# Patient Record
Sex: FEMALE | Race: WHITE | NOT HISPANIC OR LATINO | Employment: UNEMPLOYED | URBAN - METROPOLITAN AREA
[De-identification: names, ages, dates, MRNs, and addresses within clinical notes are randomized per-mention and may not be internally consistent; named-entity substitution may affect disease eponyms.]

---

## 2019-01-14 ENCOUNTER — HOSPITAL ENCOUNTER (OUTPATIENT)
Dept: OTHER | Facility: HOSPITAL | Age: 19
Discharge: HOME | End: 2019-01-14

## 2019-01-14 LAB
ALBUMIN SERPL-MCNC: 4.2 G/DL (ref 3.5–5)
ALBUMIN/GLOB SERPL: 1.1 {RATIO} (ref 1.4–2.6)
ALP SERPL-CCNC: 87 U/L (ref 50–130)
ALT SERPL-CCNC: 15 U/L (ref 10–40)
ANION GAP SERPL CALC-SCNC: 16 MMOL/L (ref 8–19)
AST SERPL-CCNC: 13 U/L (ref 15–50)
BASOPHILS # BLD AUTO: 0.07 10*3/UL (ref 0–0.2)
BASOPHILS NFR BLD AUTO: 0.85 % (ref 0–3)
BILIRUB SERPL-MCNC: 0.38 MG/DL (ref 0.2–1.3)
BUN SERPL-MCNC: 11 MG/DL (ref 5–25)
BUN/CREAT SERPL: 16 {RATIO} (ref 6–20)
CALCIUM SERPL-MCNC: 9.2 MG/DL (ref 8.7–10.4)
CHLORIDE SERPL-SCNC: 102 MMOL/L (ref 99–111)
CONV CO2: 26 MMOL/L (ref 22–32)
CONV TOTAL PROTEIN: 7.9 G/DL (ref 6.3–8.2)
CREAT UR-MCNC: 0.67 MG/DL (ref 0.5–0.9)
EOSINOPHIL # BLD AUTO: 0.3 10*3/UL (ref 0–0.7)
EOSINOPHIL # BLD AUTO: 3.81 % (ref 0–7)
ERYTHROCYTE [DISTWIDTH] IN BLOOD BY AUTOMATED COUNT: 14 % (ref 11.5–14.5)
EST. AVERAGE GLUCOSE BLD GHB EST-MCNC: 82 MG/DL
GFR SERPLBLD BASED ON 1.73 SQ M-ARVRAT: >60 ML/MIN/{1.73_M2}
GLOBULIN UR ELPH-MCNC: 3.7 G/DL (ref 2–3.5)
GLUCOSE SERPL-MCNC: 89 MG/DL (ref 65–99)
HBA1C MFR BLD: 12.3 G/DL (ref 12–16)
HBA1C MFR BLD: 4.5 % (ref 3.5–5.7)
HCT VFR BLD AUTO: 38.4 % (ref 37–47)
LYMPHOCYTES # BLD AUTO: 2.55 10*3/UL (ref 1–5)
MCH RBC QN AUTO: 24.6 PG (ref 27–31)
MCHC RBC AUTO-ENTMCNC: 31.9 G/DL (ref 33–37)
MCV RBC AUTO: 77.1 FL (ref 81–99)
MONOCYTES # BLD AUTO: 0.53 10*3/UL (ref 0.2–1.2)
MONOCYTES NFR BLD AUTO: 6.67 % (ref 3–10)
NEUTROPHILS # BLD AUTO: 4.48 10*3/UL (ref 2–8)
NEUTROPHILS NFR BLD AUTO: 56.5 % (ref 30–85)
NRBC BLD AUTO-RTO: 0 % (ref 0–0.01)
OSMOLALITY SERPL CALC.SUM OF ELEC: 289 MOSM/KG (ref 273–304)
PLATELET # BLD AUTO: 419 10*3/UL (ref 130–400)
PMV BLD AUTO: 6.9 FL (ref 7.4–10.4)
POTASSIUM SERPL-SCNC: 4.3 MMOL/L (ref 3.5–5.3)
RBC # BLD AUTO: 4.98 10*6/UL (ref 4.2–5.4)
SODIUM SERPL-SCNC: 140 MMOL/L (ref 135–147)
T4 FREE SERPL-MCNC: 1.1 NG/DL (ref 0.9–1.8)
TSH SERPL-ACNC: 2.79 M[IU]/L (ref 0.27–4.2)
VARIANT LYMPHS NFR BLD MANUAL: 32.2 % (ref 20–45)
WBC # BLD AUTO: 7.93 10*3/UL (ref 4.8–10.8)

## 2019-01-15 LAB — INSULIN SERPL-ACNC: 34.7 UIU/ML (ref 2.6–24.9)

## 2019-06-03 ENCOUNTER — HOSPITAL ENCOUNTER (OUTPATIENT)
Dept: URGENT CARE | Facility: CLINIC | Age: 19
Discharge: HOME | End: 2019-06-03
Attending: EMERGENCY MEDICINE

## 2019-06-13 ENCOUNTER — HOSPITAL ENCOUNTER (OUTPATIENT)
Dept: CARDIOLOGY | Facility: HOSPITAL | Age: 19
Discharge: HOME | End: 2019-06-13
Attending: NURSE PRACTITIONER

## 2019-10-24 ENCOUNTER — HOSPITAL ENCOUNTER (OUTPATIENT)
Dept: URGENT CARE | Facility: CLINIC | Age: 19
Discharge: HOME | End: 2019-10-24

## 2019-10-26 LAB — BACTERIA SPEC AEROBE CULT: NORMAL

## 2019-11-22 ENCOUNTER — OFFICE VISIT CONVERTED (OUTPATIENT)
Dept: FAMILY MEDICINE CLINIC | Facility: CLINIC | Age: 19
End: 2019-11-22
Attending: FAMILY MEDICINE

## 2019-12-04 ENCOUNTER — OFFICE VISIT CONVERTED (OUTPATIENT)
Dept: FAMILY MEDICINE CLINIC | Facility: CLINIC | Age: 19
End: 2019-12-04
Attending: FAMILY MEDICINE

## 2019-12-04 ENCOUNTER — HOSPITAL ENCOUNTER (OUTPATIENT)
Dept: GENERAL RADIOLOGY | Facility: HOSPITAL | Age: 19
Discharge: HOME | End: 2019-12-04
Attending: FAMILY MEDICINE

## 2020-02-10 ENCOUNTER — OFFICE VISIT CONVERTED (OUTPATIENT)
Dept: FAMILY MEDICINE CLINIC | Facility: CLINIC | Age: 20
End: 2020-02-10
Attending: FAMILY MEDICINE

## 2020-08-09 ENCOUNTER — HOSPITAL ENCOUNTER (EMERGENCY)
Facility: HOSPITAL | Age: 20
Discharge: HOME | End: 2020-08-10
Attending: EMERGENCY MEDICINE | Admitting: EMERGENCY MEDICINE

## 2020-08-09 ENCOUNTER — APPOINTMENT (OUTPATIENT)
Dept: GENERAL RADIOLOGY | Facility: HOSPITAL | Age: 20
End: 2020-08-09

## 2020-08-09 VITALS
TEMPERATURE: 96.8 F | HEIGHT: 64 IN | OXYGEN SATURATION: 98 % | BODY MASS INDEX: 45.24 KG/M2 | DIASTOLIC BLOOD PRESSURE: 86 MMHG | HEART RATE: 113 BPM | SYSTOLIC BLOOD PRESSURE: 137 MMHG | RESPIRATION RATE: 20 BRPM | WEIGHT: 265 LBS

## 2020-08-09 DIAGNOSIS — S93.402A SPRAIN OF LEFT ANKLE, UNSPECIFIED LIGAMENT, INITIAL ENCOUNTER: Primary | ICD-10-CM

## 2020-08-09 PROCEDURE — 73610 X-RAY EXAM OF ANKLE: CPT

## 2020-08-09 PROCEDURE — 99283 EMERGENCY DEPT VISIT LOW MDM: CPT

## 2020-08-10 RX ORDER — KETOROLAC TROMETHAMINE 10 MG/1
10 TABLET, FILM COATED ORAL EVERY 8 HOURS PRN
Qty: 15 TABLET | Refills: 0 | Status: SHIPPED | OUTPATIENT
Start: 2020-08-10 | End: 2021-08-05

## 2020-08-11 NOTE — ED PROVIDER NOTES
Subjective   19-year-old female in the emergency department after stepping into a hole and twisting her ankle.  Swelling and pain so came to emergency department for further evaluation.  Reports her pain is a 5/10 at this time in severity.  Has no significant past medical history.      History provided by:  Patient   used: No        Review of Systems   Constitutional: Negative for activity change, appetite change, chills, diaphoresis, fatigue and fever.   HENT: Negative for congestion, ear pain and sore throat.    Eyes: Negative for redness.   Respiratory: Negative for cough, chest tightness, shortness of breath and wheezing.    Cardiovascular: Negative for chest pain, palpitations and leg swelling.   Gastrointestinal: Negative for abdominal pain, diarrhea, nausea and vomiting.   Genitourinary: Negative for dysuria and urgency.   Musculoskeletal: Negative for arthralgias, back pain, myalgias and neck pain.   Skin: Negative for pallor, rash and wound.   Neurological: Negative for dizziness, speech difficulty, weakness and headaches.   Psychiatric/Behavioral: Negative for agitation, behavioral problems, confusion and decreased concentration.   All other systems reviewed and are negative.      No past medical history on file.    Allergies   Allergen Reactions   • Penicillins Rash       No past surgical history on file.    No family history on file.    Social History     Socioeconomic History   • Marital status: Single     Spouse name: Not on file   • Number of children: Not on file   • Years of education: Not on file   • Highest education level: Not on file           Objective   Physical Exam   Constitutional: She is oriented to person, place, and time. She appears well-developed and well-nourished.  Non-toxic appearance. No distress.   HENT:   Head: Normocephalic and atraumatic.   Right Ear: External ear normal.   Left Ear: External ear normal.   Nose: Nose normal.   Mouth/Throat: Oropharynx is  clear and moist and mucous membranes are normal. No oropharyngeal exudate. No tonsillar exudate.   Eyes: Pupils are equal, round, and reactive to light. Conjunctivae, EOM and lids are normal.   Neck: Normal range of motion and full passive range of motion without pain. Neck supple. No thyromegaly present.   Cardiovascular: Normal rate, regular rhythm, S1 normal, S2 normal, normal heart sounds, intact distal pulses and normal pulses.   Pulmonary/Chest: Effort normal and breath sounds normal. No tachypnea. No respiratory distress. She has no decreased breath sounds. She has no wheezes. She has no rales. She exhibits no tenderness.   Abdominal: Soft. Normal appearance and bowel sounds are normal. She exhibits no distension. There is no tenderness. There is no rebound and no guarding.   Musculoskeletal: Normal range of motion. She exhibits edema and tenderness. She exhibits no deformity.        Left ankle: She exhibits swelling.   Lymphadenopathy:     She has no cervical adenopathy.   Neurological: She is alert and oriented to person, place, and time. She has normal strength. No cranial nerve deficit or sensory deficit. GCS eye subscore is 4. GCS verbal subscore is 5. GCS motor subscore is 6.   Skin: Skin is warm, dry and intact. No rash noted. She is not diaphoretic. No erythema. No pallor.   Psychiatric: She has a normal mood and affect. Her speech is normal and behavior is normal. Judgment and thought content normal. Cognition and memory are normal.   Nursing note and vitals reviewed.      Procedures           ED Course  ED Course as of Aug 11 0221   Tue Aug 11, 2020   0221 IMPRESSION:  Soft tissue swelling without acute fracture or malalignment.   XR Ankle 3+ View Left [ES]      ED Course User Index  [ES] Lyndon Chavis MD                                           MDM  Number of Diagnoses or Management Options  Sprain of left ankle, unspecified ligament, initial encounter: new and requires workup      Amount and/or Complexity of Data Reviewed  Clinical lab tests: ordered and reviewed  Tests in the radiology section of CPT®: reviewed and ordered  Tests in the medicine section of CPT®: ordered and reviewed  Review and summarize past medical records: yes  Independent visualization of images, tracings, or specimens: yes    Risk of Complications, Morbidity, and/or Mortality  Presenting problems: moderate  Diagnostic procedures: moderate  Management options: moderate    Patient Progress  Patient progress: stable      Final diagnoses:   Sprain of left ankle, unspecified ligament, initial encounter            Lyndon hCavis MD  08/11/20 1474

## 2020-09-08 ENCOUNTER — APPOINTMENT (OUTPATIENT)
Dept: GENERAL RADIOLOGY | Facility: HOSPITAL | Age: 20
End: 2020-09-08

## 2020-09-08 ENCOUNTER — APPOINTMENT (OUTPATIENT)
Dept: ULTRASOUND IMAGING | Facility: HOSPITAL | Age: 20
End: 2020-09-08

## 2020-09-08 ENCOUNTER — HOSPITAL ENCOUNTER (EMERGENCY)
Facility: HOSPITAL | Age: 20
Discharge: HOME | End: 2020-09-08
Attending: EMERGENCY MEDICINE | Admitting: EMERGENCY MEDICINE

## 2020-09-08 VITALS
TEMPERATURE: 98.4 F | RESPIRATION RATE: 20 BRPM | WEIGHT: 258 LBS | HEART RATE: 92 BPM | BODY MASS INDEX: 44.05 KG/M2 | DIASTOLIC BLOOD PRESSURE: 90 MMHG | OXYGEN SATURATION: 100 % | HEIGHT: 64 IN | SYSTOLIC BLOOD PRESSURE: 129 MMHG

## 2020-09-08 DIAGNOSIS — K80.50 BILIARY COLIC: Primary | ICD-10-CM

## 2020-09-08 LAB
ALBUMIN SERPL-MCNC: 4.58 G/DL (ref 3.5–5.2)
ALBUMIN/GLOB SERPL: 1.1 G/DL
ALP SERPL-CCNC: 76 U/L (ref 39–117)
ALT SERPL W P-5'-P-CCNC: 33 U/L (ref 1–33)
ANION GAP SERPL CALCULATED.3IONS-SCNC: 12.2 MMOL/L (ref 5–15)
AST SERPL-CCNC: 19 U/L (ref 1–32)
B-HCG UR QL: NEGATIVE
BASOPHILS # BLD AUTO: 0.07 10*3/MM3 (ref 0–0.2)
BASOPHILS NFR BLD AUTO: 0.6 % (ref 0–1.5)
BILIRUB SERPL-MCNC: 0.3 MG/DL (ref 0–1.2)
BILIRUB UR QL STRIP: NEGATIVE
BUN SERPL-MCNC: 12 MG/DL (ref 6–20)
BUN/CREAT SERPL: 15.8 (ref 7–25)
CALCIUM SPEC-SCNC: 9.8 MG/DL (ref 8.6–10.5)
CHLORIDE SERPL-SCNC: 103 MMOL/L (ref 98–107)
CLARITY UR: CLEAR
CO2 SERPL-SCNC: 24.8 MMOL/L (ref 22–29)
COLOR UR: ABNORMAL
CREAT SERPL-MCNC: 0.76 MG/DL (ref 0.57–1)
CRP SERPL-MCNC: 1.44 MG/DL (ref 0–0.5)
D-LACTATE SERPL-SCNC: 0.7 MMOL/L (ref 0.5–2)
DEPRECATED RDW RBC AUTO: 43.4 FL (ref 37–54)
EOSINOPHIL # BLD AUTO: 0.16 10*3/MM3 (ref 0–0.4)
EOSINOPHIL NFR BLD AUTO: 1.3 % (ref 0.3–6.2)
ERYTHROCYTE [DISTWIDTH] IN BLOOD BY AUTOMATED COUNT: 16.2 % (ref 12.3–15.4)
GFR SERPL CREATININE-BSD FRML MDRD: 97 ML/MIN/1.73
GLOBULIN UR ELPH-MCNC: 4.1 GM/DL
GLUCOSE SERPL-MCNC: 87 MG/DL (ref 65–99)
GLUCOSE UR STRIP-MCNC: NEGATIVE MG/DL
HCT VFR BLD AUTO: 39.2 % (ref 34–46.6)
HGB BLD-MCNC: 11.7 G/DL (ref 12–15.9)
HGB UR QL STRIP.AUTO: NEGATIVE
HOLD SPECIMEN: NORMAL
HOLD SPECIMEN: NORMAL
IMM GRANULOCYTES # BLD AUTO: 0.04 10*3/MM3 (ref 0–0.05)
IMM GRANULOCYTES NFR BLD AUTO: 0.3 % (ref 0–0.5)
KETONES UR QL STRIP: NEGATIVE
LEUKOCYTE ESTERASE UR QL STRIP.AUTO: NEGATIVE
LIPASE SERPL-CCNC: 27 U/L (ref 13–60)
LYMPHOCYTES # BLD AUTO: 2.53 10*3/MM3 (ref 0.7–3.1)
LYMPHOCYTES NFR BLD AUTO: 21 % (ref 19.6–45.3)
MCH RBC QN AUTO: 22.5 PG (ref 26.6–33)
MCHC RBC AUTO-ENTMCNC: 29.8 G/DL (ref 31.5–35.7)
MCV RBC AUTO: 75.4 FL (ref 79–97)
MONOCYTES # BLD AUTO: 0.66 10*3/MM3 (ref 0.1–0.9)
MONOCYTES NFR BLD AUTO: 5.5 % (ref 5–12)
NEUTROPHILS NFR BLD AUTO: 71.3 % (ref 42.7–76)
NEUTROPHILS NFR BLD AUTO: 8.56 10*3/MM3 (ref 1.7–7)
NITRITE UR QL STRIP: NEGATIVE
NRBC BLD AUTO-RTO: 0 /100 WBC (ref 0–0.2)
PH UR STRIP.AUTO: <=5 [PH] (ref 5–8)
PLATELET # BLD AUTO: 488 10*3/MM3 (ref 140–450)
PMV BLD AUTO: 9.1 FL (ref 6–12)
POTASSIUM SERPL-SCNC: 3.8 MMOL/L (ref 3.5–5.2)
PROT SERPL-MCNC: 8.7 G/DL (ref 6–8.5)
PROT UR QL STRIP: ABNORMAL
RBC # BLD AUTO: 5.2 10*6/MM3 (ref 3.77–5.28)
SODIUM SERPL-SCNC: 140 MMOL/L (ref 136–145)
SP GR UR STRIP: >1.03 (ref 1–1.03)
UROBILINOGEN UR QL STRIP: ABNORMAL
WBC # BLD AUTO: 12.02 10*3/MM3 (ref 3.4–10.8)
WHOLE BLOOD HOLD SPECIMEN: NORMAL
WHOLE BLOOD HOLD SPECIMEN: NORMAL

## 2020-09-08 PROCEDURE — 85025 COMPLETE CBC W/AUTO DIFF WBC: CPT | Performed by: PHYSICIAN ASSISTANT

## 2020-09-08 PROCEDURE — 83690 ASSAY OF LIPASE: CPT | Performed by: PHYSICIAN ASSISTANT

## 2020-09-08 PROCEDURE — 86140 C-REACTIVE PROTEIN: CPT | Performed by: PHYSICIAN ASSISTANT

## 2020-09-08 PROCEDURE — 96374 THER/PROPH/DIAG INJ IV PUSH: CPT

## 2020-09-08 PROCEDURE — 81003 URINALYSIS AUTO W/O SCOPE: CPT | Performed by: PHYSICIAN ASSISTANT

## 2020-09-08 PROCEDURE — 71045 X-RAY EXAM CHEST 1 VIEW: CPT

## 2020-09-08 PROCEDURE — 81025 URINE PREGNANCY TEST: CPT | Performed by: PHYSICIAN ASSISTANT

## 2020-09-08 PROCEDURE — 25010000002 ONDANSETRON PER 1 MG: Performed by: PHYSICIAN ASSISTANT

## 2020-09-08 PROCEDURE — 76705 ECHO EXAM OF ABDOMEN: CPT

## 2020-09-08 PROCEDURE — 99284 EMERGENCY DEPT VISIT MOD MDM: CPT

## 2020-09-08 PROCEDURE — 83605 ASSAY OF LACTIC ACID: CPT | Performed by: PHYSICIAN ASSISTANT

## 2020-09-08 PROCEDURE — 87040 BLOOD CULTURE FOR BACTERIA: CPT | Performed by: PHYSICIAN ASSISTANT

## 2020-09-08 PROCEDURE — 80053 COMPREHEN METABOLIC PANEL: CPT | Performed by: PHYSICIAN ASSISTANT

## 2020-09-08 RX ORDER — SODIUM CHLORIDE 0.9 % (FLUSH) 0.9 %
10 SYRINGE (ML) INJECTION AS NEEDED
Status: DISCONTINUED | OUTPATIENT
Start: 2020-09-08 | End: 2020-09-08 | Stop reason: HOSPADM

## 2020-09-08 RX ORDER — ONDANSETRON 4 MG/1
4 TABLET, ORALLY DISINTEGRATING ORAL EVERY 6 HOURS PRN
Qty: 12 TABLET | Refills: 0 | Status: SHIPPED | OUTPATIENT
Start: 2020-09-08 | End: 2021-10-21 | Stop reason: SDUPTHER

## 2020-09-08 RX ORDER — ONDANSETRON 2 MG/ML
4 INJECTION INTRAMUSCULAR; INTRAVENOUS ONCE
Status: COMPLETED | OUTPATIENT
Start: 2020-09-08 | End: 2020-09-08

## 2020-09-08 RX ADMIN — ONDANSETRON 4 MG: 2 INJECTION INTRAMUSCULAR; INTRAVENOUS at 18:56

## 2020-09-08 RX ADMIN — SODIUM CHLORIDE 1000 ML: 9 INJECTION, SOLUTION INTRAVENOUS at 18:56

## 2020-09-09 NOTE — ED PROVIDER NOTES
Subjective     History provided by:  Patient   used: No    Abdominal Pain   Pain location:  RUQ  Pain quality: sharp    Pain radiates to:  Does not radiate  Pain severity:  Moderate  Onset quality:  Sudden  Duration:  1 week  Timing:  Intermittent  Progression:  Worsening  Chronicity:  New  Context: eating    Worsened by:  Eating and palpation  Associated symptoms: nausea and vomiting        Review of Systems   Constitutional: Negative.    HENT: Negative.    Eyes: Negative.    Respiratory: Negative.    Cardiovascular: Negative.    Gastrointestinal: Positive for abdominal pain, nausea and vomiting.   Endocrine: Negative.    Genitourinary: Negative.    Musculoskeletal: Negative.    Skin: Negative.    Allergic/Immunologic: Negative.    Neurological: Negative.    Hematological: Negative.    Psychiatric/Behavioral: Negative.    All other systems reviewed and are negative.      No past medical history on file.    Allergies   Allergen Reactions   • Penicillins Rash       No past surgical history on file.    No family history on file.    Social History     Socioeconomic History   • Marital status: Single     Spouse name: Not on file   • Number of children: Not on file   • Years of education: Not on file   • Highest education level: Not on file           Objective   Physical Exam   Constitutional: She is oriented to person, place, and time. She appears well-developed and well-nourished.   HENT:   Head: Normocephalic and atraumatic.   Mouth/Throat: Oropharynx is clear and moist.   Eyes: Pupils are equal, round, and reactive to light. EOM are normal.   Cardiovascular: Normal rate, regular rhythm, normal heart sounds and intact distal pulses.   Pulmonary/Chest: Effort normal and breath sounds normal.   Abdominal: Soft. Normal appearance and bowel sounds are normal. There is tenderness in the right upper quadrant.   Neurological: She is alert and oriented to person, place, and time.   Skin: Skin is warm and  dry. Capillary refill takes less than 2 seconds.   Psychiatric: She has a normal mood and affect. Her behavior is normal.   Nursing note and vitals reviewed.      Procedures           ED Course  ED Course as of Sep 08 2352   Tue Sep 08, 2020   2044 IMPRESSION:  No evidence of gallstones. The sonographer reports a positive sonographic Taylor's sign.     Signer Name: Jason Shen MD   Signed: 9/8/2020 8:15 PM   Workstation Name: [facility]    [facility] Gallbladder [ML]   2044 IMPRESSION:  No acute cardiopulmonary findings.     Signer Name: Jason Shen MD   Signed: 9/8/2020 8:25 PM   Workstation Name: [facility]    [facility]   XR Chest 1 View [ML]      ED Course User Index  [ML] Sabrina Little PA                                           MDM  Number of Diagnoses or Management Options  Biliary colic:      Amount and/or Complexity of Data Reviewed  Clinical lab tests: ordered and reviewed  Tests in the radiology section of CPT®: ordered and reviewed  Discuss the patient with other providers: yes    Risk of Complications, Morbidity, and/or Mortality  Presenting problems: low  Diagnostic procedures: low  Management options: low    Patient Progress  Patient progress: improved      Final diagnoses:   Biliary colic            Sabrina Little PA  09/08/20 2688

## 2020-09-13 LAB
BACTERIA SPEC AEROBE CULT: NORMAL
BACTERIA SPEC AEROBE CULT: NORMAL

## 2020-09-16 ENCOUNTER — OFFICE VISIT (OUTPATIENT)
Dept: SURGERY | Facility: CLINIC | Age: 20
End: 2020-09-16

## 2020-09-16 VITALS
TEMPERATURE: 97.8 F | HEIGHT: 64 IN | WEIGHT: 260 LBS | SYSTOLIC BLOOD PRESSURE: 143 MMHG | HEART RATE: 101 BPM | DIASTOLIC BLOOD PRESSURE: 96 MMHG | BODY MASS INDEX: 44.39 KG/M2

## 2020-09-16 DIAGNOSIS — K82.8 BILIARY DYSKINESIA: ICD-10-CM

## 2020-09-16 DIAGNOSIS — K81.9 CHOLECYSTITIS: Primary | ICD-10-CM

## 2020-09-16 PROCEDURE — 99205 OFFICE O/P NEW HI 60 MIN: CPT | Performed by: SURGERY

## 2020-09-16 RX ORDER — HEPARIN SODIUM 5000 [USP'U]/ML
5000 INJECTION, SOLUTION INTRAVENOUS; SUBCUTANEOUS ONCE
Status: CANCELLED | OUTPATIENT
Start: 2020-09-16

## 2020-09-16 RX ORDER — SERTRALINE HYDROCHLORIDE 100 MG/1
100 TABLET, FILM COATED ORAL DAILY
COMMUNITY
End: 2021-07-26

## 2020-09-16 RX ORDER — LEVOFLOXACIN 5 MG/ML
500 INJECTION, SOLUTION INTRAVENOUS ONCE
Status: CANCELLED | OUTPATIENT
Start: 2020-09-16 | End: 2020-09-16

## 2020-09-16 RX ORDER — PANTOPRAZOLE SODIUM 40 MG/1
40 TABLET, DELAYED RELEASE ORAL DAILY
Qty: 30 TABLET | Refills: 1 | Status: SHIPPED | OUTPATIENT
Start: 2020-09-16 | End: 2020-11-15

## 2020-09-16 NOTE — H&P (VIEW-ONLY)
Date of Service: 9/16/2020    Subjective   Jayda Sharif is a 19 y.o. female is being seen for consultation for abdominal pain today at the request of Jason Dos Santos MD    Chief complaint: Prandial abdominal pain, nausea, vomiting, diarrhea    Jayda Sharif is a 19 y.o. morbidly obese female smoker presenting to my clinic in consultation for several weeks of severe right upper quadrant abdominal pain.  This is associated with nausea plus or minus vomiting that occurs after a fatty meal.  The majority of the patient's diet consists of fast food due to her work schedule.  She has the symptoms after every meal.  There is also been associated with diarrhea.  She has not trialed many bland foods to see if they will exacerbate her pain.  She has baseline GERD symptoms but does not take any medications for it.    Past Medical History:   Diagnosis Date   • Anemia    • Diabetes mellitus (CMS/HCC)        Past Surgical History:   Procedure Laterality Date   • NO PAST SURGERIES           Family History   Problem Relation Age of Onset   • Hypertension Mother    • Diabetes Mother    • Cancer Mother    • No Known Problems Father    • Cancer Maternal Aunt    • Cancer Maternal Grandmother      Had a maternal grandmother with lung cancer.  Breast cancer in a great aunt.  No gallbladder issues run in her family that she is aware of.    Social History     Socioeconomic History   • Marital status: Single     Spouse name: Not on file   • Number of children: Not on file   • Years of education: Not on file   • Highest education level: Not on file   Tobacco Use   • Smoking status: Current Every Day Smoker     Packs/day: 0.50   • Smokeless tobacco: Never Used   Substance and Sexual Activity   • Alcohol use: Never     Frequency: Never   • Drug use: Never   • Sexual activity: Defer     She is a current every day smoker.  She has been trying to quit on her own.  She was at 1 pack/day but now is at 1/2 pack/day.  She does not drink  "alcohol or use illicit drugs.  She is from [facility]           Review of Systems        Constitutional: No fevers, chills or malaise. No unintentional weight loss   Eyes: Denies visual changes    Cardiovascular: Denies chest pain, palpitations   Respiratory: Denies cough or shortness of breath   Abdominal/Gastrointestinal: Postprandial abdominal pain, nausea, vomiting, diarrhea   Genitourinary: Denies dysuria or hematuria   Musculoskeletal: Denies any but chronic joint aches, pains or deformities              Skin: No lesions or rashes   Psychiatric: No recent mood changes   Neurologic: No paresthesias or loss of function        Objective     Physical Exam:      09/16/20  1014   Weight: 118 kg (260 lb)    Body mass index is 44.63 kg/m².  Constitution: /96   Pulse 101   Temp 97.8 °F (36.6 °C)   Ht 162.6 cm (64\")   Wt 118 kg (260 lb)   BMI 44.63 kg/m²  . No acute distress.  Morbidly obese  Head: Normocephalic, atraumatic.   Eyes: Aligned without strabismus. Conjunctiva noninjected   Ears, Nose, Mouth: normal dentition, No lesions appreciated   CV: Rhythm  and rate regular  Respiratory: Symmetric chest expansion.. No respiratory distress.   Gastrointestinal:  soft, nondistended, tender to palpation in the right upper quadrant.  Skin:  No cyanosis, clubbing or edema bilaterally    Lymphatics: No abnormal cervical or axillary adenopathy  Neurologic: No gross deficits   Psychiatric: alert and oriented x3          Results:  I have reviewed the patient's right upper quadrant ultrasound from her emergency department visit on September 8 which demonstrates no cholelithiasis, no wall thickening, no pericholecystic fluid.  The common bile duct is 4 mm.    On outside documentation, there are HIDA scan results which demonstrates a 0% ejection fraction.    I have reviewed the patient's lab work from her September 8 ER visit.  She had a mildly elevated CRP at 1.44 with a leukocytosis of 12.  She was anemic " with a hemoglobin of 11.7 of unclear etiology.  Her bilirubin was 0.3.    Assessment     Jayda Sharif is a 19 y.o. morbidly obese female smoker with chronic cholecystitis versus biliary dyskinesia.    The patient's HIDA scan and symptoms are concerning for chronic cholecystitis.  We discussed the risks and benefits of laparoscopic cholecystectomy.  Specifically this patient, she is morbidly obese making the case significantly more difficult and increases her risk of hernia development.  She voiced her understanding and elects to proceed.  All questions were answered though her ultrasound was normal, she had significant pain with injection of cholecystokinin on the HIDA scan and a 0% ejection fraction.  Is very suggestive that she has an element of chronic cholecystitis versus biliary dyskinesia.  She continues to abuse tobacco products which increases her risk of wound infections and hernia development.  We discussed smoking cessation which she will attempt to quit before surgery but she is not interested in any medications to help her do so.      Advised the patient to avoid fast food.  I made suggestions for a low-fat diet leading up to the surgery.  The patient has baseline reflux.  We discussed that weight loss would significantly improve this.  In the meantime I have prescribed her a proton pump inhibitor for symptom relief.    She will get a baseline ECG preop.  Her laboratory studies from her ER visit on September 8 will likely suffice.  Preop subcu heparin  Preop Levaquin and Flagyl due to penicillin allergy.               Saurabh Guerrero MD  [facility] General Surgery

## 2020-09-16 NOTE — H&P
Date of Service: 9/16/2020    Subjective   Jayda Sharif is a 19 y.o. female is being seen for consultation for abdominal pain today at the request of Jason Dos Santos MD    Chief complaint: Prandial abdominal pain, nausea, vomiting, diarrhea    Jayda Sharif is a 19 y.o. morbidly obese female smoker presenting to my clinic in consultation for several weeks of severe right upper quadrant abdominal pain.  This is associated with nausea plus or minus vomiting that occurs after a fatty meal.  The majority of the patient's diet consists of fast food due to her work schedule.  She has the symptoms after every meal.  There is also been associated with diarrhea.  She has not trialed many bland foods to see if they will exacerbate her pain.  She has baseline GERD symptoms but does not take any medications for it.    Past Medical History:   Diagnosis Date   • Anemia    • Diabetes mellitus (CMS/HCC)        Past Surgical History:   Procedure Laterality Date   • NO PAST SURGERIES           Family History   Problem Relation Age of Onset   • Hypertension Mother    • Diabetes Mother    • Cancer Mother    • No Known Problems Father    • Cancer Maternal Aunt    • Cancer Maternal Grandmother      Had a maternal grandmother with lung cancer.  Breast cancer in a great aunt.  No gallbladder issues run in her family that she is aware of.    Social History     Socioeconomic History   • Marital status: Single     Spouse name: Not on file   • Number of children: Not on file   • Years of education: Not on file   • Highest education level: Not on file   Tobacco Use   • Smoking status: Current Every Day Smoker     Packs/day: 0.50   • Smokeless tobacco: Never Used   Substance and Sexual Activity   • Alcohol use: Never     Frequency: Never   • Drug use: Never   • Sexual activity: Defer     She is a current every day smoker.  She has been trying to quit on her own.  She was at 1 pack/day but now is at 1/2 pack/day.  She does not drink  "alcohol or use illicit drugs.  She is from [facility]           Review of Systems        Constitutional: No fevers, chills or malaise. No unintentional weight loss   Eyes: Denies visual changes    Cardiovascular: Denies chest pain, palpitations   Respiratory: Denies cough or shortness of breath   Abdominal/Gastrointestinal: Postprandial abdominal pain, nausea, vomiting, diarrhea   Genitourinary: Denies dysuria or hematuria   Musculoskeletal: Denies any but chronic joint aches, pains or deformities              Skin: No lesions or rashes   Psychiatric: No recent mood changes   Neurologic: No paresthesias or loss of function        Objective     Physical Exam:      09/16/20  1014   Weight: 118 kg (260 lb)    Body mass index is 44.63 kg/m².  Constitution: /96   Pulse 101   Temp 97.8 °F (36.6 °C)   Ht 162.6 cm (64\")   Wt 118 kg (260 lb)   BMI 44.63 kg/m²  . No acute distress.  Morbidly obese  Head: Normocephalic, atraumatic.   Eyes: Aligned without strabismus. Conjunctiva noninjected   Ears, Nose, Mouth: normal dentition, No lesions appreciated   CV: Rhythm  and rate regular  Respiratory: Symmetric chest expansion.. No respiratory distress.   Gastrointestinal:  soft, nondistended, tender to palpation in the right upper quadrant.  Skin:  No cyanosis, clubbing or edema bilaterally    Lymphatics: No abnormal cervical or axillary adenopathy  Neurologic: No gross deficits   Psychiatric: alert and oriented x3          Results:  I have reviewed the patient's right upper quadrant ultrasound from her emergency department visit on September 8 which demonstrates no cholelithiasis, no wall thickening, no pericholecystic fluid.  The common bile duct is 4 mm.    On outside documentation, there are HIDA scan results which demonstrates a 0% ejection fraction.    I have reviewed the patient's lab work from her September 8 ER visit.  She had a mildly elevated CRP at 1.44 with a leukocytosis of 12.  She was anemic " with a hemoglobin of 11.7 of unclear etiology.  Her bilirubin was 0.3.    Assessment     Jayda Sharif is a 19 y.o. morbidly obese female smoker with chronic cholecystitis versus biliary dyskinesia.    The patient's HIDA scan and symptoms are concerning for chronic cholecystitis.  We discussed the risks and benefits of laparoscopic cholecystectomy.  Specifically this patient, she is morbidly obese making the case significantly more difficult and increases her risk of hernia development.  She voiced her understanding and elects to proceed.  All questions were answered though her ultrasound was normal, she had significant pain with injection of cholecystokinin on the HIDA scan and a 0% ejection fraction.  Is very suggestive that she has an element of chronic cholecystitis versus biliary dyskinesia.  She continues to abuse tobacco products which increases her risk of wound infections and hernia development.  We discussed smoking cessation which she will attempt to quit before surgery but she is not interested in any medications to help her do so.      Advised the patient to avoid fast food.  I made suggestions for a low-fat diet leading up to the surgery.  The patient has baseline reflux.  We discussed that weight loss would significantly improve this.  In the meantime I have prescribed her a proton pump inhibitor for symptom relief.    She will get a baseline ECG preop.  Her laboratory studies from her ER visit on September 8 will likely suffice.  Preop subcu heparin  Preop Levaquin and Flagyl due to penicillin allergy.               Saurabh Guerrero MD  [facility] General Surgery

## 2020-09-17 ENCOUNTER — TELEPHONE (OUTPATIENT)
Dept: SURGERY | Facility: CLINIC | Age: 20
End: 2020-09-17

## 2020-09-17 NOTE — TELEPHONE ENCOUNTER
I INFORMED PATIENT OF PAT FOR SEPT 18 @ 2:15 AND REMINDED HER TO GET COVID TEST. SHE SAID THEY CALLED HER TO BE THERE FOR COVID TEST AT 2:30. SHE SAID SHE WILL MAKE BOTH APPTS.

## 2020-09-18 ENCOUNTER — LAB (OUTPATIENT)
Dept: LAB | Facility: HOSPITAL | Age: 20
End: 2020-09-18

## 2020-09-18 ENCOUNTER — APPOINTMENT (OUTPATIENT)
Dept: PREADMISSION TESTING | Facility: HOSPITAL | Age: 20
End: 2020-09-18

## 2020-09-18 ENCOUNTER — TELEPHONE (OUTPATIENT)
Dept: SURGERY | Facility: CLINIC | Age: 20
End: 2020-09-18

## 2020-09-18 VITALS — HEIGHT: 64 IN | BODY MASS INDEX: 44.39 KG/M2 | WEIGHT: 260 LBS

## 2020-09-18 DIAGNOSIS — K82.8 BILIARY DYSKINESIA: ICD-10-CM

## 2020-09-18 DIAGNOSIS — Z01.818 PRE-OPERATIVE CLEARANCE: Primary | ICD-10-CM

## 2020-09-18 DIAGNOSIS — Z01.818 PRE-OPERATIVE CLEARANCE: ICD-10-CM

## 2020-09-18 LAB
ABO GROUP BLD: NORMAL
BASOPHILS # BLD AUTO: 0.06 10*3/MM3 (ref 0–0.2)
BASOPHILS NFR BLD AUTO: 0.7 % (ref 0–1.5)
BLD GP AB SCN SERPL QL: NEGATIVE
DEPRECATED RDW RBC AUTO: 43.5 FL (ref 37–54)
EOSINOPHIL # BLD AUTO: 0.17 10*3/MM3 (ref 0–0.4)
EOSINOPHIL NFR BLD AUTO: 1.9 % (ref 0.3–6.2)
ERYTHROCYTE [DISTWIDTH] IN BLOOD BY AUTOMATED COUNT: 16 % (ref 12.3–15.4)
HCT VFR BLD AUTO: 36.9 % (ref 34–46.6)
HGB BLD-MCNC: 11 G/DL (ref 12–15.9)
IMM GRANULOCYTES # BLD AUTO: 0.03 10*3/MM3 (ref 0–0.05)
IMM GRANULOCYTES NFR BLD AUTO: 0.3 % (ref 0–0.5)
LYMPHOCYTES # BLD AUTO: 1.89 10*3/MM3 (ref 0.7–3.1)
LYMPHOCYTES NFR BLD AUTO: 20.9 % (ref 19.6–45.3)
MCH RBC QN AUTO: 22.5 PG (ref 26.6–33)
MCHC RBC AUTO-ENTMCNC: 29.8 G/DL (ref 31.5–35.7)
MCV RBC AUTO: 75.5 FL (ref 79–97)
MONOCYTES # BLD AUTO: 0.53 10*3/MM3 (ref 0.1–0.9)
MONOCYTES NFR BLD AUTO: 5.9 % (ref 5–12)
NEUTROPHILS NFR BLD AUTO: 6.37 10*3/MM3 (ref 1.7–7)
NEUTROPHILS NFR BLD AUTO: 70.3 % (ref 42.7–76)
NRBC BLD AUTO-RTO: 0 /100 WBC (ref 0–0.2)
PLATELET # BLD AUTO: 406 10*3/MM3 (ref 140–450)
PMV BLD AUTO: 9.7 FL (ref 6–12)
RBC # BLD AUTO: 4.89 10*6/MM3 (ref 3.77–5.28)
RH BLD: NEGATIVE
T&S EXPIRATION DATE: NORMAL
WBC # BLD AUTO: 9.05 10*3/MM3 (ref 3.4–10.8)

## 2020-09-18 PROCEDURE — 36415 COLL VENOUS BLD VENIPUNCTURE: CPT

## 2020-09-18 PROCEDURE — C9803 HOPD COVID-19 SPEC COLLECT: HCPCS

## 2020-09-18 PROCEDURE — 85025 COMPLETE CBC W/AUTO DIFF WBC: CPT | Performed by: SURGERY

## 2020-09-18 PROCEDURE — U0004 COV-19 TEST NON-CDC HGH THRU: HCPCS

## 2020-09-18 PROCEDURE — 86850 RBC ANTIBODY SCREEN: CPT | Performed by: SURGERY

## 2020-09-18 PROCEDURE — 86901 BLOOD TYPING SEROLOGIC RH(D): CPT | Performed by: SURGERY

## 2020-09-18 PROCEDURE — 86900 BLOOD TYPING SEROLOGIC ABO: CPT | Performed by: SURGERY

## 2020-09-18 PROCEDURE — 86900 BLOOD TYPING SEROLOGIC ABO: CPT

## 2020-09-18 PROCEDURE — 86901 BLOOD TYPING SEROLOGIC RH(D): CPT

## 2020-09-18 NOTE — DISCHARGE INSTRUCTIONS
TAKE the following medications the morning of surgery:  9/21/2020  Arrival time per MD  All heart or blood pressure medications    HOLD all diabetic medications the morning of surgery as ordered by physician.    Please discontinue all blood thinners and anticoagulants (except aspirin) prior to surgery as per your surgeon and cardiologist instructions.  Aspirin may be continued up to the day prior to surgery.     CHLORHEXIDINE CLOTHS GIVEN WITH INSTRUCTIONS AND FORM TO RETURN TO HOSPITAL, IF APPLICABLE.    General Instructions:  9/20/2020  · Do not eat or drink after midnight: includes water, mints, or gum. You may brush your teeth.  Dental appliances that are removable must be taken out day of surgery.  · Do not smoke, chew tobacco, or drink alcohol.  · Bring medications in original bottles, any inhalers and if applicable your C-PAP/BI-PAP machine.  · Bring any papers given to you in the doctor's office.  · Wear clean comfortable clothes and socks.  · Do not wear contact lenses or make-up. Bring a case for your glasses if applicable.  · Bring crutches or walker if applicable.  · Leave all other valuables and jewelry at home.    If you were given a blood bank ID arm band remember to bring it with you the day of surgery.    Preventing a Surgical Site Infection:  Shower the night before surgery (unless instructed other wise) using a fresh bar of anti-bacterial soap (such as Dial) and clean washcloth. Dry with a clean towel and dress in clean clothing.  For 2 to 3 days before surgery, avoid shaving with a razor near where you will have surgery because the razor can irritate skin and make it easier to develop an infection. Ask your surgeon if you will be receiving antibiotics prior to surgery.  Make sure you, your family, and all healthcare providers clear their hands with soap and water or an alcohol-based hand  before caring for you or your wound.  If at all possible, quit smoking as many days before surgery  as you can.    Day of surgery:  Upon arrival, a Pre-op nurse and Anesthesiologist will review your health history, obtain vital signs, and answer questions you may have. The only belongings needed at this time will be your home medications and if applicable your C-PAP/BI-PAP machine. If you are staying overnight your family can leave the rest of your belongings in the car and bring them to your room later. A Pre-op nurse will start an IV and you may receive medication in preparation for surgery, including something to help you relax. Your family will be able to see you in the Pre-op area. While you are in surgery your family should notify the waiting room  if they leave the waiting room area and provide a contact phone number.    Please be aware that surgery does come with discomfort. We want to make every effort to control your discomfort so please discuss any uncontrolled symptoms with your nurse. Your doctor will most likely have prescribed pain medications.    If you are going home after surgery you will receive individualized written care instructions before being discharged. A responsible adult must drive you to and from the hospital on the day of surgery and stay with you for 24 hours.    If you are staying overnight following surgery, you will be transported to your hospital room following the recovery period.  [facility] has all private rooms.    If you have any questions please call Pre-Admission Testing at [phone].  Deductibles and co-payments are collected on the day of service. Please be prepared to pay the required co-pay, deductible or deposit on the day of service as defined by your plan.    A RESPONSIBLE PERSON MUST REMAIN IN THE WAITING ROOM DURING YOUR PROCEDURE AND A RESPONSIBLE  MUST BE AVAILABLE UPON YOUR DISCHARGE.

## 2020-09-18 NOTE — TELEPHONE ENCOUNTER
Patient returned call. I confirmed her procedure time Monday of 6:30. She said she will do her PAT today at 2:15 and her COVID at 2:30.

## 2020-09-19 LAB — SARS-COV-2 RNA NOSE QL NAA+PROBE: NOT DETECTED

## 2020-09-21 ENCOUNTER — ANESTHESIA EVENT (OUTPATIENT)
Dept: PERIOP | Facility: HOSPITAL | Age: 20
End: 2020-09-21

## 2020-09-21 ENCOUNTER — HOSPITAL ENCOUNTER (OUTPATIENT)
Facility: HOSPITAL | Age: 20
Setting detail: HOSPITAL OUTPATIENT SURGERY
Discharge: HOME | End: 2020-09-21
Attending: SURGERY | Admitting: SURGERY

## 2020-09-21 ENCOUNTER — ANESTHESIA (OUTPATIENT)
Dept: PERIOP | Facility: HOSPITAL | Age: 20
End: 2020-09-21

## 2020-09-21 ENCOUNTER — APPOINTMENT (OUTPATIENT)
Dept: GENERAL RADIOLOGY | Facility: HOSPITAL | Age: 20
End: 2020-09-21

## 2020-09-21 VITALS
OXYGEN SATURATION: 93 % | BODY MASS INDEX: 43.77 KG/M2 | DIASTOLIC BLOOD PRESSURE: 80 MMHG | TEMPERATURE: 98.5 F | HEART RATE: 92 BPM | SYSTOLIC BLOOD PRESSURE: 135 MMHG | WEIGHT: 256.38 LBS | RESPIRATION RATE: 16 BRPM | HEIGHT: 64 IN

## 2020-09-21 DIAGNOSIS — K82.8 BILIARY DYSKINESIA: ICD-10-CM

## 2020-09-21 DIAGNOSIS — K81.9 CHOLECYSTITIS: ICD-10-CM

## 2020-09-21 LAB — GLUCOSE BLDC GLUCOMTR-MCNC: 96 MG/DL (ref 70–130)

## 2020-09-21 PROCEDURE — 25010000002 MIDAZOLAM PER 1 MG: Performed by: NURSE ANESTHETIST, CERTIFIED REGISTERED

## 2020-09-21 PROCEDURE — 25010000002 METHYLPREDNISOLONE PER 125 MG: Performed by: NURSE ANESTHETIST, CERTIFIED REGISTERED

## 2020-09-21 PROCEDURE — 25010000002 FENTANYL CITRATE (PF) 100 MCG/2ML SOLUTION: Performed by: NURSE ANESTHETIST, CERTIFIED REGISTERED

## 2020-09-21 PROCEDURE — 25010000002 ONDANSETRON PER 1 MG: Performed by: NURSE ANESTHETIST, CERTIFIED REGISTERED

## 2020-09-21 PROCEDURE — 25010000002 NEOSTIGMINE 10 MG/10ML SOLUTION: Performed by: NURSE ANESTHETIST, CERTIFIED REGISTERED

## 2020-09-21 PROCEDURE — 25010000002 HEPARIN (PORCINE) PER 1000 UNITS: Performed by: SURGERY

## 2020-09-21 PROCEDURE — 25010000002 HYDROMORPHONE 1 MG/ML SOLUTION: Performed by: NURSE ANESTHETIST, CERTIFIED REGISTERED

## 2020-09-21 PROCEDURE — 25010000002 LEVOFLOXACIN PER 250 MG: Performed by: SURGERY

## 2020-09-21 PROCEDURE — 25010000002 PROPOFOL 10 MG/ML EMULSION: Performed by: NURSE ANESTHETIST, CERTIFIED REGISTERED

## 2020-09-21 PROCEDURE — 25010000002 KETOROLAC TROMETHAMINE PER 15 MG: Performed by: NURSE ANESTHETIST, CERTIFIED REGISTERED

## 2020-09-21 PROCEDURE — 47562 LAPAROSCOPIC CHOLECYSTECTOMY: CPT | Performed by: SURGERY

## 2020-09-21 PROCEDURE — 25010000002 DEXAMETHASONE PER 1 MG: Performed by: NURSE ANESTHETIST, CERTIFIED REGISTERED

## 2020-09-21 PROCEDURE — 82962 GLUCOSE BLOOD TEST: CPT

## 2020-09-21 DEVICE — LIGAMAX 5 MM ENDOSCOPIC MULTIPLE CLIP APPLIER
Type: IMPLANTABLE DEVICE | Site: BILE DUCT | Status: FUNCTIONAL
Brand: LIGAMAX

## 2020-09-21 RX ORDER — IPRATROPIUM BROMIDE AND ALBUTEROL SULFATE 2.5; .5 MG/3ML; MG/3ML
3 SOLUTION RESPIRATORY (INHALATION) ONCE AS NEEDED
Status: DISCONTINUED | OUTPATIENT
Start: 2020-09-21 | End: 2020-09-21 | Stop reason: HOSPADM

## 2020-09-21 RX ORDER — LIDOCAINE HYDROCHLORIDE 20 MG/ML
INJECTION, SOLUTION INFILTRATION; PERINEURAL AS NEEDED
Status: DISCONTINUED | OUTPATIENT
Start: 2020-09-21 | End: 2020-09-21 | Stop reason: SURG

## 2020-09-21 RX ORDER — LEVOFLOXACIN 5 MG/ML
500 INJECTION, SOLUTION INTRAVENOUS ONCE
Status: COMPLETED | OUTPATIENT
Start: 2020-09-21 | End: 2020-09-21

## 2020-09-21 RX ORDER — MAGNESIUM HYDROXIDE 1200 MG/15ML
LIQUID ORAL AS NEEDED
Status: DISCONTINUED | OUTPATIENT
Start: 2020-09-21 | End: 2020-09-21 | Stop reason: HOSPADM

## 2020-09-21 RX ORDER — SODIUM CHLORIDE 0.9 % (FLUSH) 0.9 %
10 SYRINGE (ML) INJECTION EVERY 12 HOURS SCHEDULED
Status: DISCONTINUED | OUTPATIENT
Start: 2020-09-21 | End: 2020-09-21 | Stop reason: HOSPADM

## 2020-09-21 RX ORDER — ONDANSETRON 2 MG/ML
INJECTION INTRAMUSCULAR; INTRAVENOUS AS NEEDED
Status: DISCONTINUED | OUTPATIENT
Start: 2020-09-21 | End: 2020-09-21 | Stop reason: SURG

## 2020-09-21 RX ORDER — TRAMADOL HYDROCHLORIDE 50 MG/1
50 TABLET ORAL EVERY 6 HOURS PRN
Qty: 15 TABLET | Refills: 0 | Status: SHIPPED | OUTPATIENT
Start: 2020-09-21 | End: 2021-07-26

## 2020-09-21 RX ORDER — ROCURONIUM BROMIDE 10 MG/ML
INJECTION, SOLUTION INTRAVENOUS AS NEEDED
Status: DISCONTINUED | OUTPATIENT
Start: 2020-09-21 | End: 2020-09-21 | Stop reason: SURG

## 2020-09-21 RX ORDER — SODIUM CHLORIDE, SODIUM LACTATE, POTASSIUM CHLORIDE, CALCIUM CHLORIDE 600; 310; 30; 20 MG/100ML; MG/100ML; MG/100ML; MG/100ML
125 INJECTION, SOLUTION INTRAVENOUS CONTINUOUS
Status: DISCONTINUED | OUTPATIENT
Start: 2020-09-21 | End: 2020-09-21 | Stop reason: HOSPADM

## 2020-09-21 RX ORDER — NEOSTIGMINE METHYLSULFATE 1 MG/ML
INJECTION, SOLUTION INTRAVENOUS AS NEEDED
Status: DISCONTINUED | OUTPATIENT
Start: 2020-09-21 | End: 2020-09-21 | Stop reason: SURG

## 2020-09-21 RX ORDER — GLYCOPYRROLATE 0.2 MG/ML
INJECTION INTRAMUSCULAR; INTRAVENOUS AS NEEDED
Status: DISCONTINUED | OUTPATIENT
Start: 2020-09-21 | End: 2020-09-21 | Stop reason: SURG

## 2020-09-21 RX ORDER — SODIUM CHLORIDE 9 MG/ML
INJECTION, SOLUTION INTRAVENOUS AS NEEDED
Status: DISCONTINUED | OUTPATIENT
Start: 2020-09-21 | End: 2020-09-21 | Stop reason: HOSPADM

## 2020-09-21 RX ORDER — MIDAZOLAM HYDROCHLORIDE 1 MG/ML
1 INJECTION INTRAMUSCULAR; INTRAVENOUS
Status: DISCONTINUED | OUTPATIENT
Start: 2020-09-21 | End: 2020-09-21 | Stop reason: HOSPADM

## 2020-09-21 RX ORDER — HEPARIN SODIUM 5000 [USP'U]/ML
5000 INJECTION, SOLUTION INTRAVENOUS; SUBCUTANEOUS ONCE
Status: COMPLETED | OUTPATIENT
Start: 2020-09-21 | End: 2020-09-21

## 2020-09-21 RX ORDER — ACETAMINOPHEN 500 MG
1000 TABLET ORAL EVERY 6 HOURS
Qty: 40 TABLET | Refills: 0 | Status: SHIPPED | OUTPATIENT
Start: 2020-09-21 | End: 2020-09-26

## 2020-09-21 RX ORDER — MEPERIDINE HYDROCHLORIDE 25 MG/ML
12.5 INJECTION INTRAMUSCULAR; INTRAVENOUS; SUBCUTANEOUS
Status: DISCONTINUED | OUTPATIENT
Start: 2020-09-21 | End: 2020-09-21 | Stop reason: HOSPADM

## 2020-09-21 RX ORDER — MIDAZOLAM HYDROCHLORIDE 1 MG/ML
INJECTION INTRAMUSCULAR; INTRAVENOUS AS NEEDED
Status: DISCONTINUED | OUTPATIENT
Start: 2020-09-21 | End: 2020-09-21 | Stop reason: SURG

## 2020-09-21 RX ORDER — PROPOFOL 10 MG/ML
VIAL (ML) INTRAVENOUS AS NEEDED
Status: DISCONTINUED | OUTPATIENT
Start: 2020-09-21 | End: 2020-09-21 | Stop reason: SURG

## 2020-09-21 RX ORDER — METHYLPREDNISOLONE SODIUM SUCCINATE 125 MG/2ML
INJECTION, POWDER, LYOPHILIZED, FOR SOLUTION INTRAMUSCULAR; INTRAVENOUS AS NEEDED
Status: DISCONTINUED | OUTPATIENT
Start: 2020-09-21 | End: 2020-09-21 | Stop reason: SURG

## 2020-09-21 RX ORDER — FAMOTIDINE 10 MG/ML
INJECTION, SOLUTION INTRAVENOUS AS NEEDED
Status: DISCONTINUED | OUTPATIENT
Start: 2020-09-21 | End: 2020-09-21 | Stop reason: SURG

## 2020-09-21 RX ORDER — BUPIVACAINE HYDROCHLORIDE AND EPINEPHRINE 2.5; 5 MG/ML; UG/ML
INJECTION, SOLUTION EPIDURAL; INFILTRATION; INTRACAUDAL; PERINEURAL AS NEEDED
Status: DISCONTINUED | OUTPATIENT
Start: 2020-09-21 | End: 2020-09-21 | Stop reason: HOSPADM

## 2020-09-21 RX ORDER — SODIUM CHLORIDE 0.9 % (FLUSH) 0.9 %
10 SYRINGE (ML) INJECTION AS NEEDED
Status: DISCONTINUED | OUTPATIENT
Start: 2020-09-21 | End: 2020-09-21 | Stop reason: HOSPADM

## 2020-09-21 RX ORDER — KETOROLAC TROMETHAMINE 30 MG/ML
INJECTION, SOLUTION INTRAMUSCULAR; INTRAVENOUS AS NEEDED
Status: DISCONTINUED | OUTPATIENT
Start: 2020-09-21 | End: 2020-09-21 | Stop reason: SURG

## 2020-09-21 RX ORDER — FENTANYL CITRATE 50 UG/ML
50 INJECTION, SOLUTION INTRAMUSCULAR; INTRAVENOUS
Status: DISCONTINUED | OUTPATIENT
Start: 2020-09-21 | End: 2020-09-21 | Stop reason: HOSPADM

## 2020-09-21 RX ORDER — ONDANSETRON 2 MG/ML
4 INJECTION INTRAMUSCULAR; INTRAVENOUS AS NEEDED
Status: DISCONTINUED | OUTPATIENT
Start: 2020-09-21 | End: 2020-09-21 | Stop reason: HOSPADM

## 2020-09-21 RX ORDER — DEXAMETHASONE SODIUM PHOSPHATE 4 MG/ML
INJECTION, SOLUTION INTRA-ARTICULAR; INTRALESIONAL; INTRAMUSCULAR; INTRAVENOUS; SOFT TISSUE AS NEEDED
Status: DISCONTINUED | OUTPATIENT
Start: 2020-09-21 | End: 2020-09-21 | Stop reason: SURG

## 2020-09-21 RX ORDER — OXYCODONE HYDROCHLORIDE AND ACETAMINOPHEN 5; 325 MG/1; MG/1
1 TABLET ORAL ONCE AS NEEDED
Status: DISCONTINUED | OUTPATIENT
Start: 2020-09-21 | End: 2020-09-21 | Stop reason: HOSPADM

## 2020-09-21 RX ORDER — FENTANYL CITRATE 50 UG/ML
INJECTION, SOLUTION INTRAMUSCULAR; INTRAVENOUS AS NEEDED
Status: DISCONTINUED | OUTPATIENT
Start: 2020-09-21 | End: 2020-09-21 | Stop reason: SURG

## 2020-09-21 RX ADMIN — FAMOTIDINE 20 MG: 10 INJECTION INTRAVENOUS at 07:25

## 2020-09-21 RX ADMIN — ROCURONIUM BROMIDE 40 MG: 10 SOLUTION INTRAVENOUS at 07:33

## 2020-09-21 RX ADMIN — PROPOFOL 200 MG: 10 INJECTION, EMULSION INTRAVENOUS at 07:33

## 2020-09-21 RX ADMIN — NEOSTIGMINE METHYLSULFATE 4 MG: 1 INJECTION, SOLUTION INTRAVENOUS at 08:40

## 2020-09-21 RX ADMIN — LEVOFLOXACIN 500 MG: 5 INJECTION, SOLUTION INTRAVENOUS at 07:25

## 2020-09-21 RX ADMIN — FENTANYL CITRATE 50 MCG: 50 INJECTION INTRAMUSCULAR; INTRAVENOUS at 09:09

## 2020-09-21 RX ADMIN — HEPARIN SODIUM 5000 UNITS: 5000 INJECTION INTRAVENOUS; SUBCUTANEOUS at 07:16

## 2020-09-21 RX ADMIN — METRONIDAZOLE 500 MG: 500 INJECTION, SOLUTION INTRAVENOUS at 07:35

## 2020-09-21 RX ADMIN — LIDOCAINE HYDROCHLORIDE 100 MG: 20 INJECTION, SOLUTION INFILTRATION; PERINEURAL at 07:33

## 2020-09-21 RX ADMIN — ONDANSETRON 4 MG: 2 INJECTION INTRAMUSCULAR; INTRAVENOUS at 07:25

## 2020-09-21 RX ADMIN — DEXAMETHASONE SODIUM PHOSPHATE 8 MG: 4 INJECTION, SOLUTION INTRAMUSCULAR; INTRAVENOUS at 07:34

## 2020-09-21 RX ADMIN — FENTANYL CITRATE 100 MCG: 50 INJECTION INTRAMUSCULAR; INTRAVENOUS at 07:25

## 2020-09-21 RX ADMIN — SODIUM CHLORIDE, POTASSIUM CHLORIDE, SODIUM LACTATE AND CALCIUM CHLORIDE: 600; 310; 30; 20 INJECTION, SOLUTION INTRAVENOUS at 08:18

## 2020-09-21 RX ADMIN — FENTANYL CITRATE 50 MCG: 50 INJECTION INTRAMUSCULAR; INTRAVENOUS at 08:15

## 2020-09-21 RX ADMIN — KETOROLAC TROMETHAMINE 30 MG: 30 INJECTION, SOLUTION INTRAMUSCULAR; INTRAVENOUS at 07:35

## 2020-09-21 RX ADMIN — FENTANYL CITRATE 50 MCG: 50 INJECTION INTRAMUSCULAR; INTRAVENOUS at 07:46

## 2020-09-21 RX ADMIN — ONDANSETRON 4 MG: 2 INJECTION INTRAMUSCULAR; INTRAVENOUS at 09:16

## 2020-09-21 RX ADMIN — SODIUM CHLORIDE, POTASSIUM CHLORIDE, SODIUM LACTATE AND CALCIUM CHLORIDE 125 ML/HR: 600; 310; 30; 20 INJECTION, SOLUTION INTRAVENOUS at 07:16

## 2020-09-21 RX ADMIN — FENTANYL CITRATE 50 MCG: 50 INJECTION INTRAMUSCULAR; INTRAVENOUS at 09:15

## 2020-09-21 RX ADMIN — METHYLPREDNISOLONE SODIUM SUCCINATE 125 MG: 125 INJECTION, POWDER, FOR SOLUTION INTRAMUSCULAR; INTRAVENOUS at 07:58

## 2020-09-21 RX ADMIN — MIDAZOLAM HYDROCHLORIDE 2 MG: 1 INJECTION, SOLUTION INTRAMUSCULAR; INTRAVENOUS at 07:25

## 2020-09-21 RX ADMIN — HYDROMORPHONE HYDROCHLORIDE 1 MG: 1 INJECTION, SOLUTION INTRAMUSCULAR; INTRAVENOUS; SUBCUTANEOUS at 09:09

## 2020-09-21 RX ADMIN — GLYCOPYRROLATE 0.8 MG: 0.2 INJECTION, SOLUTION INTRAMUSCULAR; INTRAVENOUS at 08:40

## 2020-09-21 NOTE — ANESTHESIA PREPROCEDURE EVALUATION
Anesthesia Evaluation     NPO Solid Status: > 8 hours  NPO Liquid Status: > 8 hours           Airway   Mallampati: II  TM distance: >3 FB  Neck ROM: full  No difficulty expected  Dental - normal exam     Pulmonary - normal exam   (+) asthma,sleep apnea,   Cardiovascular - normal exam        Neuro/Psych  GI/Hepatic/Renal/Endo    (+) obesity,  GERD,  diabetes mellitus,     Musculoskeletal     Abdominal  - normal exam    Bowel sounds: normal.   Substance History      OB/GYN          Other                        Anesthesia Plan    ASA 3     general     intravenous induction     Anesthetic plan, all risks, benefits, and alternatives have been provided, discussed and informed consent has been obtained with: patient.

## 2020-09-21 NOTE — ANESTHESIA POSTPROCEDURE EVALUATION
Patient: Jayda Sharif    Procedure Summary     Date: 09/21/20 Room / Location: [facility] OR  /  [facility] OR    Anesthesia Start: 0725 Anesthesia Stop: 0855    Procedure: CHOLECYSTECTOMY LAPAROSCOPIC (N/A Abdomen) Diagnosis:       Cholecystitis      Biliary dyskinesia      (Cholecystitis [K81.9])      (Biliary dyskinesia [K82.8])    Surgeon: Saurabh Guerrero MD Provider: Daryl Brooke MD    Anesthesia Type: general with block ASA Status: 3          Anesthesia Type: general with block    Vitals  Vitals Value Taken Time   /85 09/21/20 0926   Temp 98.6 °F (37 °C) 09/21/20 0856   Pulse 91 09/21/20 0926   Resp 16 09/21/20 0926   SpO2 94 % 09/21/20 0926           Post Anesthesia Care and Evaluation    Patient location during evaluation: PHASE II  Patient participation: complete - patient participated  Level of consciousness: awake and alert  Pain score: 1  Pain management: adequate  Airway patency: patent  Anesthetic complications: No anesthetic complications  PONV Status: controlled  Cardiovascular status: acceptable  Respiratory status: acceptable  Hydration status: acceptable

## 2020-09-21 NOTE — ANESTHESIA PROCEDURE NOTES
Airway  Urgency: elective    Date/Time: 9/21/2020 7:34 AM  Airway not difficult    General Information and Staff    Patient location during procedure: OR  CRNA: Noemi Rosales CRNA    Indications and Patient Condition  Indications for airway management: airway protection    Preoxygenated: yes  MILS maintained throughout  Mask difficulty assessment: 1 - vent by mask    Final Airway Details  Final airway type: endotracheal airway      Successful airway: ETT  Cuffed: yes   Successful intubation technique: direct laryngoscopy  Facilitating devices/methods: intubating stylet  Endotracheal tube insertion site: oral  Blade: Tiffanie  Blade size: 3  ETT size (mm): 7.0  Cormack-Lehane Classification: grade IIa - partial view of glottis  Placement verified by: chest auscultation and capnometry   Measured from: lips  ETT/EBT  to lips (cm): 21  Number of attempts at approach: 2  Assessment: lips, teeth, and gum same as pre-op and atraumatic intubation    Additional Comments  Upon induction patient began coughing/gagging and had clear fluid coming out of nose and mouth. DL x1 with Grade 2 view, due to coughing, unable to place ETT. DL x1 again, same Grade 2 view with successful intubation of trachea. Positive chest rise and ETCO2. Tube suctioned with minimal serous fluid suctioned out. O2 saturation maintaining above 95% on 60% FIO2.

## 2020-09-21 NOTE — INTERVAL H&P NOTE
Patient evaluated in preop holding. No interval changes to HPI. All questions answered.   To OR for laparoscopic cholecystectomy

## 2020-09-21 NOTE — OP NOTE
Operative Report    Patient Name:  Jayda Sharif  YOB: 2000  [id number]  9/21/2020      PREOPERATIVE DIAGNOSIS: Biliary dyskinesia      POSTOPERATIVE DIAGNOSIS: Same        PROCEDURE PERFORMED:     Laparoscopic cholecystectomy       SURGEON: Saurabh Guerrero MD       SPECIMENS: Gallbladder and contents        ANESTHESIA: General.        FINDINGS:     1. Gallbladder in standard positioning .  Normal on gross inspection         INDICATIONS:      The patient is a 19 y.o. morbidly obese female with a history of right upper quadrant postprandial abdominal pain worse with fatty foods, concerning for chronic  cholecystitis. Pre-operative imaging including U/S and HIDA scan.  HIDA demonstrated 0% ejection fraction and she had severe pain with cholecystokinin.  The risks and benefits of Laparoscopic cholecystectomy with cholangiography were discussed with the patient and their family and they agree to proceed.        DESCRIPTION OF PROCEDURE:     After obtaining informed consent, the patient was taken to the operating room and placed in the supine position. After appropriate DVT and antibiotic prophylaxis, general anesthesia was induced. The abdomen was prepped and draped in standard sterile fashion.  A proper timeout was performed  After administration of local anesthetic, a stab incision was made at Mccarthy's point in the left upper quadrant. Veress needle was inserted into the abdominal cavity. Saline drop test was reassuring. The abdomen was insufflated to 15 mmHg. The abdomen was then entered with a 11 mm Optiview trocar just superior to the umbilicus after local anesthetic was administered and transverse incision created with 11 blade scalpel.  We entered the abdomen at the caudal aspect of the falciform ligament, through some fatty tissue.     The gallbladder was grasped and elevated cephalad.  The gallbladder appeared grossly normal.  Using meticulous blunt and cautery dissection , the cystic duct and  "cystic artery were bluntly dissected free of other structures and clearly identified using the \"Critical View\" technique with only two structures entering the gallbladder, hepatocystic triangle cleared of fat and fibrous tissue, lower third of posterior gallbladder wall dissected off of the cystic plate.  During the initial dissection the hepatocystic triangle there was a very small inadvertent injury to the liver capsule for which hemostasis was achieved with electrocautery.  The cystic artery was then clipped twice proximally and once distally, and divided between the clips.   The cystic duct was then clipped twice distally and once at the junction of the duct and infundibulum, followed by transection with laparoscopic scissors.      The gallbladder was then dissected free of the gallbladder fossa using a combination of electrocautery and blunt dissection . The gallbladder was then placed in an Endo Catch bag, and removed from the inferiormost trocar site.  A Wiliam Lan device was used to close the umbilical port site with a single 0 Vicryl stitch.  The actual fascial defect was difficult to appreciate due to the surrounding fatty tissue from the caudal aspect of the falciform.  A single 0 Vicryl was used because the fatty tissue would likely be protective against hernia development.     The right upper quadrant was then inspected. The cystic duct and cystic artery stumps were intact without bleeding or biliary leak.  5 mm trochars were removed under direct laparoscopic visualization.  The wounds were closed in using absorbable subcuticular suture. The incisions were dressed in standard sterile fashion and covered with dry dressings. The patient recovered from anesthesia, was extubated in the operating room, and transferred to the PACU in stable condition.  All sponge and needle counts were correct times two at the completion of the procedure. There were no immediate complications.     Estimated blood loss: " 10 cc  Saurabh Guerrero MD  9/21/2020  08:55 EDT

## 2020-09-23 LAB
LAB AP CASE REPORT: NORMAL
PATH REPORT.FINAL DX SPEC: NORMAL

## 2020-09-29 ENCOUNTER — OFFICE VISIT (OUTPATIENT)
Dept: SURGERY | Facility: CLINIC | Age: 20
End: 2020-09-29

## 2020-09-29 VITALS
WEIGHT: 256 LBS | HEIGHT: 64 IN | HEART RATE: 90 BPM | DIASTOLIC BLOOD PRESSURE: 80 MMHG | SYSTOLIC BLOOD PRESSURE: 132 MMHG | TEMPERATURE: 98.5 F | BODY MASS INDEX: 43.71 KG/M2

## 2020-09-29 DIAGNOSIS — K81.9 CHOLECYSTITIS: Primary | ICD-10-CM

## 2020-09-29 PROCEDURE — 99024 POSTOP FOLLOW-UP VISIT: CPT | Performed by: SURGERY

## 2020-09-29 NOTE — PROGRESS NOTES
9/29/2020    Patient Information  Jayda Sharif  [address] [place] [address]  2000  [phone] (home)       Chief Complaint   Patient presents with   • Post-op Follow-up     Post Op Lap Barbara       HPI  Patient is a 19-year-old white female who is 8 days status post laparoscopic cholecystectomy by Dr. Iva Johansen.  She is concerned about her umbilical wound because she had a little bit of blood.    Past Medical History:   Diagnosis Date   • Anemia    • Asthma    • Diabetes mellitus (CMS/HCC)    • GERD (gastroesophageal reflux disease)    • Nonfunctioning gallbladder    • Sleep apnea        Past Surgical History:   Procedure Laterality Date   • CHOLECYSTECTOMY N/A 9/21/2020    Procedure: CHOLECYSTECTOMY LAPAROSCOPIC;  Surgeon: Saurabh Guerrero MD;  Location: [facility];  Service: General;  Laterality: N/A;       Family History   Problem Relation Age of Onset   • Hypertension Mother    • Diabetes Mother    • Cancer Mother    • No Known Problems Father    • Cancer Maternal Aunt    • Cancer Maternal Grandmother        Social History     Socioeconomic History   • Marital status: Single     Spouse name: Not on file   • Number of children: Not on file   • Years of education: Not on file   • Highest education level: Not on file   Tobacco Use   • Smoking status: Current Every Day Smoker     Packs/day: 0.50     Years: 2.00     Pack years: 1.00   • Smokeless tobacco: Never Used   Substance and Sexual Activity   • Alcohol use: Never     Frequency: Never   • Drug use: Never   • Sexual activity: Defer     Comment: pills       Current Medications  Current Outpatient Medications   Medication Sig Dispense Refill   • ketorolac (TORADOL) 10 MG tablet Take 1 tablet by mouth Every 8 (Eight) Hours As Needed for Severe Pain . 15 tablet 0   • METFORMIN HCL PO Take 500 mg by mouth Daily.     • ondansetron ODT (ZOFRAN-ODT) 4 MG disintegrating tablet Place 1 tablet on the tongue Every 6 (Six) Hours As Needed for Nausea  "or Vomiting. 12 tablet 0   • pantoprazole (Protonix) 40 MG EC tablet Take 1 tablet by mouth Daily for 60 doses. 30 tablet 1   • sertraline (ZOLOFT) 100 MG tablet Take 100 mg by mouth Daily.     • Sprintec 28 0.25-35 MG-MCG per tablet Take 1 tablet by mouth Daily.     • traMADol (Ultram) 50 MG tablet Take 1 tablet by mouth Every 6 (Six) Hours As Needed for Moderate Pain  for up to 15 doses. 15 tablet 0     No current facility-administered medications for this visit.        Allergies  Penicillins            Vitals:    09/29/20 1641   BP: 132/80   Pulse: 90   Temp: 98.5 °F (36.9 °C)     Body mass index is 43.94 kg/m².      09/29/20  1641   Weight: 116 kg (256 lb)     162.6 cm (64\")      Physical Exam  General 19-year-old white female no acute distress    Abdomen is appropriately tender for this stage postop.  All abdominal wounds are without evidence of infection.  Specifically her umbilical wound looks very good at this point.      Assessment   Status post cholecystectomy doing well        Plan  Reassured patient there wound is okay.  She is supposed to see Dr. Farrar on a tomorrow per routine.  Advised her that was not necessary.            This document signed by Momo Burkett MD September 29, 2020 16:43 EDT       "

## 2021-04-30 ENCOUNTER — OFFICE VISIT CONVERTED (OUTPATIENT)
Dept: FAMILY MEDICINE CLINIC | Facility: CLINIC | Age: 21
End: 2021-04-30
Attending: FAMILY MEDICINE

## 2021-05-14 VITALS
OXYGEN SATURATION: 99 % | TEMPERATURE: 97.6 F | DIASTOLIC BLOOD PRESSURE: 58 MMHG | WEIGHT: 256 LBS | SYSTOLIC BLOOD PRESSURE: 121 MMHG | RESPIRATION RATE: 16 BRPM | HEART RATE: 102 BPM | BODY MASS INDEX: 47.11 KG/M2 | HEIGHT: 62 IN

## 2021-05-14 NOTE — PROGRESS NOTES
Progress Note      Patient Name: Jayda Sharif   Patient ID: [id number]   Sex: Female   YOB: 2000    Primary Care Provider: Dasha Jhaveri DO   Referring Provider: Dasha Jhaveri DO    Visit Date: April 30, 2021    Provider: Asnelmo Stauffer DO   Location: [facility]   Location Address: [address] [place] [address]  [id number]   Location Phone: [phone]          Chief Complaint  · Pt complains of nausea and abdomen pain times 1 week states that she took pregnancy test and it was negative.      History Of Present Illness  Jayda Sharif is a 20 year old /White female who presents for evaluation and treatment of:      Patient presents complaining of abdominal pain nausea diarrhea has been ongoing for a week or so denies any sick contacts feel strongly can be related to gastroenteritis or similar no blood in her stool or mucus she denies any fever sick contacts palpitations chest pain shortness of breath or other       Past Medical History  Disease Name Date Onset Notes   Allergies --  --    Anemia --  --    Anxiety --  --    Depression --  --    Diabetes --  --    Low blood pressure --  --    Migraine headache --  --    Shortness of Breath --  --          Allergy List  Allergen Name Date Reaction Notes   PENICILLINS --  --  --        Allergies Reconciled  Social History  Finding Status Start/Stop Quantity Notes   Alcohol Never --/-- --  --    Tobacco Never --/-- --  --          Review of Systems  · Constitutional  o Denies  o : fever, fatigue, weight loss, weight gain  · Cardiovascular  o Denies  o : lower extremity edema, claudication, chest pressure, palpitations  · Respiratory  o Denies  o : shortness of breath, wheezing, cough, hemoptysis, dyspnea on exertion  · Gastrointestinal  o Denies  o : nausea, vomiting, diarrhea, constipation, abdominal pain      Vitals  Date Time BP Position Site L\R Cuff Size HR RR TEMP (F) WT  HT  BMI kg/m2 BSA m2 O2 Sat FR  "L/min FiO2 HC       04/30/2021 10:08 /58 Sitting    102 - R 16 97.6 256lbs 0oz 5'  2\" 46.82 2.25 99 %  21%          Physical Examination  · Constitutional  o Appearance  o : no acute distress, well-nourished  · Head and Face  o Head  o :   § Inspection  § : atraumatic, normocephalic  · Eyes  o Eyes  o : extraocular movements intact, no scleral icterus, no conjunctival injection  · Ears, Nose, Mouth and Throat  o Ears  o :   § External Ears  § : normal  o Nose  o :   § Intranasal Exam  § : nares patent  o Oral Cavity  o :   § Oral Mucosa  § : moist mucous membranes  · Respiratory  o Respiratory Effort  o : breathing comfortably, symmetric chest rise  o Auscultation of Lungs  o : clear to asculatation bilaterally, no wheezes, rales, or rhonchii  · Cardiovascular  o Heart  o :   § Auscultation of Heart  § : regular rate and rhythm, no murmurs, rubs, or gallops  o Peripheral Vascular System  o :   § Extremities  § : no edema  · Gastrointestinal  o Abdominal Examination  o :   § Abdomen  § : non-distender  · Neurologic  o Mental Status Examination  o :   § Orientation  § : grossly oriented to person, place and time  o Gait and Station  o :   § Gait Screening  § : normal gait  · Psychiatric  o General  o : normal mood and affect          Assessment  · Gastroenteritis     558.9/K52.9         We will prescribe Phenergan for nausea and increase in fluids, Imodium over-the-counter for diarrhea follow-up if no improvement or worse continue to drink fluids and monitor improvement can take a while to get back to more formed stools but should improve with resolution of this likely viral gastroenteritis    Follow-up as needed sooner if concerns       Plan  · Orders  o ACO-39: Current medications updated and reviewed (1159F, ) - 558.9/K52.9 - 04/30/2021  · Medications  o Medications have been Reconciled  o Transition of Care or Provider Policy  · Instructions  o Patient was educated/instructed on their diagnosis, " treatment and medications prior to discharge from the clinic today.  o Patient instructed to seek medical attention urgently for new or worsening symptoms.  o Risks, benefits, and alternatives were discussed with the patient. The patient is aware of risks associated with:  o Chronic conditions reviewed and taken into consideration for today's treatment plan.  o Electronically Identified Patient Education Materials Provided Electronically  · Disposition  o Call or Return if symptoms worsen or persist.  o Follow up later in week if no better            Electronically Signed by: Anselmo Stauffer DO -Author on April 30, 2021 10:30:09 AM

## 2021-05-15 VITALS
TEMPERATURE: 97.6 F | HEIGHT: 62 IN | SYSTOLIC BLOOD PRESSURE: 129 MMHG | BODY MASS INDEX: 48.86 KG/M2 | RESPIRATION RATE: 18 BRPM | WEIGHT: 265.5 LBS | DIASTOLIC BLOOD PRESSURE: 56 MMHG | OXYGEN SATURATION: 97 % | HEART RATE: 99 BPM

## 2021-05-15 VITALS
HEIGHT: 62 IN | OXYGEN SATURATION: 99 % | SYSTOLIC BLOOD PRESSURE: 99 MMHG | RESPIRATION RATE: 18 BRPM | TEMPERATURE: 97.8 F | HEART RATE: 90 BPM | WEIGHT: 262.25 LBS | DIASTOLIC BLOOD PRESSURE: 61 MMHG | BODY MASS INDEX: 48.26 KG/M2

## 2021-05-15 VITALS
TEMPERATURE: 98.3 F | RESPIRATION RATE: 18 BRPM | OXYGEN SATURATION: 99 % | SYSTOLIC BLOOD PRESSURE: 134 MMHG | BODY MASS INDEX: 49.02 KG/M2 | DIASTOLIC BLOOD PRESSURE: 77 MMHG | HEIGHT: 62 IN | WEIGHT: 266.37 LBS | HEART RATE: 103 BPM

## 2021-06-10 ENCOUNTER — TELEPHONE (OUTPATIENT)
Dept: FAMILY MEDICINE CLINIC | Facility: CLINIC | Age: 21
End: 2021-06-10

## 2021-06-10 NOTE — TELEPHONE ENCOUNTER
Caller: Jayda Sharif    Relationship to patient: Self    Best call back number: [phone]    Chief complaint: COUGH     Type of visit: SAME DAY    Requested date: TODAY

## 2021-07-26 ENCOUNTER — OFFICE VISIT (OUTPATIENT)
Dept: FAMILY MEDICINE CLINIC | Facility: CLINIC | Age: 21
End: 2021-07-26

## 2021-07-26 ENCOUNTER — LAB (OUTPATIENT)
Dept: LAB | Facility: HOSPITAL | Age: 21
End: 2021-07-26

## 2021-07-26 VITALS
OXYGEN SATURATION: 98 % | DIASTOLIC BLOOD PRESSURE: 72 MMHG | TEMPERATURE: 97.1 F | SYSTOLIC BLOOD PRESSURE: 125 MMHG | HEIGHT: 64 IN | BODY MASS INDEX: 43.4 KG/M2 | HEART RATE: 80 BPM | RESPIRATION RATE: 18 BRPM | WEIGHT: 254.2 LBS

## 2021-07-26 DIAGNOSIS — D64.9 ANEMIA, UNSPECIFIED TYPE: ICD-10-CM

## 2021-07-26 DIAGNOSIS — E11.9 TYPE 2 DIABETES MELLITUS WITHOUT COMPLICATION, WITHOUT LONG-TERM CURRENT USE OF INSULIN (HCC): ICD-10-CM

## 2021-07-26 DIAGNOSIS — G43.811 OTHER MIGRAINE WITH STATUS MIGRAINOSUS, INTRACTABLE: Primary | ICD-10-CM

## 2021-07-26 DIAGNOSIS — E66.01 CLASS 3 SEVERE OBESITY DUE TO EXCESS CALORIES WITH SERIOUS COMORBIDITY AND BODY MASS INDEX (BMI) OF 40.0 TO 44.9 IN ADULT (HCC): ICD-10-CM

## 2021-07-26 PROBLEM — I95.9 LOW BLOOD PRESSURE: Status: ACTIVE | Noted: 2021-07-26

## 2021-07-26 PROBLEM — F32.A DEPRESSION: Status: ACTIVE | Noted: 2021-07-26

## 2021-07-26 PROBLEM — J01.80 OTHER ACUTE SINUSITIS: Status: RESOLVED | Noted: 2021-07-26 | Resolved: 2021-07-26

## 2021-07-26 PROBLEM — G43.909 MIGRAINE HEADACHE: Status: ACTIVE | Noted: 2021-07-26

## 2021-07-26 PROBLEM — J01.80 OTHER ACUTE SINUSITIS: Status: ACTIVE | Noted: 2021-07-26

## 2021-07-26 PROBLEM — F41.9 ANXIETY: Status: ACTIVE | Noted: 2021-07-26

## 2021-07-26 PROBLEM — E66.813 CLASS 3 SEVERE OBESITY DUE TO EXCESS CALORIES WITH SERIOUS COMORBIDITY AND BODY MASS INDEX (BMI) OF 40.0 TO 44.9 IN ADULT: Status: ACTIVE | Noted: 2021-07-26

## 2021-07-26 LAB
BASOPHILS # BLD AUTO: 0.07 10*3/MM3 (ref 0–0.2)
BASOPHILS NFR BLD AUTO: 0.9 % (ref 0–1.5)
DEPRECATED RDW RBC AUTO: 39.3 FL (ref 37–54)
EOSINOPHIL # BLD AUTO: 0.12 10*3/MM3 (ref 0–0.4)
EOSINOPHIL NFR BLD AUTO: 1.5 % (ref 0.3–6.2)
ERYTHROCYTE [DISTWIDTH] IN BLOOD BY AUTOMATED COUNT: 15.2 % (ref 12.3–15.4)
FERRITIN SERPL-MCNC: 6.01 NG/ML (ref 13–150)
FOLATE SERPL-MCNC: 12.9 NG/ML (ref 4.78–24.2)
HBA1C MFR BLD: 4.62 % (ref 4.8–5.6)
HCT VFR BLD AUTO: 38.2 % (ref 34–46.6)
HGB BLD-MCNC: 11.9 G/DL (ref 12–15.9)
LYMPHOCYTES # BLD AUTO: 2.19 10*3/MM3 (ref 0.7–3.1)
LYMPHOCYTES NFR BLD AUTO: 28 % (ref 19.6–45.3)
MCH RBC QN AUTO: 22.5 PG (ref 26.6–33)
MCHC RBC AUTO-ENTMCNC: 31.2 G/DL (ref 31.5–35.7)
MCV RBC AUTO: 72.3 FL (ref 79–97)
MONOCYTES # BLD AUTO: 0.49 10*3/MM3 (ref 0.1–0.9)
MONOCYTES NFR BLD AUTO: 6.3 % (ref 5–12)
NEUTROPHILS NFR BLD AUTO: 4.92 10*3/MM3 (ref 1.7–7)
NEUTROPHILS NFR BLD AUTO: 62.9 % (ref 42.7–76)
PLATELET # BLD AUTO: 511 10*3/MM3 (ref 140–450)
PMV BLD AUTO: 9.9 FL (ref 6–12)
RBC # BLD AUTO: 5.28 10*6/MM3 (ref 3.77–5.28)
VIT B12 BLD-MCNC: 301 PG/ML (ref 211–946)
WBC # BLD AUTO: 7.82 10*3/MM3 (ref 3.4–10.8)

## 2021-07-26 PROCEDURE — 84466 ASSAY OF TRANSFERRIN: CPT

## 2021-07-26 PROCEDURE — 83036 HEMOGLOBIN GLYCOSYLATED A1C: CPT

## 2021-07-26 PROCEDURE — 83540 ASSAY OF IRON: CPT

## 2021-07-26 PROCEDURE — 96372 THER/PROPH/DIAG INJ SC/IM: CPT | Performed by: FAMILY MEDICINE

## 2021-07-26 PROCEDURE — 82728 ASSAY OF FERRITIN: CPT

## 2021-07-26 PROCEDURE — 82746 ASSAY OF FOLIC ACID SERUM: CPT

## 2021-07-26 PROCEDURE — 99214 OFFICE O/P EST MOD 30 MIN: CPT | Performed by: FAMILY MEDICINE

## 2021-07-26 PROCEDURE — 85025 COMPLETE CBC W/AUTO DIFF WBC: CPT

## 2021-07-26 PROCEDURE — 82607 VITAMIN B-12: CPT

## 2021-07-26 PROCEDURE — 36415 COLL VENOUS BLD VENIPUNCTURE: CPT

## 2021-07-26 RX ORDER — METFORMIN HYDROCHLORIDE 500 MG/1
500 TABLET, EXTENDED RELEASE ORAL
Qty: 30 TABLET | Refills: 5 | Status: SHIPPED | OUTPATIENT
Start: 2021-07-26 | End: 2021-12-02

## 2021-07-26 RX ORDER — PROMETHAZINE HYDROCHLORIDE 25 MG/ML
25 INJECTION, SOLUTION INTRAMUSCULAR; INTRAVENOUS EVERY 6 HOURS PRN
Status: DISCONTINUED | OUTPATIENT
Start: 2021-07-26 | End: 2021-10-21

## 2021-07-26 RX ORDER — KETOROLAC TROMETHAMINE 30 MG/ML
60 INJECTION, SOLUTION INTRAMUSCULAR; INTRAVENOUS EVERY 6 HOURS PRN
Status: SHIPPED | OUTPATIENT
Start: 2021-07-26 | End: 2021-07-31

## 2021-07-26 RX ORDER — PROMETHAZINE HYDROCHLORIDE 25 MG/ML
25 INJECTION, SOLUTION INTRAMUSCULAR; INTRAVENOUS EVERY 6 HOURS PRN
Status: DISCONTINUED | OUTPATIENT
Start: 2021-07-26 | End: 2021-07-26

## 2021-07-26 RX ORDER — DULOXETIN HYDROCHLORIDE 30 MG/1
30 CAPSULE, DELAYED RELEASE ORAL DAILY
Qty: 30 CAPSULE | Refills: 5 | Status: SHIPPED | OUTPATIENT
Start: 2021-07-26 | End: 2021-09-08 | Stop reason: SDUPTHER

## 2021-07-26 RX ORDER — RIMEGEPANT SULFATE 75 MG/75MG
1 TABLET, ORALLY DISINTEGRATING ORAL DAILY PRN
Qty: 10 TABLET | Refills: 2 | Status: ON HOLD | OUTPATIENT
Start: 2021-07-26 | End: 2022-02-11

## 2021-07-26 RX ADMIN — PROMETHAZINE HYDROCHLORIDE 25 MG: 25 INJECTION, SOLUTION INTRAMUSCULAR; INTRAVENOUS at 17:18

## 2021-07-26 RX ADMIN — KETOROLAC TROMETHAMINE 60 MG: 30 INJECTION, SOLUTION INTRAMUSCULAR; INTRAVENOUS at 16:59

## 2021-07-26 NOTE — PROGRESS NOTES
"Chief Complaint  Headache, Anxiety, Nausea, Menstrual Problem (longer periods lasting 10days ), and Medication Problem (hasnt been taking any of her medication wants to start back )    Subjective          Jayda Sharif presents to [facility]  She is here today for follow-up for the management of her chronic medical conditions and an acute visit.  She has past medical history significant for type 2 diabetes, anxiety, migraine headache, morbid obesity and iron deficiency anemia.    She is lost 9 pounds since her last visit.    She has stopped taking her meds because she lost them.     She is having about 2 migraines per week.     She also is having some menstrual irregularities.     The patient has no other complaints today and denies chest pain, shortness of breath, weakness, numbness, nausea, vomiting, diarrhea, dizziness or syncopal event.        Objective   Vital Signs:   /72 (BP Location: Left arm, Patient Position: Sitting)   Pulse 80   Temp 97.1 °F (36.2 °C)   Resp 18   Ht 162.6 cm (64\")   Wt 115 kg (254 lb 3.2 oz)   SpO2 98%   BMI 43.63 kg/m²     Physical Exam  Vitals reviewed.   Constitutional:       Appearance: Normal appearance. She is well-developed. She is obese.   HENT:      Head: Normocephalic and atraumatic.      Right Ear: External ear normal.      Left Ear: External ear normal.      Mouth/Throat:      Pharynx: No oropharyngeal exudate.   Eyes:      Conjunctiva/sclera: Conjunctivae normal.      Pupils: Pupils are equal, round, and reactive to light.   Neck:      Vascular: No carotid bruit.   Cardiovascular:      Rate and Rhythm: Normal rate and regular rhythm.      Heart sounds: No murmur heard.   No friction rub. No gallop.    Pulmonary:      Effort: Pulmonary effort is normal.      Breath sounds: Normal breath sounds. No wheezing or rhonchi.   Abdominal:      General: Bowel sounds are normal. There is no distension.      Palpations: Abdomen is " soft.      Tenderness: There is no abdominal tenderness.   Skin:     General: Skin is warm and dry.   Neurological:      Mental Status: She is alert and oriented to person, place, and time.      Cranial Nerves: No cranial nerve deficit.      Motor: No weakness.   Psychiatric:         Mood and Affect: Mood and affect normal.         Behavior: Behavior normal.         Thought Content: Thought content normal.         Judgment: Judgment normal.        Result Review :     CBC    CBC 4/30/21 5/30/21 7/26/21   WBC 7.81 10.19 7.82   RBC 5.17 4.97 5.28   Hemoglobin 11.4 (A) 11.1 (A) 11.9 (A)   Hematocrit 37.4 36.0 (A) 38.2   MCV 72.3 (A) 72.4 (A) 72.3 (A)   MCH 22.1 (A) 22.3 (A) 22.5 (A)   MCHC 30.5 (A) 30.8 (A) 31.2 (A)   RDW 17.1 (A) 16.4 (A) 15.2   Platelets 393 458 (A) 511 (A)   (A) Abnormal value            BMP    BMP 3/21/21 4/30/21 5/30/21   Glucose 95 89 101 (A)   BUN 8 9 12   Creatinine 0.75 0.68 0.77   Sodium 138 136 136   Potassium 3.4 (A) 3.7 3.9   Chloride 103 102 101   CO2 24 24 28   Calcium 8.9 8.6 (A) 8.7   (A) Abnormal value       Comments are available for some flowsheets but are not being displayed.           HgB    HGB 4/30/21 5/30/21 7/26/21   Hemoglobin 11.4 (A) 11.1 (A) 11.9 (A)   (A) Abnormal value                      Assessment and Plan    Diagnoses and all orders for this visit:    1. Other migraine with status migrainosus, intractable (Primary)  Assessment & Plan:  The patient was given a shot today of 60 mg of Toradol and 25 mg of Phenergan in the clinic.  She was discharged home with a prescription of Nurtec to be taken as directed for migraine abortive therapy.      Orders:  -     Discontinue: promethazine (PHENERGAN) injection 25 mg  -     ketorolac (TORADOL) injection 60 mg  -     promethazine (PHENERGAN) injection 25 mg    2. Anemia, unspecified type  -     CBC w AUTO Differential; Future  -     Iron and TIBC; Future  -     Ferritin; Future  -     Vitamin B12; Future  -     Folate;  Future    3. Type 2 diabetes mellitus without complication, without long-term current use of insulin (CMS/[facility])  Assessment & Plan:  The patient was given lab order today for a hemoglobin A1c and microalbumin both to be managed according to findings.  The patient's diabetes currently well controlled.  Hemoglobin A1c today was 4.62.  Should be continued on Metformin due to high suspicion that she has PCOS that is probably causing her menstrual irregularities.    Orders:  -     Hemoglobin A1c; Future  -     MicroAlbumin, Urine, Random - Urine, Clean Catch; Future    4. Class 3 severe obesity due to excess calories with serious comorbidity and body mass index (BMI) of 40.0 to 44.9 in adult (CMS/[facility])  Assessment & Plan:  The patient is lost 9 pounds since her last visit.  Diet, weight loss and exercise were highly encouraged today's visit.      Other orders  -     DULoxetine (CYMBALTA) 30 MG capsule; Take 1 capsule by mouth Daily.  Dispense: 30 capsule; Refill: 5  -     metFORMIN ER (Glucophage XR) 500 MG 24 hr tablet; Take 1 tablet by mouth Daily With Breakfast.  Dispense: 30 tablet; Refill: 5  -     Rimegepant Sulfate (Nurtec) 75 MG tablet dispersible tablet; Take 1 tablet by mouth Daily As Needed (headache).  Dispense: 10 tablet; Refill: 2    I spent 48 minutes caring for Jayda on this date of service. This time includes time spent by me in the following activities:reviewing tests, performing a medically appropriate examination and/or evaluation , counseling and educating the patient/family/caregiver, ordering medications, tests, or procedures, documenting information in the medical record and independently interpreting results and communicating that information with the patient/family/caregiver  Follow Up   Return in about 6 weeks (around 9/6/2021).  Patient was given instructions and counseling regarding her condition or for health maintenance advice. Please see specific information pulled into the AVS if  appropriate.

## 2021-07-27 LAB
IRON 24H UR-MRATE: 34 MCG/DL (ref 37–145)
IRON SATN MFR SERPL: 6 % (ref 20–50)
TIBC SERPL-MCNC: 535 MCG/DL (ref 298–536)
TRANSFERRIN SERPL-MCNC: 359 MG/DL (ref 200–360)

## 2021-07-30 NOTE — ASSESSMENT & PLAN NOTE
The patient was given a shot today of 60 mg of Toradol and 25 mg of Phenergan in the clinic.  She was discharged home with a prescription of Nurtec to be taken as directed for migraine abortive therapy.

## 2021-07-30 NOTE — ASSESSMENT & PLAN NOTE
The patient was given lab order today for a hemoglobin A1c and microalbumin both to be managed according to findings.  The patient's diabetes currently well controlled.  Hemoglobin A1c today was 4.62.  Should be continued on Metformin due to high suspicion that she has PCOS that is probably causing her menstrual irregularities.

## 2021-07-30 NOTE — ASSESSMENT & PLAN NOTE
The patient is lost 9 pounds since her last visit.  Diet, weight loss and exercise were highly encouraged today's visit.

## 2021-08-05 ENCOUNTER — TELEPHONE (OUTPATIENT)
Dept: FAMILY MEDICINE CLINIC | Facility: CLINIC | Age: 21
End: 2021-08-05

## 2021-08-05 ENCOUNTER — HOSPITAL ENCOUNTER (EMERGENCY)
Facility: HOSPITAL | Age: 21
Discharge: HOME | End: 2021-08-05
Attending: EMERGENCY MEDICINE | Admitting: EMERGENCY MEDICINE

## 2021-08-05 ENCOUNTER — APPOINTMENT (OUTPATIENT)
Dept: CARDIOLOGY | Facility: HOSPITAL | Age: 21
End: 2021-08-05

## 2021-08-05 ENCOUNTER — TELEMEDICINE (OUTPATIENT)
Dept: FAMILY MEDICINE CLINIC | Facility: TELEHEALTH | Age: 21
End: 2021-08-05

## 2021-08-05 VITALS — HEIGHT: 64 IN | TEMPERATURE: 97.3 F | WEIGHT: 256 LBS | BODY MASS INDEX: 43.71 KG/M2

## 2021-08-05 VITALS
BODY MASS INDEX: 42.72 KG/M2 | OXYGEN SATURATION: 97 % | DIASTOLIC BLOOD PRESSURE: 57 MMHG | SYSTOLIC BLOOD PRESSURE: 105 MMHG | TEMPERATURE: 97.3 F | WEIGHT: 250.22 LBS | HEART RATE: 62 BPM | HEIGHT: 64 IN | RESPIRATION RATE: 16 BRPM

## 2021-08-05 DIAGNOSIS — S86.811A STRAIN OF CALF MUSCLE, RIGHT, INITIAL ENCOUNTER: Primary | ICD-10-CM

## 2021-08-05 DIAGNOSIS — R58 ECCHYMOSIS: ICD-10-CM

## 2021-08-05 DIAGNOSIS — M79.604 RIGHT LEG PAIN: Primary | ICD-10-CM

## 2021-08-05 LAB
BH CV LOWER VASCULAR LEFT COMMON FEMORAL AUGMENT: NORMAL
BH CV LOWER VASCULAR LEFT COMMON FEMORAL COMPETENT: NORMAL
BH CV LOWER VASCULAR LEFT COMMON FEMORAL COMPRESS: NORMAL
BH CV LOWER VASCULAR LEFT COMMON FEMORAL PHASIC: NORMAL
BH CV LOWER VASCULAR LEFT COMMON FEMORAL SPONT: NORMAL
BH CV LOWER VASCULAR RIGHT COMMON FEMORAL AUGMENT: NORMAL
BH CV LOWER VASCULAR RIGHT COMMON FEMORAL COMPETENT: NORMAL
BH CV LOWER VASCULAR RIGHT COMMON FEMORAL COMPRESS: NORMAL
BH CV LOWER VASCULAR RIGHT COMMON FEMORAL PHASIC: NORMAL
BH CV LOWER VASCULAR RIGHT COMMON FEMORAL SPONT: NORMAL
BH CV LOWER VASCULAR RIGHT DISTAL FEMORAL COMPRESS: NORMAL
BH CV LOWER VASCULAR RIGHT GREATER SAPH AK COMPRESS: NORMAL
BH CV LOWER VASCULAR RIGHT MID FEMORAL AUGMENT: NORMAL
BH CV LOWER VASCULAR RIGHT MID FEMORAL COMPETENT: NORMAL
BH CV LOWER VASCULAR RIGHT MID FEMORAL COMPRESS: NORMAL
BH CV LOWER VASCULAR RIGHT MID FEMORAL PHASIC: NORMAL
BH CV LOWER VASCULAR RIGHT MID FEMORAL SPONT: NORMAL
BH CV LOWER VASCULAR RIGHT PERONEAL COMPRESS: NORMAL
BH CV LOWER VASCULAR RIGHT POPLITEAL AUGMENT: NORMAL
BH CV LOWER VASCULAR RIGHT POPLITEAL COMPETENT: NORMAL
BH CV LOWER VASCULAR RIGHT POPLITEAL COMPRESS: NORMAL
BH CV LOWER VASCULAR RIGHT POPLITEAL PHASIC: NORMAL
BH CV LOWER VASCULAR RIGHT POPLITEAL SPONT: NORMAL
BH CV LOWER VASCULAR RIGHT POSTERIOR TIBIAL COMPRESS: NORMAL
BH CV LOWER VASCULAR RIGHT PROXIMAL FEMORAL COMPRESS: NORMAL
MAXIMAL PREDICTED HEART RATE: 199 BPM
STRESS TARGET HR: 169 BPM

## 2021-08-05 PROCEDURE — 99282 EMERGENCY DEPT VISIT SF MDM: CPT

## 2021-08-05 PROCEDURE — 93971 EXTREMITY STUDY: CPT

## 2021-08-05 PROCEDURE — 93971 EXTREMITY STUDY: CPT | Performed by: SURGERY

## 2021-08-05 PROCEDURE — 99212 OFFICE O/P EST SF 10 MIN: CPT | Performed by: NURSE PRACTITIONER

## 2021-08-05 RX ORDER — TRAMADOL HYDROCHLORIDE 50 MG/1
50 TABLET ORAL EVERY 6 HOURS PRN
Qty: 12 TABLET | Refills: 0 | Status: SHIPPED | OUTPATIENT
Start: 2021-08-05 | End: 2021-09-08

## 2021-08-05 RX ORDER — METHOCARBAMOL 750 MG/1
750 TABLET, FILM COATED ORAL 3 TIMES DAILY PRN
Qty: 15 TABLET | Refills: 0 | Status: ON HOLD | OUTPATIENT
Start: 2021-08-05 | End: 2022-02-11

## 2021-08-05 RX ORDER — IBUPROFEN 800 MG/1
800 TABLET ORAL EVERY 8 HOURS PRN
Qty: 20 TABLET | Refills: 0 | Status: SHIPPED | OUTPATIENT
Start: 2021-08-05 | End: 2021-10-21

## 2021-08-05 NOTE — DISCHARGE INSTRUCTIONS
Your ultrasound today looked normal, and no blood clots were seen.    You most likely have a bad strain or partial tear of the right calf muscle, which typically heals with just time and rest.

## 2021-08-05 NOTE — ED PROVIDER NOTES
"Subjective   Pt is a 20 y.o. female who presents to ED via private vehicle accompanied by significant other c/o LEG PAIN. This started 3 days ago and is still present and constant. It is described as moderate in severity. Pain exacerbated by weight bearing and ambulating. Improved with rest. Not improved with aspirin.     The pain is located to the right calf. She denies injury but states that she has been \"working 24/7\". She also states that the RLE feels like it is \"freezing\". No fever, cough, SOB, or diarrhea. No loss of taste or smell.     No recent abx use.       History provided by:  Patient      Review of Systems   Constitutional: Negative for chills, diaphoresis and fever.   HENT: Negative for ear discharge and nosebleeds.    Eyes: Negative for photophobia.   Respiratory: Negative for cough and shortness of breath.    Cardiovascular: Negative for chest pain.   Gastrointestinal: Negative for diarrhea, nausea and vomiting.   Genitourinary: Negative for dysuria.   Musculoskeletal: Negative for back pain and neck pain.        Right calf pain.    Skin: Negative for rash.   Neurological: Negative for headaches.   All other systems reviewed and are negative.      Past Medical History:   Diagnosis Date   • Allergies    • Anemia    • Anxiety    • Asthma    • Class 3 severe obesity due to excess calories with serious comorbidity and body mass index (BMI) of 40.0 to 44.9 in adult (CMS/[facility]) 7/26/2021   • Depression    • Diabetes mellitus (CMS/[facility])    • GERD (gastroesophageal reflux disease)    • Low blood pressure    • Migraine headache    • Nonfunctioning gallbladder    • Sleep apnea    • SOB (shortness of breath)        Allergies   Allergen Reactions   • Penicillins Swelling     Unsure if throat swells. Just knows it caused swelling and rash.  Happened as a baby Hives         Past Surgical History:   Procedure Laterality Date   • CHOLECYSTECTOMY N/A 9/21/2020    Procedure: CHOLECYSTECTOMY LAPAROSCOPIC;  Surgeon: " Saurabh Guerrero MD;  Location: [facility];  Service: General;  Laterality: N/A;       Family History   Problem Relation Age of Onset   • Hypertension Mother    • Diabetes Mother    • Cancer Mother    • No Known Problems Father    • Cancer Maternal Aunt    • Cancer Maternal Grandmother        Social History     Socioeconomic History   • Marital status: Single     Spouse name: Not on file   • Number of children: Not on file   • Years of education: Not on file   • Highest education level: Not on file   Tobacco Use   • Smoking status: Current Every Day Smoker     Packs/day: 0.50     Years: 2.00     Pack years: 1.00   • Smokeless tobacco: Never Used   Vaping Use   • Vaping Use: Never used   Substance and Sexual Activity   • Alcohol use: Never   • Drug use: Never   • Sexual activity: Defer     Comment: pills         Objective   Physical Exam  Vitals and nursing note reviewed.   Constitutional:       General: She is not in acute distress.     Appearance: Normal appearance.   HENT:      Head: Normocephalic and atraumatic.      Nose: Nose normal.   Eyes:      General: No scleral icterus.  Cardiovascular:      Rate and Rhythm: Normal rate and regular rhythm.      Pulses:           Dorsalis pedis pulses are 2+ on the right side and 2+ on the left side.      Heart sounds: Normal heart sounds.   Pulmonary:      Effort: Pulmonary effort is normal. No respiratory distress.      Breath sounds: Normal breath sounds.   Abdominal:      Palpations: Abdomen is soft.      Tenderness: There is no abdominal tenderness.   Musculoskeletal:         General: Normal range of motion.      Cervical back: Neck supple.      Right lower leg: No edema.      Left lower leg: No edema.      Comments: Right calf tenderness to posterior calf muscle. No tenderness to right achilles tendon.    Skin:     General: Skin is warm and dry.   Neurological:      General: No focal deficit present.      Mental Status: She is alert and oriented to person, place, and  time.      Sensory: No sensory deficit.      Motor: No weakness.         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    Family was concerned about possible DVT, which she does not have any history of, but given her calf pain and tenderness.    My exam notable for tenderness of the posterior right calf muscle overlying gastrocnemius, but no evidence of Achilles tendon rupture and no swelling or discoloration seen.    She is neurovascularly intact distal to her discomfort.    Vascular Doppler ultrasound was performed and came back normal, without any signs of DVT.    I conclude she has a right calf muscle strain and we talked about supportive care instructions including rest, I will give a work note, NSAIDs and muscle relaxants, heating pad.    In addition she notes some bruising in a linear distribution across the lower abdomen.    On physical exam she is mildly tender through that area, overlying her abdominal pannus there is some old yellowish appearing bruising, but I believe clinically is consistent with her carrying heavy cases of beer at her job and leaning them onto her abdomen while carrying.    There has been no reported trauma or motor vehicle accidents.    She looks stable for discharge.      Final diagnoses:   Strain of calf muscle, right, initial encounter   Ecchymosis       Documentation assistance provided by anamaria Chong.  Information recorded by the scribe was done at my direction and has been verified and validated by me.     Toyin Chogn  08/05/21 1110       Danilo Cannon MD  08/05/21 115

## 2021-08-05 NOTE — PROGRESS NOTES
CHIEF COMPLAINT  Cc:leg pain    HPI  Jayda Sharif is a 20 y.o. female  presents with complaint of of leg pain. The pain is in her right calf. It started 3 days ago and has not gotten any better.The back of her calf is red and swollen. She reports that the pain is better when the leg is at rest. Additionally she reports that she was seen at her PCP office on 07/26 and her platelets were elevated at 511. Significant other present for this visit.      Review of Systems   Constitutional: Negative for fever.   HENT: Negative for congestion and sore throat.    Respiratory: Negative for cough, shortness of breath and wheezing.    Cardiovascular: Negative for chest pain.   Gastrointestinal: Negative for diarrhea, nausea and vomiting.   Musculoskeletal:        Right calf pain, back of right calf red and swollen, pain improves with rest   Skin: Positive for color change (right calf). Negative for rash.   Neurological: Negative for headaches.       Past Medical History:   Diagnosis Date   • Allergies    • Anemia    • Anxiety    • Asthma    • Class 3 severe obesity due to excess calories with serious comorbidity and body mass index (BMI) of 40.0 to 44.9 in adult (CMS/[facility]) 7/26/2021   • Depression    • Diabetes mellitus (CMS/[facility])    • GERD (gastroesophageal reflux disease)    • Low blood pressure    • Migraine headache    • Nonfunctioning gallbladder    • Sleep apnea    • SOB (shortness of breath)        Family History   Problem Relation Age of Onset   • Hypertension Mother    • Diabetes Mother    • Cancer Mother    • No Known Problems Father    • Cancer Maternal Aunt    • Cancer Maternal Grandmother        Social History     Socioeconomic History   • Marital status: Single     Spouse name: Not on file   • Number of children: Not on file   • Years of education: Not on file   • Highest education level: Not on file   Tobacco Use   • Smoking status: Current Every Day Smoker     Packs/day: 0.50     Years: 2.00     Pack years:  "1.00   • Smokeless tobacco: Never Used   Vaping Use   • Vaping Use: Never used   Substance and Sexual Activity   • Alcohol use: Never   • Drug use: Never   • Sexual activity: Defer     Comment: pills         Temp 97.3 °F (36.3 °C)   Ht 162.6 cm (64\")   Wt 116 kg (256 lb)   Breastfeeding No   BMI 43.94 kg/m²     PHYSICAL EXAM  Physical Exam   Constitutional: She is oriented to person, place, and time. She appears well-developed and well-nourished.   HENT:   Head: Normocephalic and atraumatic.   Right Ear: Hearing and external ear normal.   Left Ear: Hearing and external ear normal.   Nose: Nose normal.   Mouth/Throat: Mouth/Lips are normal.  Eyes: Lids are normal. Right eye exhibits no discharge and no exudate. Left eye exhibits no discharge and no exudate. Right conjunctiva is not injected. Left conjunctiva is not injected.   Cardiovascular:       Right posterior calf mildly erythematous and edematous   Pulmonary/Chest: No accessory muscle usage. No tachypnea and no bradypnea.  No respiratory distress.No use of oxygen by nasal cannulaNo use of oxygen by mask noted.  Abdominal: Abdomen appears normal.   Neurological: She is alert and oriented to person, place, and time. No cranial nerve deficit.   Skin: Her skin appears normal.  Psychiatric: She has a normal mood and affect. Her speech is normal and behavior is normal. Judgment and thought content normal.       Results for orders placed or performed in visit on 07/26/21   Iron and TIBC    Specimen: Blood   Result Value Ref Range    Iron 34 (L) 37 - 145 mcg/dL    Iron Saturation 6 (L) 20 - 50 %    Transferrin 359 200 - 360 mg/dL    TIBC 535 298 - 536 mcg/dL   Ferritin    Specimen: Blood   Result Value Ref Range    Ferritin 6.01 (L) 13.00 - 150.00 ng/mL   Vitamin B12    Specimen: Blood   Result Value Ref Range    Vitamin B-12 301 211 - 946 pg/mL   Folate    Specimen: Blood   Result Value Ref Range    Folate 12.90 4.78 - 24.20 ng/mL   Hemoglobin A1c    Specimen: " Blood   Result Value Ref Range    Hemoglobin A1C 4.62 (L) 4.80 - 5.60 %   CBC Auto Differential    Specimen: Blood   Result Value Ref Range    WBC 7.82 3.40 - 10.80 10*3/mm3    RBC 5.28 3.77 - 5.28 10*6/mm3    Hemoglobin 11.9 (L) 12.0 - 15.9 g/dL    Hematocrit 38.2 34.0 - 46.6 %    MCV 72.3 (L) 79.0 - 97.0 fL    MCH 22.5 (L) 26.6 - 33.0 pg    MCHC 31.2 (L) 31.5 - 35.7 g/dL    RDW 15.2 12.3 - 15.4 %    RDW-SD 39.3 37.0 - 54.0 fl    MPV 9.9 6.0 - 12.0 fL    Platelets 511 (H) 140 - 450 10*3/mm3    Neutrophil % 62.9 42.7 - 76.0 %    Lymphocyte % 28.0 19.6 - 45.3 %    Monocyte % 6.3 5.0 - 12.0 %    Eosinophil % 1.5 0.3 - 6.2 %    Basophil % 0.9 0.0 - 1.5 %    Neutrophils, Absolute 4.92 1.70 - 7.00 10*3/mm3    Lymphocytes, Absolute 2.19 0.70 - 3.10 10*3/mm3    Monocytes, Absolute 0.49 0.10 - 0.90 10*3/mm3    Eosinophils, Absolute 0.12 0.00 - 0.40 10*3/mm3    Basophils, Absolute 0.07 0.00 - 0.20 10*3/mm3       Diagnoses and all orders for this visit:    1. Right leg pain (Primary)      FOLLOW-UP  Patient advised to proceed to ER for higher level of evaluation  Hold birth control until seen    Patient verbalizes understanding of medication dosage, comfort measures, instructions for treatment and follow-up.    KAVITA Wan  08/05/2021  05:24 EDT    This visit was performed via Telehealth.  This patient has been instructed to follow-up with their primary care provider if their symptoms worsen or the treatment provided does not resolve their illness.

## 2021-08-05 NOTE — ED TRIAGE NOTES
Patient states that this started about 3 days ago while she was at work. Patient states that it has progressively gotten worse. Patient states that there is tons of pain when she moves her toes.

## 2021-08-06 RX ORDER — FERROUS SULFATE 325(65) MG
325 TABLET ORAL EVERY OTHER DAY
Qty: 45 TABLET | Refills: 2 | Status: SHIPPED | OUTPATIENT
Start: 2021-08-06 | End: 2021-08-25 | Stop reason: SDUPTHER

## 2021-08-06 NOTE — TELEPHONE ENCOUNTER
----- Message from Dasha Jhaveri DO sent at 7/27/2021  8:28 AM EDT -----  The patient has low iron. She needs ferrous sufate 325 mg every other day for 3 months then recheck CBC and iron. She is anemic with a hgb of 11.9 which is to be expected. She is not diabetic. Please send in the iron. Thanks.

## 2021-08-24 ENCOUNTER — TELEMEDICINE (OUTPATIENT)
Dept: FAMILY MEDICINE CLINIC | Facility: TELEHEALTH | Age: 21
End: 2021-08-24

## 2021-08-24 DIAGNOSIS — B97.89 VIRAL RESPIRATORY INFECTION: Primary | ICD-10-CM

## 2021-08-24 DIAGNOSIS — J98.8 VIRAL RESPIRATORY INFECTION: Primary | ICD-10-CM

## 2021-08-24 PROCEDURE — 99213 OFFICE O/P EST LOW 20 MIN: CPT | Performed by: NURSE PRACTITIONER

## 2021-08-24 NOTE — PROGRESS NOTES
CHIEF COMPLAINT  Chief Complaint   Patient presents with   • URI         HPI  Jayda Sharif is a 20 y.o. female  presents with complaint of nausea and vomiting with cough and nasal congestion that started yesterday and continues today. She has an appointment with [facility] in [facility]. Tomorrow for COVID-19 testing, but will not see her and she needs a work notes.   She has ondansetron (Zofran) for nausea   Review of Systems   Constitutional: Positive for chills, fatigue and fever. Negative for diaphoresis.   HENT: Positive for congestion, postnasal drip, rhinorrhea and sore throat. Negative for sinus pressure, sinus pain and sneezing.    Respiratory: Positive for cough. Negative for shortness of breath and wheezing.    Cardiovascular: Negative for chest pain.   Gastrointestinal: Positive for nausea and vomiting. Negative for diarrhea.   Musculoskeletal: Positive for arthralgias and myalgias.   Neurological: Positive for headaches.   Hematological: Negative for adenopathy.       Past Medical History:   Diagnosis Date   • Allergies    • Anemia    • Anxiety    • Asthma    • Class 3 severe obesity due to excess calories with serious comorbidity and body mass index (BMI) of 40.0 to 44.9 in adult (CMS/[facility]) 7/26/2021   • Depression    • Diabetes mellitus (CMS/[facility])    • GERD (gastroesophageal reflux disease)    • Low blood pressure    • Migraine headache    • Nonfunctioning gallbladder    • Sleep apnea    • SOB (shortness of breath)        Family History   Problem Relation Age of Onset   • Hypertension Mother    • Diabetes Mother    • Cancer Mother    • No Known Problems Father    • Cancer Maternal Aunt    • Cancer Maternal Grandmother        Social History     Socioeconomic History   • Marital status: Single     Spouse name: Not on file   • Number of children: Not on file   • Years of education: Not on file   • Highest education level: Not on file   Tobacco Use   • Smoking status: Current  Every Day Smoker     Packs/day: 0.50     Years: 2.00     Pack years: 1.00   • Smokeless tobacco: Never Used   Vaping Use   • Vaping Use: Never used   Substance and Sexual Activity   • Alcohol use: Never   • Drug use: Never   • Sexual activity: Defer     Comment: pills         LMP 08/24/2021 (Exact Date)   Breastfeeding No     PHYSICAL EXAM  Physical Exam   Constitutional: She is oriented to person, place, and time. She has a sickly appearance. She does not appear ill. No distress.   Eyes: EOM are normal.   Neck: Neck normal appearance.  Pulmonary/Chest: Effort normal.  No respiratory distress.  Abdominal: Abdomen appears normal. Soft. She exhibits no distension. There is no abdominal tenderness.   Per patient    Neurological: She is alert and oriented to person, place, and time.   Skin: Skin is dry.   Psychiatric: She has a normal mood and affect.         Diagnoses and all orders for this visit:    1. Viral respiratory infection (Primary)  -     QUESTIONNAIRE SERIES          FOLLOW-UP  As discussed during visit with PCP/[facility] if no improvement or Urgent Care/Emergency Department if worsening of symptoms    Patient verbalizes understanding of medication dosage, comfort measures, instructions for treatment and follow-up.    Gisel Silverman, KAVITA  08/24/2021  13:14 EDT    This visit was performed via Telehealth.  This patient has been instructed to follow-up with their primary care provider if their symptoms worsen or the treatment provided does not resolve their illness.

## 2021-08-24 NOTE — PATIENT INSTRUCTIONS
Symptoms may worsen over the next several days.   If your nausea and vomiting do not subside in 24 hors or worsens see of your primary care provider can see you when you get the COVID-19 test or go to the emergency room.     For any shortness of breath or chest pain to the emergency department. If your symptoms do not improve in 5-7 days or improve then worsen follow up    If you are positive for COVID-19   You will need to remain quarantined for 10 days from onset of symptoms and only to discontinue if symptoms have improved and no fever for 24 hours without the use of fever reducing medications such as Tylenol (acetaminophen), Advil (ibuprfen), or Aleve (naproxen).    3 Key Steps to Take While Waiting for Your COVID-19 Test Result  To help stop the spread of COVID-19, take these 3 key steps NOW while waiting for your test results:  1. Stay home and monitor your health.  Stay home and monitor your health to help protect your friends, family, and others from possibly getting COVID-19 from you.  Stay home and away from others:  · If possible, stay away from others, especially people who are at higher risk for getting very sick from COVID-19, such as older adults and people with other medical conditions.  · If you have been in contact with someone with COVID-19, stay home and away from others for 14 days after your last contact with that person.  · If you have a fever, cough or other symptoms of COVID-19, stay home and away from others (except to get medical care).  Monitor your health:  · Watch for fever, cough, shortness of breath, or other symptoms of COVID-19. Remember, symptoms may appear 2-14 days after exposure to COVID-19 and can include:  ? Fever or chills  ? Cough  ? Shortness of breath or difficulty breathing  ? Tiredness  ? Muscle or body aches  ? Headache  ? New loss of taste or smell  ? Sore throat  ? Congestion or runny nose  ? Nausea or vomiting  ? Diarrhea  2. Think about the people you have recently  been around.  If you are diagnosed with COVID-19, a public health worker may call you to check on your health, discuss who you have been around, and ask where you spent time while you may have been able to spread COVID-19 to others. While you wait for your COVID-19 test result, think about everyone you have been around recently. This will be important information to give health workers if your test is positive.   Complete the information on the back of this page to help you remember everyone you have been around.  3. Answer the phone call from the health department.  If a public health worker calls you, answer the call to help slow the spread of COVID-19 in your community.  · Discussions with health department staff are confidential. This means that your personal and medical information will be kept private and only shared with those who may need to know, like your health care provider.  · Your name will not be shared with those you came in contact with. The health department will only notify people you were in close contact with (within 6 feet for more than 15 minutes) that they might have been exposed to COVID-19.  Think about the people you have recently been around  If you test positive and are diagnosed with COVID-19, someone from the health department may call to check-in on your health, discuss who you have been around, and ask where you spent time while you may have been able to spread COVID-19 to others. This form can help you think about people you have recently been around so you will be ready if a public health worker calls you.  Things to think about. Have you:  · Gone to work or school?  · Gotten together with others (eaten out at a restaurant, gone out for drinks, exercised with others or gone to a gym, had friends or family over to your house, volunteered, gone to a party, pool, or park)?  · Gone to a store in person (e.g., grocery store, mall)?  · Gone to in-person appointments (e.g., salon, oleary,  doctor's or dentist's office)?  · Ridden in a car with others (e.g., Uber or Lyft) or took public transportation?  · Been inside a [employer], Protestant, [employer] or other places of Zoroastrianism?  Who lives with you?  · ______________________________________________________________________  · ______________________________________________________________________  · ______________________________________________________________________  · ______________________________________________________________________  Who have you been around (within 6 feet for more than 15 minutes) in the last 10 days? (You may have more people to list than the space provided. If so, write on the front of this sheet or a separate piece of paper.)  Name ______________________________________________  · Phone number ____________________________________  · Date you last saw them _____________________________  · Where you last saw them ________________________________________________  Name ______________________________________________  · Phone number ____________________________________  · Date you last saw them _____________________________  · Where you last saw them ________________________________________________  Name ______________________________________________  · Phone number ____________________________________  · Date you last saw them _____________________________  · Where you last saw them ________________________________________________  Name ______________________________________________  · Phone number ____________________________________  · Date you last saw them _____________________________  · Where you last saw them ________________________________________________  Name ______________________________________________  · Phone number ____________________________________  · Date you last saw them _____________________________  · Where you last saw them ________________________________________________  What have you done in the last 10 days  with other people?  Activity _____________________________________________  · Location _________________________________________  · Date ____________________________________________  Activity _____________________________________________  · Location _________________________________________  · Date ____________________________________________  Activity _____________________________________________  · Location _________________________________________  · Date ____________________________________________  Activity _____________________________________________  · Location _________________________________________  · Date ____________________________________________  Activity _____________________________________________  · Location _________________________________________  · Date ____________________________________________  cdc.gov/coronavirus  07/27/2020  This information is not intended to replace advice given to you by your health care provider. Make sure you discuss any questions you have with your health care provider.  Document Revised: 01/19/2021 Document Reviewed: 12/03/2020  [employer] Patient Education © 2021 [employer]  10 Things You Can Do to Manage Your COVID-19 Symptoms at Home  If you have possible or confirmed COVID-19:  1. Stay home from work and school. And stay away from other public places. If you must go out, avoid using any kind of public transportation, ridesharing, or taxis.  2. Monitor your symptoms carefully. If your symptoms get worse, call your healthcare provider immediately.  3. Get rest and stay hydrated.  4. If you have a medical appointment, call the healthcare provider ahead of time and tell them that you have or may have COVID-19.  5. For medical emergencies, call 911 and notify the dispatch personnel that you have or may have COVID-19.  6. Cover your cough and sneezes with a tissue or use the inside of your elbow.  7. Wash your hands often with soap and water for at least 20  seconds or clean your hands with an alcohol-based hand  that contains at least 60% alcohol.  8. As much as possible, stay in a specific room and away from other people in your home. Also, you should use a separate bathroom, if available. If you need to be around other people in or outside of the home, wear a mask.  9. Avoid sharing personal items with other people in your household, like dishes, towels, and bedding.  10. Clean all surfaces that are touched often, like counters, tabletops, and doorknobs. Use household cleaning sprays or wipes according to the label instructions.  cdc.gov/coronavirus  07/01/2020  This information is not intended to replace advice given to you by your health care provider. Make sure you discuss any questions you have with your health care provider.  Document Revised: 04/15/2021 Document Reviewed: 04/15/2021  [employer] Patient Education © 2021 [employer]  How to Quarantine at Home  Information for Patients and Families    These instructions are for people with confirmed or suspected COVID-19 who do not need to be hospitalized and those with confirmed COVID-19 who were hospitalized and discharged to care for themselves at home.    If you were tested through the Health Department  The Health Department will monitor your wellbeing.  If it is determined that you do not need to be hospitalized and can be isolated at home, you will be monitored by staff from your local or state health department.     If you were tested through a Commercial [facility]  You will need to monitor yourself and report changes in your symptoms to your doctor.  See the section below called Monitor Your Symptoms.    Follow these steps until a healthcare provider or local or state health department says you can return to your normal activities.    Stay home except to get medical care  • Restrict activities outside your home, except for getting medical care.   • Do not go to work, school, or public areas.   • Avoid  using public transportation, ride-sharing, or taxis.    Separate yourself from other people and animals in your home  People  As much as possible, you should stay in a specific room and away from other people in your home. Also, you should use a separate bathroom, if available.    Animals  You should restrict contact with pets and other animals while you are sick with COVID-19, just like you would around other people. When possible, have another member of your household care for your animals while you are sick. If you are sick with COVID-19, avoid contact with your pet, including petting, snuggling, being kissed or licked, and sharing food. If you must care for your pet or be around animals while you are sick, wash your hands before and after you interact with pets and wear a facemask. See COVID-19 and Animals for more information.    Call ahead before visiting your doctor  If you have a medical appointment, call the healthcare provider and tell them that you have or may have COVID-19. This information will help the healthcare provider’s office take steps to keep other people from getting infected or exposed.    Wear a facemask  You should wear a facemask when you are around other people (e.g., sharing a room or vehicle) or pets and before you enter a healthcare provider’s office.     If you are not able to wear a facemask (for example, because it causes trouble breathing), then people who live with you should not stay in the same room with you, or they should wear a facemask if they enter your room.    Cover your coughs and sneezes  • Cover your mouth and nose with a tissue when you cough or sneeze.   • Throw used tissues in a lined trash can.   • Immediately wash your hands with soap and water for at least 20 seconds or, if soap and water are not available, clean your hands with an alcohol-based hand  that contains at least 60% alcohol.    Clean your hands often  • Wash your hands often with soap and  water for at least 20 seconds, especially after blowing your nose, coughing, or sneezing; going to the bathroom; and before eating or preparing food.     • If soap and water are not readily available, use an alcohol-based hand  with at least 60% alcohol, covering all surfaces of your hands and rubbing them together until they feel dry.    • Soap and water are the best option if hands are visibly dirty. Avoid touching your eyes, nose, and mouth with unwashed hands.    Avoid sharing personal household items  • You should not share dishes, drinking glasses, cups, eating utensils, towels, or bedding with other people or pets in your home.   • After using these items, they should be washed thoroughly with soap and water.    Clean all “high-touch” surfaces everyday  • High touch surfaces include counters, tabletops, doorknobs, bathroom fixtures, toilets, phones, keyboards, tablets, and bedside tables.   • Also, clean any surfaces that may have blood, stool, or body fluids on them.   • Use a household cleaning spray or wipe, according to the label instructions. Labels contain instructions for safe and effective use of the cleaning product, including precautions you should take when applying the product, such as wearing gloves and making sure you have good ventilation during use of the product.    Monitor your symptoms  • Seek prompt medical attention if your illness is worsening (e.g., difficulty breathing).   • Before seeking care, call your healthcare provider and tell them that you have, or are being evaluated for, COVID-19.   • Put on a facemask before you enter the facility.     • These steps will help the healthcare provider’s office to keep other people in the office or waiting room from getting infected or exposed.   • Persons who are placed under active monitoring or facilitated self-monitoring should follow instructions provided by their local health department or occupational health professionals, as  appropriate.  • If you have a medical emergency and need to call 911, notify the dispatch personnel that you have, or are being evaluated for COVID-19. If possible, put on a facemask before emergency medical services arrive.    Discontinuing home isolation  Patients with confirmed COVID-19 should remain under home isolation precautions until the risk of secondary transmission to others is thought to be low. The decision to discontinue home isolation precautions should be made on a case-by-case basis, in consultation with healthcare providers and state and local health departments.    The below content are for household members, intimate partners, and caregivers of a patient with symptomatic laboratory-confirmed COVID-19 or a patient under investigation:    Household members, intimate partners, and caregivers may have close contact with a person with symptomatic, laboratory-confirmed COVID-19 or a person under investigation.     Close contacts should monitor their health; they should call their healthcare provider right away if they develop symptoms suggestive of COVID-19 (e.g., fever, cough, shortness of breath)     Close contacts should also follow these recommendations:  • Make sure that you understand and can help the patient follow their healthcare provider’s instructions for medication(s) and care. You should help the patient with basic needs in the home and provide support for getting groceries, prescriptions, and other personal needs.  • Monitor the patient’s symptoms. If the patient is getting sicker, call his or her healthcare provider and tell them that the patient has laboratory-confirmed COVID-19. This will help the healthcare provider’s office take steps to keep other people in the office or waiting room from getting infected. Ask the healthcare provider to call the local or state health department for additional guidance. If the patient has a medical emergency and you need to call 911, notify the  dispatch personnel that the patient has, or is being evaluated for COVID-19.  • Household members should stay in another room or be  from the patient as much as possible. Household members should use a separate bedroom and bathroom, if available.  • Prohibit visitors who do not have an essential need to be in the home.  • Household members should care for any pets in the home. Do not handle pets or other animals while sick.  For more information, see COVID-19 and Animals.  • Make sure that shared spaces in the home have good air flow, such as by an air conditioner or an opened window, weather permitting.  • Perform hand hygiene frequently. Wash your hands often with soap and water for at least 20 seconds or use an alcohol-based hand  that contains 60 to 95% alcohol, covering all surfaces of your hands and rubbing them together until they feel dry. Soap and water should be used preferentially if hands are visibly dirty.  • Avoid touching your eyes, nose, and mouth with unwashed hands.  • The patient should wear a facemask when you are around other people. If the patient is not able to wear a facemask (for example, because it causes trouble breathing), you, as the caregiver, should wear a mask when you are in the same room as the patient.  • Wear a disposable facemask and gloves when you touch or have contact with the patient’s blood, stool, or body fluids, such as saliva, sputum, nasal mucus, vomit, or urine.   o Throw out disposable facemasks and gloves after using them. Do not reuse.  o When removing personal protective equipment, first remove and dispose of gloves. Then, immediately clean your hands with soap and water or alcohol-based hand . Next, remove and dispose of facemask, and immediately clean your hands again with soap and water or alcohol-based hand .  • Avoid sharing household items with the patient. You should not share dishes, drinking glasses, cups, eating  utensils, towels, bedding, or other items. After the patient uses these items, you should wash them thoroughly (see below “Wash laundry thoroughly”).  • Clean all “high-touch” surfaces, such as counters, tabletops, doorknobs, bathroom fixtures, toilets, phones, keyboards, tablets, and bedside tables, every day. Also, clean any surfaces that may have blood, stool, or body fluids on them.   o Use a household cleaning spray or wipe, according to the label instructions. Labels contain instructions for safe and effective use of the cleaning product including precautions you should take when applying the product, such as wearing gloves and making sure you have good ventilation during use of the product.  • Wash laundry thoroughly.   o Immediately remove and wash clothes or bedding that have blood, stool, or body fluids on them.  o Wear disposable gloves while handling soiled items and keep soiled items away from your body. Clean your hands (with soap and water or an alcohol-based hand ) immediately after removing your gloves.  o Read and follow directions on labels of laundry or clothing items and detergent. In general, using a normal laundry detergent according to washing machine instructions and dry thoroughly using the warmest temperatures recommended on the clothing label.  • Place all used disposable gloves, facemasks, and other contaminated items in a lined container before disposing of them with other household waste. Clean your hands (with soap and water or an alcohol-based hand ) immediately after handling these items. Soap and water should be used preferentially if hands are visibly dirty.  • Discuss any additional questions with your state or local health department or healthcare provider.    Adapted from information provided by the Centers for Disease Control and Prevention.  For more information, visit https://www.cdc.gov/coronavirus/2019-ncov/hcp/guidance-prevent-spread.html

## 2021-08-25 ENCOUNTER — HOSPITAL ENCOUNTER (EMERGENCY)
Facility: HOSPITAL | Age: 21
Discharge: HOME | End: 2021-08-25
Attending: EMERGENCY MEDICINE | Admitting: EMERGENCY MEDICINE

## 2021-08-25 ENCOUNTER — APPOINTMENT (OUTPATIENT)
Dept: GENERAL RADIOLOGY | Facility: HOSPITAL | Age: 21
End: 2021-08-25

## 2021-08-25 ENCOUNTER — OFFICE VISIT (OUTPATIENT)
Dept: FAMILY MEDICINE CLINIC | Facility: CLINIC | Age: 21
End: 2021-08-25

## 2021-08-25 VITALS
SYSTOLIC BLOOD PRESSURE: 110 MMHG | WEIGHT: 243.4 LBS | RESPIRATION RATE: 18 BRPM | DIASTOLIC BLOOD PRESSURE: 70 MMHG | HEIGHT: 64 IN | HEART RATE: 94 BPM | OXYGEN SATURATION: 99 % | BODY MASS INDEX: 41.55 KG/M2 | TEMPERATURE: 97.2 F

## 2021-08-25 VITALS
OXYGEN SATURATION: 98 % | RESPIRATION RATE: 14 BRPM | HEIGHT: 64 IN | BODY MASS INDEX: 42.42 KG/M2 | DIASTOLIC BLOOD PRESSURE: 67 MMHG | SYSTOLIC BLOOD PRESSURE: 97 MMHG | HEART RATE: 95 BPM | WEIGHT: 248.46 LBS | TEMPERATURE: 98.5 F

## 2021-08-25 DIAGNOSIS — F41.0 PANIC ATTACK AS REACTION TO STRESS: ICD-10-CM

## 2021-08-25 DIAGNOSIS — E66.01 CLASS 3 SEVERE OBESITY DUE TO EXCESS CALORIES WITH SERIOUS COMORBIDITY AND BODY MASS INDEX (BMI) OF 40.0 TO 44.9 IN ADULT (HCC): Chronic | ICD-10-CM

## 2021-08-25 DIAGNOSIS — D64.9 ANEMIA, UNSPECIFIED TYPE: Chronic | ICD-10-CM

## 2021-08-25 DIAGNOSIS — Z20.822 COVID-19 VIRUS TEST RESULT UNKNOWN: Primary | ICD-10-CM

## 2021-08-25 DIAGNOSIS — F41.1 GAD (GENERALIZED ANXIETY DISORDER): Primary | ICD-10-CM

## 2021-08-25 DIAGNOSIS — F43.0 PANIC ATTACK AS REACTION TO STRESS: ICD-10-CM

## 2021-08-25 PROBLEM — E11.9 DIABETES (HCC): Status: RESOLVED | Noted: 2021-07-26 | Resolved: 2021-08-25

## 2021-08-25 PROBLEM — F41.9 ANXIETY: Status: RESOLVED | Noted: 2021-07-26 | Resolved: 2021-08-25

## 2021-08-25 PROBLEM — E66.813 CLASS 3 SEVERE OBESITY DUE TO EXCESS CALORIES WITH SERIOUS COMORBIDITY AND BODY MASS INDEX (BMI) OF 40.0 TO 44.9 IN ADULT: Chronic | Status: ACTIVE | Noted: 2021-07-26

## 2021-08-25 PROBLEM — I95.9 LOW BLOOD PRESSURE: Status: RESOLVED | Noted: 2021-07-26 | Resolved: 2021-08-25

## 2021-08-25 LAB
ALBUMIN SERPL-MCNC: 4 G/DL (ref 3.5–5.2)
ALBUMIN/GLOB SERPL: 1.3 G/DL
ALP SERPL-CCNC: 95 U/L (ref 39–117)
ALT SERPL W P-5'-P-CCNC: 13 U/L (ref 1–33)
ANION GAP SERPL CALCULATED.3IONS-SCNC: 11.1 MMOL/L (ref 5–15)
ANISOCYTOSIS BLD QL: NORMAL
AST SERPL-CCNC: 15 U/L (ref 1–32)
BASOPHILS # BLD AUTO: 0.07 10*3/MM3 (ref 0–0.2)
BASOPHILS NFR BLD AUTO: 0.7 % (ref 0–1.5)
BILIRUB SERPL-MCNC: 0.3 MG/DL (ref 0–1.2)
BUN SERPL-MCNC: 11 MG/DL (ref 6–20)
BUN/CREAT SERPL: 14.5 (ref 7–25)
CALCIUM SPEC-SCNC: 8.9 MG/DL (ref 8.6–10.5)
CHLORIDE SERPL-SCNC: 104 MMOL/L (ref 98–107)
CO2 SERPL-SCNC: 24.9 MMOL/L (ref 22–29)
CREAT SERPL-MCNC: 0.76 MG/DL (ref 0.57–1)
DEPRECATED RDW RBC AUTO: 41.3 FL (ref 37–54)
EOSINOPHIL # BLD AUTO: 0.16 10*3/MM3 (ref 0–0.4)
EOSINOPHIL NFR BLD AUTO: 1.6 % (ref 0.3–6.2)
ERYTHROCYTE [DISTWIDTH] IN BLOOD BY AUTOMATED COUNT: 15.9 % (ref 12.3–15.4)
FLUAV AG NPH QL: NEGATIVE
FLUBV AG NPH QL IA: NEGATIVE
GFR SERPL CREATININE-BSD FRML MDRD: 96 ML/MIN/1.73
GLOBULIN UR ELPH-MCNC: 3.2 GM/DL
GLUCOSE SERPL-MCNC: 91 MG/DL (ref 65–99)
HCT VFR BLD AUTO: 34.6 % (ref 34–46.6)
HGB BLD-MCNC: 10.6 G/DL (ref 12–15.9)
HOLD SPECIMEN: NORMAL
HOLD SPECIMEN: NORMAL
IMM GRANULOCYTES # BLD AUTO: 0.03 10*3/MM3 (ref 0–0.05)
IMM GRANULOCYTES NFR BLD AUTO: 0.3 % (ref 0–0.5)
LYMPHOCYTES # BLD AUTO: 2.94 10*3/MM3 (ref 0.7–3.1)
LYMPHOCYTES NFR BLD AUTO: 28.8 % (ref 19.6–45.3)
MCH RBC QN AUTO: 22.6 PG (ref 26.6–33)
MCHC RBC AUTO-ENTMCNC: 30.6 G/DL (ref 31.5–35.7)
MCV RBC AUTO: 73.8 FL (ref 79–97)
MICROCYTES BLD QL: NORMAL
MONOCYTES # BLD AUTO: 0.87 10*3/MM3 (ref 0.1–0.9)
MONOCYTES NFR BLD AUTO: 8.5 % (ref 5–12)
NEUTROPHILS NFR BLD AUTO: 6.14 10*3/MM3 (ref 1.7–7)
NEUTROPHILS NFR BLD AUTO: 60.1 % (ref 42.7–76)
NRBC BLD AUTO-RTO: 0 /100 WBC (ref 0–0.2)
NT-PROBNP SERPL-MCNC: 12.9 PG/ML (ref 0–450)
PLATELET # BLD AUTO: 405 10*3/MM3 (ref 140–450)
PMV BLD AUTO: 9.8 FL (ref 6–12)
POLYCHROMASIA BLD QL SMEAR: NORMAL
POTASSIUM SERPL-SCNC: 3.5 MMOL/L (ref 3.5–5.2)
PROT SERPL-MCNC: 7.2 G/DL (ref 6–8.5)
QT INTERVAL: 338 MS
RBC # BLD AUTO: 4.69 10*6/MM3 (ref 3.77–5.28)
SMALL PLATELETS BLD QL SMEAR: ADEQUATE
SODIUM SERPL-SCNC: 140 MMOL/L (ref 136–145)
TROPONIN T SERPL-MCNC: <0.01 NG/ML (ref 0–0.03)
WBC # BLD AUTO: 10.21 10*3/MM3 (ref 3.4–10.8)
WBC MORPH BLD: NORMAL
WHOLE BLOOD HOLD SPECIMEN: NORMAL
WHOLE BLOOD HOLD SPECIMEN: NORMAL

## 2021-08-25 PROCEDURE — 93005 ELECTROCARDIOGRAM TRACING: CPT | Performed by: EMERGENCY MEDICINE

## 2021-08-25 PROCEDURE — 99284 EMERGENCY DEPT VISIT MOD MDM: CPT

## 2021-08-25 PROCEDURE — 71045 X-RAY EXAM CHEST 1 VIEW: CPT

## 2021-08-25 PROCEDURE — 87804 INFLUENZA ASSAY W/OPTIC: CPT | Performed by: EMERGENCY MEDICINE

## 2021-08-25 PROCEDURE — 85007 BL SMEAR W/DIFF WBC COUNT: CPT | Performed by: EMERGENCY MEDICINE

## 2021-08-25 PROCEDURE — 84484 ASSAY OF TROPONIN QUANT: CPT | Performed by: EMERGENCY MEDICINE

## 2021-08-25 PROCEDURE — 83880 ASSAY OF NATRIURETIC PEPTIDE: CPT | Performed by: EMERGENCY MEDICINE

## 2021-08-25 PROCEDURE — C9803 HOPD COVID-19 SPEC COLLECT: HCPCS | Performed by: EMERGENCY MEDICINE

## 2021-08-25 PROCEDURE — U0003 INFECTIOUS AGENT DETECTION BY NUCLEIC ACID (DNA OR RNA); SEVERE ACUTE RESPIRATORY SYNDROME CORONAVIRUS 2 (SARS-COV-2) (CORONAVIRUS DISEASE [COVID-19]), AMPLIFIED PROBE TECHNIQUE, MAKING USE OF HIGH THROUGHPUT TECHNOLOGIES AS DESCRIBED BY CMS-2020-01-R: HCPCS | Performed by: EMERGENCY MEDICINE

## 2021-08-25 PROCEDURE — 85025 COMPLETE CBC W/AUTO DIFF WBC: CPT | Performed by: EMERGENCY MEDICINE

## 2021-08-25 PROCEDURE — 99283 EMERGENCY DEPT VISIT LOW MDM: CPT

## 2021-08-25 PROCEDURE — 99214 OFFICE O/P EST MOD 30 MIN: CPT | Performed by: FAMILY MEDICINE

## 2021-08-25 PROCEDURE — 80053 COMPREHEN METABOLIC PANEL: CPT | Performed by: EMERGENCY MEDICINE

## 2021-08-25 RX ORDER — SODIUM CHLORIDE 0.9 % (FLUSH) 0.9 %
10 SYRINGE (ML) INJECTION AS NEEDED
Status: DISCONTINUED | OUTPATIENT
Start: 2021-08-25 | End: 2021-08-25 | Stop reason: HOSPADM

## 2021-08-25 RX ORDER — FERROUS SULFATE 325(65) MG
325 TABLET ORAL EVERY OTHER DAY
Qty: 45 TABLET | Refills: 1 | Status: SHIPPED | OUTPATIENT
Start: 2021-08-25 | End: 2021-11-23

## 2021-08-25 NOTE — ASSESSMENT & PLAN NOTE
The patient admits since she has been moving recently she has not been taking her Cymbalta 30 mg daily.  She was educated about the need to take that medicine on a daily basis.  We will let her get started back on it and bring her back in a few weeks and at that time reevaluate her anxiety and panic.

## 2021-08-25 NOTE — PROGRESS NOTES
"Chief Complaint  Anxiety, pannic attacks, Nausea, Headache, Chills, and Hospital Follow Up Visit    Subjective          Jayda Sharif presents to [facility]  She is here today for an ER follow-up.  She has past medical history significant for type 2 diabetes, anxiety, migraine headache, morbid obesity and iron deficiency anemia.    The patient says it earlier today she was on the telephone when she found out that one of her family members has COVID-19.  At that time she said she started having tightness in her chest and started to have bouts of lower extremity weakness.  Her symptoms worsened and she called 911.  Patient was taken to the emergency room were EKG, labs and chest x-ray were normal.  She was diagnosed with panic attack and discharged to follow-up with her family doctor.    The patient has no other complaints today and denies chest pain, shortness of breath, weakness, numbness, nausea, vomiting, diarrhea, dizziness or syncopal event.        Objective   Vital Signs:   /70 (BP Location: Left arm, Patient Position: Sitting)   Pulse 94   Temp 97.2 °F (36.2 °C)   Resp 18   Ht 162.6 cm (64\")   Wt 110 kg (243 lb 6.4 oz)   SpO2 99%   BMI 41.78 kg/m²     Physical Exam   Result Review :     CMP    CMP 4/30/21 5/30/21 8/25/21   Glucose   91   Glucose 89 101 (A)    BUN 9 12 11   Creatinine 0.68 0.77 0.76   eGFR Non African Am   96   Sodium 136 136 140   Potassium 3.7 3.9 3.5   Chloride 102 101 104   Calcium 8.6 (A) 8.7 8.9   Albumin 4.0  4.00   Total Bilirubin 0.58  0.3   Alkaline Phosphatase 108 (A)  95   AST (SGOT) 26  15   ALT (SGPT) 22  13   (A) Abnormal value       Comments are available for some flowsheets but are not being displayed.           CBC    CBC 5/30/21 7/26/21 8/25/21   WBC 10.19 7.82 10.21   RBC 4.97 5.28 4.69   Hemoglobin 11.1 (A) 11.9 (A) 10.6 (A)   Hematocrit 36.0 (A) 38.2 34.6   MCV 72.4 (A) 72.3 (A) 73.8 (A)   MCH 22.3 (A) 22.5 (A) 22.6 (A) "   MCHC 30.8 (A) 31.2 (A) 30.6 (A)   RDW 16.4 (A) 15.2 15.9 (A)   Platelets 458 (A) 511 (A) 405   (A) Abnormal value                              Assessment and Plan    Diagnoses and all orders for this visit:    1. GEMMA (generalized anxiety disorder) (Primary)  Assessment & Plan:  The patient admits since she has been moving recently she has not been taking her Cymbalta 30 mg daily.  She was educated about the need to take that medicine on a daily basis.  We will let her get started back on it and bring her back in a few weeks and at that time reevaluate her anxiety and panic.      2. Class 3 severe obesity due to excess calories with serious comorbidity and body mass index (BMI) of 40.0 to 44.9 in adult (CMS/[facility])  Assessment & Plan:  Patient's (Body mass index is 41.78 kg/m².) indicates that they are morbidly obese (BMI > 40 or > 35 with obesity - related health condition) with health conditions that include none . Weight is improving with lifestyle modifications. BMI is is above average; BMI management plan is completed. We discussed low calorie, low carb based diet program, portion control and increasing exercise.       3. Anemia, unspecified type  Assessment & Plan:  The patient recently had iron levels as well as CBC drawn.  She was found to be mildly anemic and her iron levels very low.  She was started on iron replacement and it will be rechecked in 3 months and managed according to findings.      Other orders  -     ferrous sulfate (FerrouSul) 325 (65 FE) MG tablet; Take 1 tablet by mouth Every Other Day for 90 days. Take every other day  Dispense: 45 tablet; Refill: 1      Follow Up   Return if symptoms worsen or fail to improve.  Patient was given instructions and counseling regarding her condition or for health maintenance advice. Please see specific information pulled into the AVS if appropriate.       Answers for HPI/ROS submitted by the patient on 8/24/2021  Please describe your symptoms.: Nausea,  cramping, migraine, congested  Have you had these symptoms before?: Yes  How long have you been having these symptoms?: 1-4 days  Please list any medications you are currently taking for this condition.: Tramadol, Tylenol, Zofran  What is the primary reason for your visit?: Other

## 2021-08-25 NOTE — ASSESSMENT & PLAN NOTE
The patient recently had iron levels as well as CBC drawn.  She was found to be mildly anemic and her iron levels very low.  She was started on iron replacement and it will be rechecked in 3 months and managed according to findings.

## 2021-08-25 NOTE — ASSESSMENT & PLAN NOTE
Patient's (Body mass index is 41.78 kg/m².) indicates that they are morbidly obese (BMI > 40 or > 35 with obesity - related health condition) with health conditions that include none . Weight is improving with lifestyle modifications. BMI is is above average; BMI management plan is completed. We discussed low calorie, low carb based diet program, portion control and increasing exercise.

## 2021-08-26 ENCOUNTER — TELEPHONE (OUTPATIENT)
Dept: FAMILY MEDICINE CLINIC | Facility: CLINIC | Age: 21
End: 2021-08-26

## 2021-08-26 NOTE — TELEPHONE ENCOUNTER
Caller: Jayda Sharif    Relationship: Self    Best call back number: [phone]    Caller requesting test results: YES    What test was performed: COVID    When was the test performed: Wednesday     Where was the test performed: HOSPITAL     Additional notes:

## 2021-08-27 LAB — SARS-COV-2 RNA RESP QL NAA+PROBE: NOT DETECTED

## 2021-09-08 ENCOUNTER — OFFICE VISIT (OUTPATIENT)
Dept: FAMILY MEDICINE CLINIC | Facility: CLINIC | Age: 21
End: 2021-09-08

## 2021-09-08 VITALS
RESPIRATION RATE: 18 BRPM | HEIGHT: 64 IN | SYSTOLIC BLOOD PRESSURE: 122 MMHG | HEART RATE: 108 BPM | BODY MASS INDEX: 41.07 KG/M2 | WEIGHT: 240.6 LBS | OXYGEN SATURATION: 98 % | DIASTOLIC BLOOD PRESSURE: 69 MMHG | TEMPERATURE: 97.3 F

## 2021-09-08 DIAGNOSIS — E66.01 CLASS 3 SEVERE OBESITY DUE TO EXCESS CALORIES WITH SERIOUS COMORBIDITY AND BODY MASS INDEX (BMI) OF 40.0 TO 44.9 IN ADULT (HCC): Chronic | ICD-10-CM

## 2021-09-08 DIAGNOSIS — F33.1 MODERATE EPISODE OF RECURRENT MAJOR DEPRESSIVE DISORDER (HCC): Chronic | ICD-10-CM

## 2021-09-08 DIAGNOSIS — D64.9 ANEMIA, UNSPECIFIED TYPE: Chronic | ICD-10-CM

## 2021-09-08 DIAGNOSIS — F41.1 GAD (GENERALIZED ANXIETY DISORDER): Primary | Chronic | ICD-10-CM

## 2021-09-08 PROBLEM — F32.A DEPRESSION: Chronic | Status: ACTIVE | Noted: 2021-07-26

## 2021-09-08 PROBLEM — G43.909 MIGRAINE HEADACHE: Chronic | Status: ACTIVE | Noted: 2021-07-26

## 2021-09-08 PROCEDURE — 99214 OFFICE O/P EST MOD 30 MIN: CPT | Performed by: FAMILY MEDICINE

## 2021-09-08 RX ORDER — BUSPIRONE HYDROCHLORIDE 7.5 MG/1
7.5 TABLET ORAL 3 TIMES DAILY
Qty: 90 TABLET | Refills: 2 | Status: SHIPPED | OUTPATIENT
Start: 2021-09-08 | End: 2021-10-04 | Stop reason: SDUPTHER

## 2021-09-08 RX ORDER — TRAZODONE HYDROCHLORIDE 50 MG/1
50 TABLET ORAL NIGHTLY
Qty: 30 TABLET | Refills: 2 | Status: SHIPPED | OUTPATIENT
Start: 2021-09-08 | End: 2021-10-04 | Stop reason: SDUPTHER

## 2021-09-08 RX ORDER — DULOXETIN HYDROCHLORIDE 30 MG/1
30 CAPSULE, DELAYED RELEASE ORAL DAILY
Qty: 30 CAPSULE | Refills: 5 | Status: SHIPPED | OUTPATIENT
Start: 2021-09-08 | End: 2021-10-04 | Stop reason: SDUPTHER

## 2021-09-08 NOTE — ASSESSMENT & PLAN NOTE
Patient's (Body mass index is 41.3 kg/m².) indicates that they are morbidly obese (BMI > 40 or > 35 with obesity - related health condition) with health conditions that include none . Weight is improving with lifestyle modifications. BMI is is above average; BMI management plan is completed. We discussed low calorie, low carb based diet program, portion control and increasing exercise.

## 2021-09-08 NOTE — ASSESSMENT & PLAN NOTE
Patient's depression is recurrent and is moderate without psychosis. Their depression is currently active and the condition is improving with treatment. This will be reassessed 1 month. F/U as described:patient will continue current medication therapy and patient depression is being managed by their pcp.

## 2021-09-08 NOTE — PROGRESS NOTES
"Answers for HPI/ROS submitted by the patient on 9/1/2021  Please describe your symptoms.: Appointment : Check up with Dr Jhaveri about my anxiety and panic attacks.  Have you had these symptoms before?: Yes  How long have you been having these symptoms?: Greater than 2 weeks  Please list any medications you are currently taking for this condition.: Duloxetine HCL DR 30 MG CAP  What is the primary reason for your visit?: Other    Chief Complaint  Insomnia and Anxiety    Subjective          Jayda Sharif presents to [facility]  She is here today for a follow-up for her chronic medical conditions.  She has a past medical history significant for type 2 diabetes, anxiety, migraine headache, morbid obesity and iron deficiency anemia.    The patient says she has been tolerating the Cymbalta 30 mg daily since her last visit.  She still having some breakthrough anxiety at today's visit.     The patient has no other complaints today and denies chest pain, shortness of breath, weakness, numbness, nausea, vomiting, diarrhea, dizziness or syncopal event.        Objective   Vital Signs:   /69 (BP Location: Left arm, Patient Position: Sitting)   Pulse 108   Temp 97.3 °F (36.3 °C)   Resp 18   Ht 162.6 cm (64\")   Wt 109 kg (240 lb 9.6 oz)   SpO2 98%   BMI 41.30 kg/m²     Physical Exam  Vitals reviewed.   Constitutional:       Appearance: Normal appearance. She is well-developed. She is obese.   HENT:      Head: Normocephalic and atraumatic.      Right Ear: External ear normal.      Left Ear: External ear normal.      Mouth/Throat:      Pharynx: No oropharyngeal exudate.   Eyes:      Conjunctiva/sclera: Conjunctivae normal.      Pupils: Pupils are equal, round, and reactive to light.   Neck:      Vascular: No carotid bruit.   Cardiovascular:      Rate and Rhythm: Normal rate and regular rhythm.      Heart sounds: No murmur heard.   No friction rub. No gallop.    Pulmonary:      " Effort: Pulmonary effort is normal.      Breath sounds: Normal breath sounds. No wheezing or rhonchi.   Abdominal:      General: Bowel sounds are normal. There is no distension.      Palpations: Abdomen is soft.      Tenderness: There is no abdominal tenderness.   Skin:     General: Skin is warm and dry.   Neurological:      Mental Status: She is alert and oriented to person, place, and time.      Cranial Nerves: No cranial nerve deficit.      Motor: No weakness.   Psychiatric:         Mood and Affect: Mood and affect normal.         Behavior: Behavior normal.         Thought Content: Thought content normal.         Judgment: Judgment normal.        Result Review :     CMP    CMP 4/30/21 5/30/21 8/25/21   Glucose   91   Glucose 89 101 (A)    BUN 9 12 11   Creatinine 0.68 0.77 0.76   eGFR Non African Am   96   Sodium 136 136 140   Potassium 3.7 3.9 3.5   Chloride 102 101 104   Calcium 8.6 (A) 8.7 8.9   Albumin 4.0  4.00   Total Bilirubin 0.58  0.3   Alkaline Phosphatase 108 (A)  95   AST (SGOT) 26  15   ALT (SGPT) 22  13   (A) Abnormal value       Comments are available for some flowsheets but are not being displayed.           CBC    CBC 5/30/21 7/26/21 8/25/21   WBC 10.19 7.82 10.21   RBC 4.97 5.28 4.69   Hemoglobin 11.1 (A) 11.9 (A) 10.6 (A)   Hematocrit 36.0 (A) 38.2 34.6   MCV 72.4 (A) 72.3 (A) 73.8 (A)   MCH 22.3 (A) 22.5 (A) 22.6 (A)   MCHC 30.8 (A) 31.2 (A) 30.6 (A)   RDW 16.4 (A) 15.2 15.9 (A)   Platelets 458 (A) 511 (A) 405   (A) Abnormal value                              Assessment and Plan    Diagnoses and all orders for this visit:    1. GEMMA (generalized anxiety disorder) (Primary)  Assessment & Plan:  Psychological condition is unchanged.  Medication changes per orders.  Psychological condition  will be reassessed 6 weeks.      2. Class 3 severe obesity due to excess calories with serious comorbidity and body mass index (BMI) of 40.0 to 44.9 in adult (CMS/[facility])  Assessment & Plan:  Patient's (Body mass  index is 41.3 kg/m².) indicates that they are morbidly obese (BMI > 40 or > 35 with obesity - related health condition) with health conditions that include none . Weight is improving with lifestyle modifications. BMI is is above average; BMI management plan is completed. We discussed low calorie, low carb based diet program, portion control and increasing exercise.       3. Moderate episode of recurrent major depressive disorder (CMS/HCC)  Assessment & Plan:  Patient's depression is recurrent and is moderate without psychosis. Their depression is currently active and the condition is improving with treatment. This will be reassessed 1 month. F/U as described:patient will continue current medication therapy and patient depression is being managed by their pcp.      4. Anemia, unspecified type  Assessment & Plan:  The patient has iron deficiency anemia.  She is currently on iron replacement daily.  Her iron level will be checked in a few weeks and manage according to findings.      Other orders  -     busPIRone (BUSPAR) 7.5 MG tablet; Take 1 tablet by mouth 3 (Three) Times a Day.  Dispense: 90 tablet; Refill: 2  -     DULoxetine (CYMBALTA) 30 MG capsule; Take 1 capsule by mouth Daily.  Dispense: 30 capsule; Refill: 5  -     traZODone (DESYREL) 50 MG tablet; Take 1 tablet by mouth Every Night.  Dispense: 30 tablet; Refill: 2      Follow Up   Return in about 1 month (around 10/8/2021).  Patient was given instructions and counseling regarding her condition or for health maintenance advice. Please see specific information pulled into the AVS if appropriate.

## 2021-09-08 NOTE — ASSESSMENT & PLAN NOTE
The patient has iron deficiency anemia.  She is currently on iron replacement daily.  Her iron level will be checked in a few weeks and manage according to findings.

## 2021-10-04 RX ORDER — BUSPIRONE HYDROCHLORIDE 7.5 MG/1
7.5 TABLET ORAL 3 TIMES DAILY
Qty: 90 TABLET | Refills: 1 | Status: SHIPPED | OUTPATIENT
Start: 2021-10-04 | End: 2021-12-02 | Stop reason: SDUPTHER

## 2021-10-04 RX ORDER — TRAZODONE HYDROCHLORIDE 50 MG/1
50 TABLET ORAL NIGHTLY
Qty: 30 TABLET | Refills: 2 | Status: SHIPPED | OUTPATIENT
Start: 2021-10-04 | End: 2022-02-12 | Stop reason: HOSPADM

## 2021-10-04 RX ORDER — DULOXETIN HYDROCHLORIDE 30 MG/1
30 CAPSULE, DELAYED RELEASE ORAL DAILY
Qty: 30 CAPSULE | Refills: 5 | Status: SHIPPED | OUTPATIENT
Start: 2021-10-04 | End: 2021-12-02 | Stop reason: SDUPTHER

## 2021-10-21 ENCOUNTER — OFFICE VISIT (OUTPATIENT)
Dept: FAMILY MEDICINE CLINIC | Facility: CLINIC | Age: 21
End: 2021-10-21

## 2021-10-21 ENCOUNTER — APPOINTMENT (OUTPATIENT)
Dept: GENERAL RADIOLOGY | Facility: HOSPITAL | Age: 21
End: 2021-10-21

## 2021-10-21 ENCOUNTER — APPOINTMENT (OUTPATIENT)
Dept: CT IMAGING | Facility: HOSPITAL | Age: 21
End: 2021-10-21

## 2021-10-21 ENCOUNTER — HOSPITAL ENCOUNTER (EMERGENCY)
Facility: HOSPITAL | Age: 21
Discharge: HOME | End: 2021-10-21
Attending: EMERGENCY MEDICINE | Admitting: EMERGENCY MEDICINE

## 2021-10-21 VITALS
HEART RATE: 100 BPM | DIASTOLIC BLOOD PRESSURE: 76 MMHG | RESPIRATION RATE: 18 BRPM | SYSTOLIC BLOOD PRESSURE: 105 MMHG | TEMPERATURE: 97.1 F | BODY MASS INDEX: 41.48 KG/M2 | OXYGEN SATURATION: 98 % | WEIGHT: 243 LBS | HEIGHT: 64 IN

## 2021-10-21 VITALS
OXYGEN SATURATION: 100 % | DIASTOLIC BLOOD PRESSURE: 70 MMHG | TEMPERATURE: 98 F | HEIGHT: 64 IN | WEIGHT: 243 LBS | BODY MASS INDEX: 41.48 KG/M2 | SYSTOLIC BLOOD PRESSURE: 124 MMHG | RESPIRATION RATE: 16 BRPM | HEART RATE: 75 BPM

## 2021-10-21 DIAGNOSIS — R10.32 LEFT LOWER QUADRANT ABDOMINAL PAIN: Primary | ICD-10-CM

## 2021-10-21 DIAGNOSIS — E66.01 CLASS 3 SEVERE OBESITY DUE TO EXCESS CALORIES WITH SERIOUS COMORBIDITY AND BODY MASS INDEX (BMI) OF 40.0 TO 44.9 IN ADULT (HCC): Chronic | ICD-10-CM

## 2021-10-21 DIAGNOSIS — F41.1 GAD (GENERALIZED ANXIETY DISORDER): Chronic | ICD-10-CM

## 2021-10-21 LAB
ALBUMIN SERPL-MCNC: 3.9 G/DL (ref 3.5–5.2)
ALBUMIN/GLOB SERPL: 1.1 G/DL
ALP SERPL-CCNC: 92 U/L (ref 39–117)
ALT SERPL W P-5'-P-CCNC: 18 U/L (ref 1–33)
ANION GAP SERPL CALCULATED.3IONS-SCNC: 12.1 MMOL/L (ref 5–15)
AST SERPL-CCNC: 21 U/L (ref 1–32)
BACTERIA UR QL AUTO: ABNORMAL /HPF
BASOPHILS # BLD AUTO: 0.08 10*3/MM3 (ref 0–0.2)
BASOPHILS NFR BLD AUTO: 0.9 % (ref 0–1.5)
BILIRUB SERPL-MCNC: 0.3 MG/DL (ref 0–1.2)
BILIRUB UR QL STRIP: NEGATIVE
BUN SERPL-MCNC: 9 MG/DL (ref 6–20)
BUN/CREAT SERPL: 14.8 (ref 7–25)
C TRACH RRNA CVX QL NAA+PROBE: NOT DETECTED
CALCIUM SPEC-SCNC: 8.5 MG/DL (ref 8.6–10.5)
CANDIDA SPECIES: NEGATIVE
CHLORIDE SERPL-SCNC: 103 MMOL/L (ref 98–107)
CLARITY UR: CLEAR
CO2 SERPL-SCNC: 23.9 MMOL/L (ref 22–29)
COLOR UR: YELLOW
CREAT SERPL-MCNC: 0.61 MG/DL (ref 0.57–1)
DEPRECATED RDW RBC AUTO: 44.1 FL (ref 37–54)
EOSINOPHIL # BLD AUTO: 0.2 10*3/MM3 (ref 0–0.4)
EOSINOPHIL NFR BLD AUTO: 2.3 % (ref 0.3–6.2)
ERYTHROCYTE [DISTWIDTH] IN BLOOD BY AUTOMATED COUNT: 15.7 % (ref 12.3–15.4)
GARDNERELLA VAGINALIS: NEGATIVE
GFR SERPL CREATININE-BSD FRML MDRD: 124 ML/MIN/1.73
GIANT PLATELETS: NORMAL
GLOBULIN UR ELPH-MCNC: 3.5 GM/DL
GLUCOSE SERPL-MCNC: 93 MG/DL (ref 65–99)
GLUCOSE UR STRIP-MCNC: NEGATIVE MG/DL
HCG INTACT+B SERPL-ACNC: <0.5 MIU/ML
HCT VFR BLD AUTO: 39.8 % (ref 34–46.6)
HGB BLD-MCNC: 12.3 G/DL (ref 12–15.9)
HGB UR QL STRIP.AUTO: ABNORMAL
HOLD SPECIMEN: NORMAL
HOLD SPECIMEN: NORMAL
HYALINE CASTS UR QL AUTO: ABNORMAL /LPF
IMM GRANULOCYTES # BLD AUTO: 0.03 10*3/MM3 (ref 0–0.05)
IMM GRANULOCYTES NFR BLD AUTO: 0.3 % (ref 0–0.5)
KETONES UR QL STRIP: NEGATIVE
LARGE PLATELETS: NORMAL
LEUKOCYTE ESTERASE UR QL STRIP.AUTO: NEGATIVE
LIPASE SERPL-CCNC: 36 U/L (ref 13–60)
LYMPHOCYTES # BLD AUTO: 2.63 10*3/MM3 (ref 0.7–3.1)
LYMPHOCYTES NFR BLD AUTO: 30.1 % (ref 19.6–45.3)
MCH RBC QN AUTO: 23.9 PG (ref 26.6–33)
MCHC RBC AUTO-ENTMCNC: 30.9 G/DL (ref 31.5–35.7)
MCV RBC AUTO: 77.4 FL (ref 79–97)
MICROCYTES BLD QL: NORMAL
MONOCYTES # BLD AUTO: 0.57 10*3/MM3 (ref 0.1–0.9)
MONOCYTES NFR BLD AUTO: 6.5 % (ref 5–12)
N GONORRHOEA RRNA SPEC QL NAA+PROBE: NOT DETECTED
NEUTROPHILS NFR BLD AUTO: 5.22 10*3/MM3 (ref 1.7–7)
NEUTROPHILS NFR BLD AUTO: 59.9 % (ref 42.7–76)
NITRITE UR QL STRIP: NEGATIVE
NRBC BLD AUTO-RTO: 0 /100 WBC (ref 0–0.2)
OVALOCYTES BLD QL SMEAR: NORMAL
PH UR STRIP.AUTO: 6 [PH] (ref 5–8)
PLATELET # BLD AUTO: 369 10*3/MM3 (ref 140–450)
PMV BLD AUTO: 9.4 FL (ref 6–12)
POIKILOCYTOSIS BLD QL SMEAR: NORMAL
POTASSIUM SERPL-SCNC: 4.1 MMOL/L (ref 3.5–5.2)
PROT SERPL-MCNC: 7.4 G/DL (ref 6–8.5)
PROT UR QL STRIP: NEGATIVE
RBC # BLD AUTO: 5.14 10*6/MM3 (ref 3.77–5.28)
RBC # UR: ABNORMAL /HPF
REF LAB TEST METHOD: ABNORMAL
SMALL PLATELETS BLD QL SMEAR: ADEQUATE
SODIUM SERPL-SCNC: 139 MMOL/L (ref 136–145)
SP GR UR STRIP: 1.01 (ref 1–1.03)
SQUAMOUS #/AREA URNS HPF: ABNORMAL /HPF
STOMATOCYTES BLD QL SMEAR: NORMAL
T VAGINALIS DNA VAG QL PROBE+SIG AMP: NEGATIVE
UROBILINOGEN UR QL STRIP: ABNORMAL
WBC # BLD AUTO: 8.73 10*3/MM3 (ref 3.4–10.8)
WBC MORPH BLD: NORMAL
WBC UR QL AUTO: ABNORMAL /HPF
WHOLE BLOOD HOLD SPECIMEN: NORMAL

## 2021-10-21 PROCEDURE — 87510 GARDNER VAG DNA DIR PROBE: CPT | Performed by: NURSE PRACTITIONER

## 2021-10-21 PROCEDURE — 83690 ASSAY OF LIPASE: CPT | Performed by: EMERGENCY MEDICINE

## 2021-10-21 PROCEDURE — 85025 COMPLETE CBC W/AUTO DIFF WBC: CPT | Performed by: EMERGENCY MEDICINE

## 2021-10-21 PROCEDURE — 81001 URINALYSIS AUTO W/SCOPE: CPT | Performed by: EMERGENCY MEDICINE

## 2021-10-21 PROCEDURE — 85007 BL SMEAR W/DIFF WBC COUNT: CPT | Performed by: EMERGENCY MEDICINE

## 2021-10-21 PROCEDURE — 25010000002 DROPERIDOL PER 5 MG: Performed by: EMERGENCY MEDICINE

## 2021-10-21 PROCEDURE — 87491 CHLMYD TRACH DNA AMP PROBE: CPT | Performed by: NURSE PRACTITIONER

## 2021-10-21 PROCEDURE — 0 IOPAMIDOL PER 1 ML: Performed by: EMERGENCY MEDICINE

## 2021-10-21 PROCEDURE — 80053 COMPREHEN METABOLIC PANEL: CPT | Performed by: EMERGENCY MEDICINE

## 2021-10-21 PROCEDURE — 25010000002 DIPHENHYDRAMINE PER 50 MG: Performed by: EMERGENCY MEDICINE

## 2021-10-21 PROCEDURE — 99283 EMERGENCY DEPT VISIT LOW MDM: CPT

## 2021-10-21 PROCEDURE — 25010000002 KETOROLAC TROMETHAMINE PER 15 MG: Performed by: NURSE PRACTITIONER

## 2021-10-21 PROCEDURE — 74019 RADEX ABDOMEN 2 VIEWS: CPT

## 2021-10-21 PROCEDURE — 96374 THER/PROPH/DIAG INJ IV PUSH: CPT

## 2021-10-21 PROCEDURE — 84702 CHORIONIC GONADOTROPIN TEST: CPT | Performed by: EMERGENCY MEDICINE

## 2021-10-21 PROCEDURE — 87591 N.GONORRHOEAE DNA AMP PROB: CPT | Performed by: NURSE PRACTITIONER

## 2021-10-21 PROCEDURE — 96376 TX/PRO/DX INJ SAME DRUG ADON: CPT

## 2021-10-21 PROCEDURE — 87660 TRICHOMONAS VAGIN DIR PROBE: CPT | Performed by: NURSE PRACTITIONER

## 2021-10-21 PROCEDURE — 99214 OFFICE O/P EST MOD 30 MIN: CPT | Performed by: FAMILY MEDICINE

## 2021-10-21 PROCEDURE — 87480 CANDIDA DNA DIR PROBE: CPT | Performed by: NURSE PRACTITIONER

## 2021-10-21 PROCEDURE — 74177 CT ABD & PELVIS W/CONTRAST: CPT

## 2021-10-21 PROCEDURE — 25010000002 MORPHINE PER 10 MG: Performed by: EMERGENCY MEDICINE

## 2021-10-21 PROCEDURE — 96375 TX/PRO/DX INJ NEW DRUG ADDON: CPT

## 2021-10-21 PROCEDURE — 25010000002 ONDANSETRON PER 1 MG: Performed by: EMERGENCY MEDICINE

## 2021-10-21 RX ORDER — DIPHENHYDRAMINE HYDROCHLORIDE 50 MG/ML
25 INJECTION INTRAMUSCULAR; INTRAVENOUS ONCE
Status: COMPLETED | OUTPATIENT
Start: 2021-10-21 | End: 2021-10-21

## 2021-10-21 RX ORDER — SODIUM CHLORIDE 0.9 % (FLUSH) 0.9 %
10 SYRINGE (ML) INJECTION AS NEEDED
Status: DISCONTINUED | OUTPATIENT
Start: 2021-10-21 | End: 2021-10-21 | Stop reason: HOSPADM

## 2021-10-21 RX ORDER — ONDANSETRON 2 MG/ML
4 INJECTION INTRAMUSCULAR; INTRAVENOUS ONCE
Status: COMPLETED | OUTPATIENT
Start: 2021-10-21 | End: 2021-10-21

## 2021-10-21 RX ORDER — ONDANSETRON 4 MG/1
4 TABLET, ORALLY DISINTEGRATING ORAL EVERY 6 HOURS PRN
Qty: 15 TABLET | Refills: 0 | Status: ON HOLD | OUTPATIENT
Start: 2021-10-21 | End: 2022-02-11

## 2021-10-21 RX ORDER — LIDOCAINE HYDROCHLORIDE 20 MG/ML
15 SOLUTION OROPHARYNGEAL ONCE
Status: COMPLETED | OUTPATIENT
Start: 2021-10-21 | End: 2021-10-21

## 2021-10-21 RX ORDER — KETOROLAC TROMETHAMINE 30 MG/ML
30 INJECTION, SOLUTION INTRAMUSCULAR; INTRAVENOUS ONCE
Status: COMPLETED | OUTPATIENT
Start: 2021-10-21 | End: 2021-10-21

## 2021-10-21 RX ORDER — DROPERIDOL 2.5 MG/ML
2.5 INJECTION, SOLUTION INTRAMUSCULAR; INTRAVENOUS ONCE
Status: COMPLETED | OUTPATIENT
Start: 2021-10-21 | End: 2021-10-21

## 2021-10-21 RX ORDER — ALUMINA, MAGNESIA, AND SIMETHICONE 2400; 2400; 240 MG/30ML; MG/30ML; MG/30ML
15 SUSPENSION ORAL ONCE
Status: COMPLETED | OUTPATIENT
Start: 2021-10-21 | End: 2021-10-21

## 2021-10-21 RX ORDER — KETOROLAC TROMETHAMINE 10 MG/1
10 TABLET, FILM COATED ORAL EVERY 6 HOURS PRN
Qty: 20 TABLET | Refills: 0 | Status: ON HOLD | OUTPATIENT
Start: 2021-10-21 | End: 2022-02-11

## 2021-10-21 RX ADMIN — SODIUM CHLORIDE 1000 ML: 9 INJECTION, SOLUTION INTRAVENOUS at 15:52

## 2021-10-21 RX ADMIN — ALUMINUM HYDROXIDE, MAGNESIUM HYDROXIDE, AND DIMETHICONE 15 ML: 400; 400; 40 SUSPENSION ORAL at 17:20

## 2021-10-21 RX ADMIN — DIPHENHYDRAMINE HYDROCHLORIDE 25 MG: 50 INJECTION, SOLUTION INTRAMUSCULAR; INTRAVENOUS at 17:20

## 2021-10-21 RX ADMIN — KETOROLAC TROMETHAMINE 30 MG: 30 INJECTION, SOLUTION INTRAMUSCULAR; INTRAVENOUS at 16:23

## 2021-10-21 RX ADMIN — DROPERIDOL 2.5 MG: 2.5 INJECTION, SOLUTION INTRAMUSCULAR; INTRAVENOUS at 17:20

## 2021-10-21 RX ADMIN — MORPHINE SULFATE 4 MG: 4 INJECTION INTRAVENOUS at 17:20

## 2021-10-21 RX ADMIN — IOPAMIDOL 100 ML: 755 INJECTION, SOLUTION INTRAVENOUS at 17:52

## 2021-10-21 RX ADMIN — MORPHINE SULFATE 4 MG: 4 INJECTION INTRAVENOUS at 15:52

## 2021-10-21 RX ADMIN — LIDOCAINE HYDROCHLORIDE 15 ML: 20 SOLUTION ORAL; TOPICAL at 17:20

## 2021-10-21 RX ADMIN — ONDANSETRON 4 MG: 2 INJECTION INTRAMUSCULAR; INTRAVENOUS at 15:52

## 2021-10-21 NOTE — PROGRESS NOTES
"Chief Complaint  Abdominal Pain (lower,sharp ), Vaginal Bleeding, pain under left breast, and blood work    Subjective          Jayda Sharif presents to [facility]  She is here today for an acute visit.  She has a past medical history significant for type 2 diabetes, anxiety, migraine headache, morbid obesity and iron deficiency anemia.    She is having a sharp pain in her left lower abdomen that radiates up to her left breast. She says that it has been present for the past two days. She says that the pain is severe at times. She says that the pain is a 4/10 at the moment. She says that sleeping makes the pain better. She says that movement makes the pain worse. She denies having pain like this in the past. She vanesa fever, chills, nausea,vomiting, diarrhea or dysuria. She was having spotting but stopped two days ago. She said that it lasted three days. She denies spotting in the past.      The patient has no other complaints today and denies chest pain, shortness of breath, weakness, numbness, dizziness or syncopal event.         Objective   Vital Signs:   /76 (BP Location: Left arm, Patient Position: Sitting)   Pulse 100   Temp 97.1 °F (36.2 °C)   Resp 18   Ht 162.6 cm (64.02\")   Wt 110 kg (243 lb)   SpO2 98%   BMI 41.69 kg/m²     Physical Exam  Vitals reviewed.   Constitutional:       Appearance: Normal appearance. She is well-developed. She is obese.   HENT:      Head: Normocephalic and atraumatic.      Right Ear: External ear normal.      Left Ear: External ear normal.      Mouth/Throat:      Pharynx: No oropharyngeal exudate.   Eyes:      Conjunctiva/sclera: Conjunctivae normal.      Pupils: Pupils are equal, round, and reactive to light.   Neck:      Vascular: No carotid bruit.   Cardiovascular:      Rate and Rhythm: Normal rate and regular rhythm.      Heart sounds: No murmur heard.  No friction rub. No gallop.    Pulmonary:      Effort: Pulmonary effort " is normal.      Breath sounds: Normal breath sounds. No wheezing or rhonchi.   Abdominal:      General: Bowel sounds are normal. There is no distension.      Tenderness: There is abdominal tenderness.          Comments: Tenderness to palpation   Skin:     General: Skin is warm and dry.   Neurological:      Mental Status: She is alert and oriented to person, place, and time.      Cranial Nerves: No cranial nerve deficit.      Motor: No weakness.   Psychiatric:         Mood and Affect: Mood and affect normal.         Behavior: Behavior normal.         Thought Content: Thought content normal.         Judgment: Judgment normal.        Result Review :     CMP    CMP 5/30/21 8/25/21 10/21/21   Glucose  91 93   Glucose 101 (A)     BUN 12 11 9   Creatinine 0.77 0.76 0.61   eGFR Non African Am  96 124   Sodium 136 140 139   Potassium 3.9 3.5 4.1   Chloride 101 104 103   Calcium 8.7 8.9 8.5 (A)   Albumin  4.00 3.90   Total Bilirubin  0.3 0.3   Alkaline Phosphatase  95 92   AST (SGOT)  15 21   ALT (SGPT)  13 18   (A) Abnormal value       Comments are available for some flowsheets but are not being displayed.           CBC    CBC 7/26/21 8/25/21 10/21/21   WBC 7.82 10.21 8.73   RBC 5.28 4.69 5.14   Hemoglobin 11.9 (A) 10.6 (A) 12.3   Hematocrit 38.2 34.6 39.8   MCV 72.3 (A) 73.8 (A) 77.4 (A)   MCH 22.5 (A) 22.6 (A) 23.9 (A)   MCHC 31.2 (A) 30.6 (A) 30.9 (A)   RDW 15.2 15.9 (A) 15.7 (A)   Platelets 511 (A) 405 369   (A) Abnormal value                              Assessment and Plan    Diagnoses and all orders for this visit:    1. Left lower quadrant abdominal pain (Primary)  Comments:  The patient was orderd a CT of her abdomen in the clinic. Her pain worsened to the point we called an ambulance and she was taken to the ER.   Orders:  -     Cancel: hCG, Quantitative, Pregnancy; Future  -     Cancel: POCT pregnancy, urine  -     Cancel: CBC w AUTO Differential; Future  -     Cancel: Comprehensive metabolic panel; Future  -      Cancel: Sedimentation rate, automated; Future  -     Cancel: Lipase; Future    2. Class 3 severe obesity due to excess calories with serious comorbidity and body mass index (BMI) of 40.0 to 44.9 in adult (HCC)  Assessment & Plan:  Patient's (Body mass index is 41.69 kg/m².) indicates that they are morbidly obese (BMI > 40 or > 35 with obesity - related health condition) with health conditions that include none . Weight is unchanged. BMI is is above average; BMI management plan is completed. We discussed low calorie, low carb based diet program, portion control and increasing exercise.       3. GEMMA (generalized anxiety disorder)  Assessment & Plan:  Psychological condition is unchanged.  Continue current treatment regimen.  Psychological condition  will be reassessed at the next regular appointment.    The patient's anxiety is not well controlled. It may have played a factor in her ER trip today. We will readdress her anxiety at her next visit. She may need a referral to therapy, psychiatry or both.         Follow Up   No follow-ups on file.  Patient was given instructions and counseling regarding her condition or for health maintenance advice. Please see specific information pulled into the AVS if appropriate.

## 2021-10-21 NOTE — DISCHARGE INSTRUCTIONS
Rest, take frequent sips of fluids. Follow up with your PCP for continued pain, return to the ED if you have worsening or new symptoms of concern.

## 2021-10-21 NOTE — ED PROVIDER NOTES
Subjective   Pt complains of left lower abdomen/left pelvic pain for 2 days. LMP 9/9/21, last BM this am was normal to constipated. Denies urinary symptoms, denies vaginal itching/burning/discharge. Saw her PCP today and was sent to ED to rule out an ectopic pregnancy.      History provided by:  Patient   used: No        Review of Systems   Constitutional: Negative.    HENT: Negative.    Eyes: Negative.    Respiratory: Negative.    Cardiovascular: Negative.    Gastrointestinal: Positive for abdominal pain.   Endocrine: Negative.    Genitourinary: Positive for pelvic pain.   Musculoskeletal: Negative.    Skin: Negative.    Allergic/Immunologic: Negative.    Neurological: Negative.    Hematological: Negative.    Psychiatric/Behavioral: Negative.        Past Medical History:   Diagnosis Date   • Allergies    • Anemia    • Anxiety    • Asthma    • Class 3 severe obesity due to excess calories with serious comorbidity and body mass index (BMI) of 40.0 to 44.9 in adult ([facility]) 7/26/2021   • Depression    • Diabetes mellitus ([facility])    • GERD (gastroesophageal reflux disease)    • Low blood pressure    • Migraine headache    • Nonfunctioning gallbladder    • Sleep apnea    • SOB (shortness of breath)        Allergies   Allergen Reactions   • Molds & Smuts Cough, Dizziness, Headache, Nausea And Vomiting, Shortness Of Breath and Swelling   • Penicillins Swelling     Unsure if throat swells. Just knows it caused swelling and rash.  Happened as a baby Hives         Past Surgical History:   Procedure Laterality Date   • CHOLECYSTECTOMY N/A 9/21/2020    Procedure: CHOLECYSTECTOMY LAPAROSCOPIC;  Surgeon: Saurabh Guerrero MD;  [facility];  Service: General;  Laterality: N/A;       Family History   Problem Relation Age of Onset   • Hypertension Mother    • Diabetes Mother    • Cancer Mother    • No Known Problems Father    • Cancer Maternal Aunt    • Cancer Maternal Grandmother        Social History      Socioeconomic History   • Marital status: Single   Tobacco Use   • Smoking status: Current Every Day Smoker     Packs/day: 0.50     Years: 2.00     Pack years: 1.00   • Smokeless tobacco: Never Used   Vaping Use   • Vaping Use: Never used   Substance and Sexual Activity   • Alcohol use: Never   • Drug use: Never   • Sexual activity: Yes     Partners: Male     Birth control/protection: None           Objective   Physical Exam  Constitutional:       Appearance: Normal appearance.   HENT:      Head: Normocephalic and atraumatic.      Nose: Nose normal.      Mouth/Throat:      Mouth: Mucous membranes are moist.   Eyes:      Pupils: Pupils are equal, round, and reactive to light.   Cardiovascular:      Rate and Rhythm: Normal rate and regular rhythm.      Pulses: Normal pulses.   Pulmonary:      Effort: Pulmonary effort is normal.      Breath sounds: Normal breath sounds.   Abdominal:      General: Abdomen is flat. Bowel sounds are normal.      Palpations: Abdomen is soft.      Tenderness: There is abdominal tenderness in the suprapubic area and left lower quadrant.   Musculoskeletal:         General: Normal range of motion.      Cervical back: Normal range of motion.   Skin:     General: Skin is warm and dry.      Capillary Refill: Capillary refill takes less than 2 seconds.   Neurological:      General: No focal deficit present.      Mental Status: She is alert and oriented to person, place, and time. Mental status is at baseline.   Psychiatric:         Mood and Affect: Mood normal.         Procedures           ED Course                                           MDM  Number of Diagnoses or Management Options  Left lower quadrant abdominal pain: new and requires workup  Diagnosis management comments: The patient is resting comfortably and feels better, is alert and in no distress. Repeat examination is unremarkable and benign; in particular, there's no discomfort at McBurney's point and there is no pulsatile mass.  The history, exam, diagnostic testing, and current condition does not suggest acute appendicitis, bowel obstruction, acute cholecystitis, bowel perforation, major gastrointestinal bleeding, severe diverticulitis, abdominal aortic aneurysm, mesenteric ischemia, volvulus, sepsis, or other significant pathology that warrants further testing, continued ED treatment, admission, for surgical evaluation at this point. The vital signs have been stable. The patient does not have uncontrollable pain, intractable vomiting, or other significant symptoms. The patient's condition is stable and appropriate for discharge from the emergency department.       Amount and/or Complexity of Data Reviewed  Clinical lab tests: reviewed and ordered  Tests in the radiology section of CPT®: reviewed and ordered    Risk of Complications, Morbidity, and/or Mortality  Presenting problems: low  Diagnostic procedures: low  Management options: low    Patient Progress  Patient progress: stable      Final diagnoses:   Left lower quadrant abdominal pain       ED Disposition  ED Disposition     ED Disposition Condition Comment    Discharge Stable           Dasha Jhaveri DO  [address]  [place] [address]  [phone]    Schedule an appointment as soon as possible for a visit            Medication List      New Prescriptions    ketorolac 10 MG tablet  Commonly known as: TORADOL  Take 1 tablet by mouth Every 6 (Six) Hours As Needed for Moderate Pain .           Where to Get Your Medications      These medications were sent to [facility] - [place], [place] - [address] - [phone] [facility] [phone]   [address], [place] [address]    Phone: [phone]   · ketorolac 10 MG tablet  · ondansetron ODT 4 MG disintegrating tablet          Trisha Tolbert APRN  10/21/21 2021

## 2021-10-26 PROBLEM — R10.32 LEFT LOWER QUADRANT ABDOMINAL PAIN: Status: ACTIVE | Noted: 2021-10-26

## 2021-10-26 NOTE — ASSESSMENT & PLAN NOTE
Patient's (Body mass index is 41.69 kg/m².) indicates that they are morbidly obese (BMI > 40 or > 35 with obesity - related health condition) with health conditions that include none . Weight is unchanged. BMI is is above average; BMI management plan is completed. We discussed low calorie, low carb based diet program, portion control and increasing exercise.

## 2021-10-26 NOTE — ASSESSMENT & PLAN NOTE
Psychological condition is unchanged.  Continue current treatment regimen.  Psychological condition  will be reassessed at the next regular appointment.    The patient's anxiety is not well controlled. It may have played a factor in her ER trip today. We will readdress her anxiety at her next visit. She may need a referral to therapy, psychiatry or both.

## 2021-12-02 ENCOUNTER — OFFICE VISIT (OUTPATIENT)
Dept: FAMILY MEDICINE CLINIC | Facility: CLINIC | Age: 21
End: 2021-12-02

## 2021-12-02 VITALS
SYSTOLIC BLOOD PRESSURE: 112 MMHG | OXYGEN SATURATION: 97 % | BODY MASS INDEX: 41.64 KG/M2 | DIASTOLIC BLOOD PRESSURE: 65 MMHG | HEIGHT: 64 IN | WEIGHT: 243.9 LBS | HEART RATE: 111 BPM | TEMPERATURE: 97.7 F

## 2021-12-02 DIAGNOSIS — R51.9 NONINTRACTABLE HEADACHE, UNSPECIFIED CHRONICITY PATTERN, UNSPECIFIED HEADACHE TYPE: Primary | ICD-10-CM

## 2021-12-02 DIAGNOSIS — R41.3 MEMORY LOSS: ICD-10-CM

## 2021-12-02 DIAGNOSIS — E66.01 CLASS 3 SEVERE OBESITY DUE TO EXCESS CALORIES WITH SERIOUS COMORBIDITY AND BODY MASS INDEX (BMI) OF 40.0 TO 44.9 IN ADULT (HCC): Chronic | ICD-10-CM

## 2021-12-02 DIAGNOSIS — F41.1 GAD (GENERALIZED ANXIETY DISORDER): Chronic | ICD-10-CM

## 2021-12-02 PROCEDURE — 99214 OFFICE O/P EST MOD 30 MIN: CPT | Performed by: FAMILY MEDICINE

## 2021-12-02 RX ORDER — DULOXETIN HYDROCHLORIDE 30 MG/1
30 CAPSULE, DELAYED RELEASE ORAL DAILY
Qty: 30 CAPSULE | Refills: 5 | Status: ON HOLD | OUTPATIENT
Start: 2021-12-02 | End: 2022-02-11

## 2021-12-02 RX ORDER — BUSPIRONE HYDROCHLORIDE 10 MG/1
10 TABLET ORAL 3 TIMES DAILY
Qty: 90 TABLET | Refills: 1 | Status: SHIPPED | OUTPATIENT
Start: 2021-12-02 | End: 2022-02-12 | Stop reason: HOSPADM

## 2021-12-02 NOTE — PROGRESS NOTES
Chief Complaint  Seizures (Pt states she wants to discuss possible seizures with anxiety states episodes last about 2 minutes and is having confusion. States mostly happens when anxiety is bad. ) and Anxiety    Subjective          Jayda Sharif presents to [facility]  She is here today for an acute visit.  She has a past medical history significant for type 2 diabetes, anxiety, migraine headache, morbid obesity and iron deficiency anemia.    The patient says she is taking 7-1/2 mg 3 times daily for her anxiety.  She still complaining that her anxiety is an issue.  She has not started taking the Cymbalta 30 mg daily that was prescribed recently.    She is complaining of headaches and spells of near syncope. She also is having issues with memory loss.      The patient has no other complaints today and denies chest pain, shortness of breath, weakness, numbness, dizziness or syncopal event.    Neurologic Problem  The patient's primary symptoms include an altered mental status, clumsiness, focal weakness, a loss of balance, memory loss, near-syncope, a visual change and weakness. The patient's pertinent negatives include no focal sensory loss, slurred speech or syncope. This is a recurrent problem. The current episode started more than 1 year ago. The neurological problem developed gradually. The problem has been gradually worsening since onset. There was no focality noted. Associated symptoms include abdominal pain, an auditory change, an aura, back pain, bladder incontinence, chest pain, confusion, diaphoresis, dizziness, fatigue, a fever, headaches, light-headedness, nausea, neck pain, palpitations, shortness of breath, vertigo and vomiting. Pertinent negatives include no bowel incontinence. Past treatments include acetaminophen, aspirin, drinking, eating, medication, position change, sleep and walking. The treatment provided mild relief.       Objective   Vital Signs:   BP  "112/65   Pulse 111   Temp 97.7 °F (36.5 °C)   Ht 162.6 cm (64.02\")   Wt 111 kg (243 lb 14.4 oz)   SpO2 97%   BMI 41.84 kg/m²     Physical Exam  Vitals reviewed.   Constitutional:       Appearance: Normal appearance. She is well-developed. She is obese.   HENT:      Head: Normocephalic and atraumatic.      Right Ear: External ear normal.      Left Ear: External ear normal.      Mouth/Throat:      Pharynx: No oropharyngeal exudate.   Eyes:      Conjunctiva/sclera: Conjunctivae normal.      Pupils: Pupils are equal, round, and reactive to light.   Neck:      Vascular: No carotid bruit.   Cardiovascular:      Rate and Rhythm: Normal rate and regular rhythm.      Heart sounds: No murmur heard.  No friction rub. No gallop.    Pulmonary:      Effort: Pulmonary effort is normal.      Breath sounds: Normal breath sounds. No wheezing or rhonchi.   Abdominal:      General: There is no distension.   Skin:     General: Skin is warm and dry.   Neurological:      Mental Status: She is alert and oriented to person, place, and time.      Cranial Nerves: No cranial nerve deficit.      Motor: No weakness.   Psychiatric:         Mood and Affect: Mood and affect normal.         Behavior: Behavior normal.         Thought Content: Thought content normal.         Judgment: Judgment normal.        Result Review :     CMP    CMP 5/30/21 8/25/21 10/21/21   Glucose 101 (A) 91 93   BUN 12 11 9   Creatinine 0.77 0.76 0.61   eGFR Non African Am  96 124   Sodium 136 140 139   Potassium 3.9 3.5 4.1   Chloride 101 104 103   Calcium 8.7 8.9 8.5 (A)   Albumin  4.00 3.90   Total Bilirubin  0.3 0.3   Alkaline Phosphatase  95 92   AST (SGOT)  15 21   ALT (SGPT)  13 18   (A) Abnormal value       Comments are available for some flowsheets but are not being displayed.           CBC    CBC 7/26/21 8/25/21 10/21/21   WBC 7.82 10.21 8.73   RBC 5.28 4.69 5.14   Hemoglobin 11.9 (A) 10.6 (A) 12.3   Hematocrit 38.2 34.6 39.8   MCV 72.3 (A) 73.8 (A) 77.4 (A) "   MCH 22.5 (A) 22.6 (A) 23.9 (A)   MCHC 31.2 (A) 30.6 (A) 30.9 (A)   RDW 15.2 15.9 (A) 15.7 (A)   Platelets 511 (A) 405 369   (A) Abnormal value              Answers for HPI/ROS submitted by the patient on 12/2/2021  What is the primary reason for your visit?: Neurological Problem                    Assessment and Plan {CC Problem List  Visit Diagnosis   ROS  Review (Popup)  Health Maintenance  Quality  BestPractice  Medications  SmartSets  SnapShot Encounters  Media :23}   Diagnoses and all orders for this visit:    1. Nonintractable headache, unspecified chronicity pattern, unspecified headache type (Primary)  Comments:  Her headaches could be due to migraines or from stress. I ordered her an MRI of her brain to be managed according to findings.She was told to take cymbalta.   Orders:  -     MRI Brain Without Contrast; Future    2. Memory loss  Comments:  The patient was given an order for an MRI of the brain to be managed according to findings.   Orders:  -     MRI Brain Without Contrast; Future    3. GEMMA (generalized anxiety disorder)  Assessment & Plan:  Psychological condition is improving with treatment.  Continue current treatment regimen.  Psychological condition  will be reassessed at the next regular appointment.      4. Class 3 severe obesity due to excess calories with serious comorbidity and body mass index (BMI) of 40.0 to 44.9 in adult (HCC)  Assessment & Plan:  Patient's (Body mass index is 41.84 kg/m².) indicates that they are morbidly obese (BMI > 40 or > 35 with obesity - related health condition) with health conditions that include none . Weight is unchanged. BMI is is above average; BMI management plan is completed. We discussed low calorie, low carb based diet program, portion control and increasing exercise.       Other orders  -     busPIRone (BUSPAR) 10 MG tablet; Take 1 tablet by mouth 3 (Three) Times a Day.  Dispense: 90 tablet; Refill: 1  -     DULoxetine (CYMBALTA) 30 MG  capsule; Take 1 capsule by mouth Daily.  Dispense: 30 capsule; Refill: 5      Follow Up   Return in about 3 weeks (around 12/23/2021).  Patient was given instructions and counseling regarding her condition or for health maintenance advice. Please see specific information pulled into the AVS if appropriate.

## 2021-12-02 NOTE — ASSESSMENT & PLAN NOTE
Patient's (Body mass index is 41.84 kg/m².) indicates that they are morbidly obese (BMI > 40 or > 35 with obesity - related health condition) with health conditions that include none . Weight is unchanged. BMI is is above average; BMI management plan is completed. We discussed low calorie, low carb based diet program, portion control and increasing exercise.

## 2021-12-21 ENCOUNTER — HOSPITAL ENCOUNTER (OUTPATIENT)
Dept: MRI IMAGING | Facility: HOSPITAL | Age: 21
Discharge: HOME | End: 2021-12-21
Admitting: FAMILY MEDICINE

## 2021-12-21 DIAGNOSIS — R51.9 NONINTRACTABLE HEADACHE, UNSPECIFIED CHRONICITY PATTERN, UNSPECIFIED HEADACHE TYPE: ICD-10-CM

## 2021-12-21 DIAGNOSIS — R41.3 MEMORY LOSS: ICD-10-CM

## 2021-12-21 PROCEDURE — 70551 MRI BRAIN STEM W/O DYE: CPT

## 2021-12-22 ENCOUNTER — TELEPHONE (OUTPATIENT)
Dept: FAMILY MEDICINE CLINIC | Facility: CLINIC | Age: 21
End: 2021-12-22

## 2021-12-23 ENCOUNTER — TELEPHONE (OUTPATIENT)
Dept: FAMILY MEDICINE CLINIC | Facility: CLINIC | Age: 21
End: 2021-12-23

## 2021-12-23 NOTE — TELEPHONE ENCOUNTER
Spoke with pt states she needs a note about going back to work after having seizures. States her last seizure was two days. Do you want pt to be scheduled for appt before releasing her? She currently is scheduled for 1/3/21.   None

## 2021-12-23 NOTE — TELEPHONE ENCOUNTER
Caller: Jayda Sharif    Relationship to patient: Self    Best call back number:[phone] CAN LEAVE MESSAGE ON PHONE    Patient is needing: PATIENT CALLED IN RETURNING A CALL TO OFFICE PATIENT WOULD LIKE A CALL BACK PLEASE.

## 2021-12-23 NOTE — TELEPHONE ENCOUNTER
Caller: Jayda Sharif    Relationship: Self    Best call back number: [phone]    What is the best time to reach you: ANYTIME    Who are you requesting to speak with (clinical staff, provider,  specific staff member): DR GRANADO     Do you know the name of the person who called:     What was the call regarding: PATIENT IS NEEDING A NOTE TO RETURN TO WORK AFTER HAVING COVID  FAX:[phone]      Do you require a callback: YES

## 2022-02-10 ENCOUNTER — HOSPITAL ENCOUNTER (EMERGENCY)
Facility: HOSPITAL | Age: 22
Discharge: PSYCHIATRIC OR SUBSTANCE USE FACILITY | End: 2022-02-11
Attending: EMERGENCY MEDICINE | Admitting: EMERGENCY MEDICINE

## 2022-02-10 VITALS
TEMPERATURE: 98.5 F | HEART RATE: 115 BPM | HEIGHT: 64 IN | RESPIRATION RATE: 20 BRPM | SYSTOLIC BLOOD PRESSURE: 139 MMHG | WEIGHT: 251.54 LBS | BODY MASS INDEX: 42.94 KG/M2 | DIASTOLIC BLOOD PRESSURE: 67 MMHG | OXYGEN SATURATION: 100 %

## 2022-02-10 DIAGNOSIS — R45.851 SUICIDAL IDEATION: Primary | ICD-10-CM

## 2022-02-10 LAB
ALBUMIN SERPL-MCNC: 4.1 G/DL (ref 3.5–5.2)
ALBUMIN/GLOB SERPL: 1.1 G/DL
ALP SERPL-CCNC: 97 U/L (ref 39–117)
ALT SERPL W P-5'-P-CCNC: 13 U/L (ref 1–33)
AMPHET+METHAMPHET UR QL: NEGATIVE
ANION GAP SERPL CALCULATED.3IONS-SCNC: 11.8 MMOL/L (ref 5–15)
APAP SERPL-MCNC: <5 MCG/ML (ref 0–30)
AST SERPL-CCNC: 14 U/L (ref 1–32)
BACTERIA UR QL AUTO: ABNORMAL /HPF
BARBITURATES UR QL SCN: NEGATIVE
BASOPHILS # BLD AUTO: 0.1 10*3/MM3 (ref 0–0.2)
BASOPHILS NFR BLD AUTO: 1.1 % (ref 0–1.5)
BENZODIAZ UR QL SCN: NEGATIVE
BILIRUB SERPL-MCNC: 0.3 MG/DL (ref 0–1.2)
BILIRUB UR QL STRIP: NEGATIVE
BUN SERPL-MCNC: 11 MG/DL (ref 6–20)
BUN/CREAT SERPL: 16.2 (ref 7–25)
CALCIUM SPEC-SCNC: 9 MG/DL (ref 8.6–10.5)
CANNABINOIDS SERPL QL: NEGATIVE
CHLORIDE SERPL-SCNC: 104 MMOL/L (ref 98–107)
CLARITY UR: ABNORMAL
CO2 SERPL-SCNC: 23.2 MMOL/L (ref 22–29)
COCAINE UR QL: NEGATIVE
COLOR UR: YELLOW
CREAT SERPL-MCNC: 0.68 MG/DL (ref 0.57–1)
DEPRECATED RDW RBC AUTO: 42.5 FL (ref 37–54)
EOSINOPHIL # BLD AUTO: 0.19 10*3/MM3 (ref 0–0.4)
EOSINOPHIL NFR BLD AUTO: 2.1 % (ref 0.3–6.2)
ERYTHROCYTE [DISTWIDTH] IN BLOOD BY AUTOMATED COUNT: 15.5 % (ref 12.3–15.4)
ETHANOL BLD-MCNC: <10 MG/DL (ref 0–10)
ETHANOL UR QL: <0.01 %
GFR SERPL CREATININE-BSD FRML MDRD: 108 ML/MIN/1.73
GLOBULIN UR ELPH-MCNC: 3.6 GM/DL
GLUCOSE SERPL-MCNC: 109 MG/DL (ref 65–99)
GLUCOSE UR STRIP-MCNC: NEGATIVE MG/DL
HCG SERPL QL: NEGATIVE
HCT VFR BLD AUTO: 40.2 % (ref 34–46.6)
HGB BLD-MCNC: 12.8 G/DL (ref 12–15.9)
HGB UR QL STRIP.AUTO: ABNORMAL
HOLD SPECIMEN: NORMAL
HYALINE CASTS UR QL AUTO: ABNORMAL /LPF
IMM GRANULOCYTES # BLD AUTO: 0.04 10*3/MM3 (ref 0–0.05)
IMM GRANULOCYTES NFR BLD AUTO: 0.4 % (ref 0–0.5)
INR PPP: 0.94 (ref 2–3)
KETONES UR QL STRIP: NEGATIVE
LEUKOCYTE ESTERASE UR QL STRIP.AUTO: NEGATIVE
LYMPHOCYTES # BLD AUTO: 2.43 10*3/MM3 (ref 0.7–3.1)
LYMPHOCYTES NFR BLD AUTO: 26.4 % (ref 19.6–45.3)
MCH RBC QN AUTO: 24.5 PG (ref 26.6–33)
MCHC RBC AUTO-ENTMCNC: 31.8 G/DL (ref 31.5–35.7)
MCV RBC AUTO: 77 FL (ref 79–97)
METHADONE UR QL SCN: NEGATIVE
MONOCYTES # BLD AUTO: 0.56 10*3/MM3 (ref 0.1–0.9)
MONOCYTES NFR BLD AUTO: 6.1 % (ref 5–12)
NEUTROPHILS NFR BLD AUTO: 5.89 10*3/MM3 (ref 1.7–7)
NEUTROPHILS NFR BLD AUTO: 63.9 % (ref 42.7–76)
NITRITE UR QL STRIP: NEGATIVE
NRBC BLD AUTO-RTO: 0 /100 WBC (ref 0–0.2)
OPIATES UR QL: NEGATIVE
OXYCODONE UR QL SCN: NEGATIVE
PH UR STRIP.AUTO: 7 [PH] (ref 5–8)
PLATELET # BLD AUTO: 443 10*3/MM3 (ref 140–450)
PMV BLD AUTO: 9.6 FL (ref 6–12)
POTASSIUM SERPL-SCNC: 4.3 MMOL/L (ref 3.5–5.2)
PROT SERPL-MCNC: 7.7 G/DL (ref 6–8.5)
PROT UR QL STRIP: NEGATIVE
PROTHROMBIN TIME: 10 SECONDS (ref 9.4–12)
RBC # BLD AUTO: 5.22 10*6/MM3 (ref 3.77–5.28)
RBC # UR STRIP: ABNORMAL /HPF
REF LAB TEST METHOD: ABNORMAL
SALICYLATES SERPL-MCNC: <0.3 MG/DL
SARS-COV-2 RNA RESP QL NAA+PROBE: DETECTED
SODIUM SERPL-SCNC: 139 MMOL/L (ref 136–145)
SP GR UR STRIP: 1.03 (ref 1–1.03)
SQUAMOUS #/AREA URNS HPF: ABNORMAL /HPF
TSH SERPL DL<=0.05 MIU/L-ACNC: 1.22 UIU/ML (ref 0.27–4.2)
UROBILINOGEN UR QL STRIP: ABNORMAL
WBC # UR STRIP: ABNORMAL /HPF
WBC NRBC COR # BLD: 9.21 10*3/MM3 (ref 3.4–10.8)
WHOLE BLOOD HOLD SPECIMEN: NORMAL
WHOLE BLOOD HOLD SPECIMEN: NORMAL

## 2022-02-10 PROCEDURE — 99284 EMERGENCY DEPT VISIT MOD MDM: CPT

## 2022-02-10 PROCEDURE — 80307 DRUG TEST PRSMV CHEM ANLYZR: CPT | Performed by: EMERGENCY MEDICINE

## 2022-02-10 PROCEDURE — 80050 GENERAL HEALTH PANEL: CPT

## 2022-02-10 PROCEDURE — C9803 HOPD COVID-19 SPEC COLLECT: HCPCS

## 2022-02-10 PROCEDURE — 36415 COLL VENOUS BLD VENIPUNCTURE: CPT | Performed by: EMERGENCY MEDICINE

## 2022-02-10 PROCEDURE — 82077 ASSAY SPEC XCP UR&BREATH IA: CPT | Performed by: EMERGENCY MEDICINE

## 2022-02-10 PROCEDURE — 85610 PROTHROMBIN TIME: CPT

## 2022-02-10 PROCEDURE — U0003 INFECTIOUS AGENT DETECTION BY NUCLEIC ACID (DNA OR RNA); SEVERE ACUTE RESPIRATORY SYNDROME CORONAVIRUS 2 (SARS-COV-2) (CORONAVIRUS DISEASE [COVID-19]), AMPLIFIED PROBE TECHNIQUE, MAKING USE OF HIGH THROUGHPUT TECHNOLOGIES AS DESCRIBED BY CMS-2020-01-R: HCPCS

## 2022-02-10 PROCEDURE — 84703 CHORIONIC GONADOTROPIN ASSAY: CPT | Performed by: EMERGENCY MEDICINE

## 2022-02-10 PROCEDURE — 81001 URINALYSIS AUTO W/SCOPE: CPT

## 2022-02-10 PROCEDURE — 80179 DRUG ASSAY SALICYLATE: CPT

## 2022-02-10 PROCEDURE — 80143 DRUG ASSAY ACETAMINOPHEN: CPT

## 2022-02-11 ENCOUNTER — HOSPITAL ENCOUNTER (INPATIENT)
Facility: HOSPITAL | Age: 22
LOS: 1 days | Discharge: HOME | End: 2022-02-12
Attending: PSYCHIATRY & NEUROLOGY | Admitting: PSYCHIATRY & NEUROLOGY

## 2022-02-11 PROBLEM — U07.1 COVID-19 VIRUS DETECTED: Status: ACTIVE | Noted: 2022-02-11

## 2022-02-11 PROBLEM — F43.10 POST TRAUMATIC STRESS DISORDER (PTSD): Status: ACTIVE | Noted: 2022-02-11

## 2022-02-11 PROBLEM — F33.2 SEVERE RECURRENT MAJOR DEPRESSION WITHOUT PSYCHOTIC FEATURES: Status: ACTIVE | Noted: 2022-02-11

## 2022-02-11 RX ORDER — ACETAMINOPHEN 325 MG/1
650 TABLET ORAL EVERY 6 HOURS PRN
Status: DISCONTINUED | OUTPATIENT
Start: 2022-02-11 | End: 2022-02-12 | Stop reason: HOSPADM

## 2022-02-11 RX ORDER — HALOPERIDOL 5 MG/ML
5 INJECTION INTRAMUSCULAR EVERY 4 HOURS PRN
Status: DISCONTINUED | OUTPATIENT
Start: 2022-02-11 | End: 2022-02-12 | Stop reason: HOSPADM

## 2022-02-11 RX ORDER — LORAZEPAM 2 MG/ML
2 INJECTION INTRAMUSCULAR EVERY 4 HOURS PRN
Status: DISCONTINUED | OUTPATIENT
Start: 2022-02-11 | End: 2022-02-12 | Stop reason: HOSPADM

## 2022-02-11 RX ORDER — HYDROXYZINE HYDROCHLORIDE 25 MG/1
50 TABLET, FILM COATED ORAL EVERY 6 HOURS PRN
Status: DISCONTINUED | OUTPATIENT
Start: 2022-02-11 | End: 2022-02-12 | Stop reason: HOSPADM

## 2022-02-11 RX ORDER — FAMOTIDINE 20 MG/1
20 TABLET, FILM COATED ORAL 2 TIMES DAILY PRN
Status: DISCONTINUED | OUTPATIENT
Start: 2022-02-11 | End: 2022-02-12 | Stop reason: HOSPADM

## 2022-02-11 RX ORDER — HALOPERIDOL 5 MG/1
5 TABLET ORAL EVERY 4 HOURS PRN
Status: DISCONTINUED | OUTPATIENT
Start: 2022-02-11 | End: 2022-02-12 | Stop reason: HOSPADM

## 2022-02-11 RX ORDER — VENLAFAXINE HYDROCHLORIDE 75 MG/1
75 CAPSULE, EXTENDED RELEASE ORAL
Status: DISCONTINUED | OUTPATIENT
Start: 2022-02-12 | End: 2022-02-12 | Stop reason: HOSPADM

## 2022-02-11 RX ORDER — ALUMINA, MAGNESIA, AND SIMETHICONE 2400; 2400; 240 MG/30ML; MG/30ML; MG/30ML
15 SUSPENSION ORAL EVERY 6 HOURS PRN
Status: DISCONTINUED | OUTPATIENT
Start: 2022-02-11 | End: 2022-02-12 | Stop reason: HOSPADM

## 2022-02-11 RX ORDER — LORAZEPAM 2 MG/1
2 TABLET ORAL EVERY 4 HOURS PRN
Status: DISCONTINUED | OUTPATIENT
Start: 2022-02-11 | End: 2022-02-12 | Stop reason: HOSPADM

## 2022-02-11 RX ORDER — IBUPROFEN 400 MG/1
400 TABLET ORAL EVERY 6 HOURS PRN
Status: DISCONTINUED | OUTPATIENT
Start: 2022-02-11 | End: 2022-02-12 | Stop reason: HOSPADM

## 2022-02-11 RX ORDER — DIPHENHYDRAMINE HYDROCHLORIDE 50 MG/ML
50 INJECTION INTRAMUSCULAR; INTRAVENOUS EVERY 4 HOURS PRN
Status: DISCONTINUED | OUTPATIENT
Start: 2022-02-11 | End: 2022-02-12 | Stop reason: HOSPADM

## 2022-02-11 RX ORDER — TRAZODONE HYDROCHLORIDE 100 MG/1
100 TABLET ORAL NIGHTLY PRN
Status: DISCONTINUED | OUTPATIENT
Start: 2022-02-11 | End: 2022-02-12 | Stop reason: HOSPADM

## 2022-02-11 RX ORDER — DIPHENHYDRAMINE HCL 50 MG
50 CAPSULE ORAL EVERY 4 HOURS PRN
Status: DISCONTINUED | OUTPATIENT
Start: 2022-02-11 | End: 2022-02-12 | Stop reason: HOSPADM

## 2022-02-11 RX ADMIN — ACETAMINOPHEN 650 MG: 325 TABLET ORAL at 20:37

## 2022-02-11 RX ADMIN — TRAZODONE HYDROCHLORIDE 100 MG: 100 TABLET ORAL at 22:03

## 2022-02-11 RX ADMIN — HYDROXYZINE HYDROCHLORIDE 50 MG: 25 TABLET, FILM COATED ORAL at 17:39

## 2022-02-11 RX ADMIN — IBUPROFEN 400 MG: 400 TABLET, FILM COATED ORAL at 22:03

## 2022-02-11 RX ADMIN — ACETAMINOPHEN 650 MG: 325 TABLET ORAL at 03:06

## 2022-02-11 RX ADMIN — TRAZODONE HYDROCHLORIDE 100 MG: 100 TABLET ORAL at 03:14

## 2022-02-11 RX ADMIN — ACETAMINOPHEN 650 MG: 325 TABLET ORAL at 14:38
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
## 2022-02-11 NOTE — PLAN OF CARE
Goal Outcome Evaluation:  Plan of Care Reviewed With: patient        Progress: improving    New admission from ED. Pt will have holley RN come to assess her each shift. Admitted to medical floor d/t positive covid test. Pt is stable and not expressing any thoughts of self harm at this time.

## 2022-02-11 NOTE — PLAN OF CARE
Goal Outcome Evaluation: Chart and treatment plan reviewed. Pt currently in hospital setting on medical floor due to Covid positive result. Physician contacted this  to obtain collateral information on need for hospitalization. Parents report that pt is asking for her phone, the phone communication with friends that were making derogatory remarks to her and this was feeding into her symptoms that led to stress and suicidal statements. Family indicates that they will not bring patient's phone to hospital. They feel like she needs some time away from that situation. Family report history of counseling at several different agencies but patient does not stick with therapy, her therapists in the past have informed family that pt is very manipulative and is not fully engaged in the process of getting help for her issues, but rather minimizing the need for treatment. Parents report little success with multiple medication trials. They feel that she needs some further time in hospital setting to develop a plan for safe discharge and to reflect on the nature of her stressful relationships and how to improve coping.
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
## 2022-02-11 NOTE — SIGNIFICANT NOTE
02/11/22 0215   Family Notification   Reason for Communication pt request   Family Member's Name Rafael (father)   Family Member's Relationship to Patient Parent   Notification Route Phone call   Pt requested we notify her parents of her arrival to the floor and that she tested positive for covid.

## 2022-02-11 NOTE — PLAN OF CARE
Goal Outcome Evaluation:  Plan of Care Reviewed With: patient  Patient Agreement with Plan of Care: disagrees (describe) (pt requested to leave -placed on 72 hr hold)           Pt is withdrawn to bed with CW at bedside.  She is resting.  She has poor eye contact.  She denies SI/HI, A/V hallucinations.  She rates anxiety 3, depression 4.  She is guarded.  Pt requested to leave AMA.  MD was advised and pt placed on hold.    Pt expressed concern regarding 72-hr hold.  Offered encouraged to participate in treatment plan.  Explained 72-hr hold, and that pt would be re-evaluated by MD tomorrow.  Verbalized understanding.  Demonstrated work on health vs unhealthy coping skills materials. Pt has independently completed safety plan.
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
## 2022-02-12 ENCOUNTER — READMISSION MANAGEMENT (OUTPATIENT)
Dept: CALL CENTER | Facility: HOSPITAL | Age: 22
End: 2022-02-12

## 2022-02-12 VITALS
BODY MASS INDEX: 43.18 KG/M2 | DIASTOLIC BLOOD PRESSURE: 52 MMHG | OXYGEN SATURATION: 99 % | SYSTOLIC BLOOD PRESSURE: 113 MMHG | TEMPERATURE: 98.1 F | RESPIRATION RATE: 18 BRPM | WEIGHT: 251.54 LBS | HEART RATE: 81 BPM

## 2022-02-12 RX ORDER — TRAZODONE HYDROCHLORIDE 100 MG/1
100 TABLET ORAL NIGHTLY PRN
Qty: 30 TABLET | Refills: 1 | Status: SHIPPED | OUTPATIENT
Start: 2022-02-12 | End: 2022-02-18 | Stop reason: SDUPTHER

## 2022-02-12 RX ORDER — HYDROXYZINE 50 MG/1
50 TABLET, FILM COATED ORAL 2 TIMES DAILY PRN
Qty: 30 TABLET | Refills: 0 | Status: SHIPPED | OUTPATIENT
Start: 2022-02-12

## 2022-02-12 RX ORDER — VENLAFAXINE HYDROCHLORIDE 75 MG/1
75 CAPSULE, EXTENDED RELEASE ORAL
Qty: 30 CAPSULE | Refills: 1 | Status: SHIPPED | OUTPATIENT
Start: 2022-02-13 | End: 2022-10-26 | Stop reason: SDUPTHER

## 2022-02-12 RX ADMIN — VENLAFAXINE HYDROCHLORIDE 75 MG: 75 CAPSULE, EXTENDED RELEASE ORAL at 08:05

## 2022-02-12 RX ADMIN — ACETAMINOPHEN 650 MG: 325 TABLET ORAL at 08:05

## 2022-02-12 RX ADMIN — FAMOTIDINE 20 MG: 20 TABLET, FILM COATED ORAL at 11:00

## 2022-02-12 NOTE — OUTREACH NOTE
Prep Survey      Responses   [facility] facility patient discharged from? [facility]   Is LACE score < 7 ? No   Emergency Room discharge w/ pulse ox? No   Eligibility [facility]   Date of Admission 02/11/22   Date of Discharge 02/12/22   Discharge Disposition Home or Self Care   Discharge diagnosis Severe recurrent major depression without psychotic features    covid   Does the patient have one of the following disease processes/diagnoses(primary or secondary)? COVID-19   Does the patient have Home health ordered? No   Is there a DME ordered? No   Prep survey completed? Yes          Mayte Malagon RN

## 2022-02-12 NOTE — NURSING NOTE
"Pt reports she is feeling \"better\" this evening. Denies SI/HI and any A/V hallucinations. Rates anxiety and depression both 1/10. Happy affect. Pleasant and cooperative with staff. Pt is able to make her needs known, CWA at bedside.   "

## 2022-02-12 NOTE — PLAN OF CARE
Problem: Adult Inpatient Plan of Care  Goal: Plan of Care Review  2/12/2022 0541 by Thorn, Erinne, RN  Outcome: Ongoing, Progressing  Flowsheets (Taken 2/12/2022 0541)  Progress: improving  Plan of Care Reviewed With: patient  Outcome Summary: Pt is expressing excitment about going home, CW at bedside. C/O not sleeping well and asking for PRN meds, pt resting with eyes shut in between care, easily arouses with voice. No acute distress noted, will continue to monitor.  2/12/2022 0541 by Thorn, Erinne, RN  Outcome: Ongoing, Progressing  Flowsheets (Taken 2/12/2022 0541)  Progress: improving  Plan of Care Reviewed With: patient  Outcome Summary: Pt is expressing excitment about going home, CW at bedside. C/O not sleeping well and asking for PRN meds, pt resting with eyes shut in between care, easily arouses with voice. No acute distress noted, will continue to monitor.  Goal: Patient-Specific Goal (Individualized)  2/12/2022 0541 by Thorn, Erinne, RN  Outcome: Ongoing, Progressing  2/12/2022 0541 by Thorn, Erinne, RN  Outcome: Ongoing, Progressing  Goal: Absence of Hospital-Acquired Illness or Injury  2/12/2022 0541 by Thorn, Erinne, RN  Outcome: Ongoing, Progressing  2/12/2022 0541 by Thorn, Erinne, RN  Outcome: Ongoing, Progressing  Intervention: Prevent Skin Injury  Recent Flowsheet Documentation  Taken 2/11/2022 1930 by Thorn, Erinne, RN  Skin Protection: adhesive use limited  Intervention: Prevent and Manage VTE (venous thromboembolism) Risk  Recent Flowsheet Documentation  Taken 2/11/2022 1930 by Thorn, Erinne, RN  VTE Prevention/Management: sequential compression devices off  Intervention: Prevent Infection  Recent Flowsheet Documentation  Taken 2/12/2022 0400 by Thorn, Erinne, RN  Infection Prevention:   single patient room provided   rest/sleep promoted   hand hygiene promoted  Taken 2/12/2022 0200 by Thorn, Erinne, RN  Infection Prevention:   single patient room provided   rest/sleep promoted   hand hygiene  promoted  Taken 2/12/2022 0000 by Thorn, Erinne, RN  Infection Prevention:   single patient room provided   rest/sleep promoted   hand hygiene promoted  Taken 2/11/2022 2200 by Thorn, Erinne, RN  Infection Prevention:   single patient room provided   rest/sleep promoted   hand hygiene promoted  Taken 2/11/2022 2000 by Thorn, Erinne, RN  Infection Prevention:   single patient room provided   rest/sleep promoted   hand hygiene promoted  Goal: Optimal Comfort and Wellbeing  2/12/2022 0541 by Thorn, Erinne, RN  Outcome: Ongoing, Progressing  2/12/2022 0541 by Thorn, Erinne, RN  Outcome: Ongoing, Progressing  Intervention: Provide Person-Centered Care  Recent Flowsheet Documentation  Taken 2/11/2022 1930 by Thorn, Erinne, RN  Trust Relationship/Rapport:   care explained   questions encouraged   questions answered  Goal: Readiness for Transition of Care  2/12/2022 0541 by Thorn, Erinne, RN  Outcome: Ongoing, Progressing  2/12/2022 0541 by Thorn, Erinne, RN  Outcome: Ongoing, Progressing     Problem: Suicide Risk  Goal: Absence of Self-Harm  2/12/2022 0541 by Thorn, Erinne, RN  Outcome: Ongoing, Progressing  2/12/2022 0541 by Thorn, Erinne, RN  Outcome: Ongoing, Progressing  Intervention: Promote Psychosocial Wellbeing  Recent Flowsheet Documentation  Taken 2/11/2022 1930 by Thorn, Erinne, RN  Supportive Measures:   active listening utilized   self-care encouraged  Family/Support System Care: self-care encouraged     Problem: Activity and Energy Impairment (Anxiety Signs/Symptoms)  Goal: Optimized Energy Level (Anxiety Signs/Symptoms)  Outcome: Ongoing, Progressing     Problem: Cognitive Impairment (Anxiety Signs/Symptoms)  Goal: Optimized Cognitive Function (Anxiety Signs/Symptoms)  Outcome: Ongoing, Progressing     Problem: Mood Impairment (Anxiety Signs/Symptoms)  Goal: Improved Mood Symptoms (Anxiety Signs/Symptoms)  Outcome: Ongoing, Progressing  Intervention: Optimize Emotion and Mood  Recent Flowsheet  Documentation  Taken 2/11/2022 1930 by Thorn, Erinne, RN  Supportive Measures:   active listening utilized   self-care encouraged     Problem: Sleep Disturbance (Anxiety Signs/Symptoms)  Goal: Improved Sleep (Anxiety Signs/Symptoms)  Outcome: Ongoing, Progressing     Problem: Social, Occupational or Functional Impairment (Anxiety Signs/Symptoms)  Goal: Enhanced Social, Occupational or Functional Skills (Anxiety Signs/Symptoms)  Outcome: Ongoing, Progressing  Intervention: Promote Social, Occupational and Functional Ability  Recent Flowsheet Documentation  Taken 2/11/2022 1930 by Thorn, Erinne, RN  Trust Relationship/Rapport:   care explained   questions encouraged   questions answered     Problem: Somatic Disturbance (Anxiety Signs/Symptoms)  Goal: Improved Somatic Symptoms (Anxiety Signs/Symptoms)  Outcome: Ongoing, Progressing     Problem: Activity and Energy Impairment (Depressive Signs/Symptoms)  Goal: Optimized Energy Level (Depressive Signs/Symptoms)  Outcome: Ongoing, Progressing     Problem: Cognitive Impairment (Depressive Signs/Symptoms)  Goal: Optimized Cognitive Function (Depressive Signs/Symptoms)  Outcome: Ongoing, Progressing     Problem: Decreased Participation and Engagement (Depressive Signs/Symptoms)  Goal: Increased Participation and Engagement (Depressive Signs/Symptoms)  Outcome: Ongoing, Progressing  Intervention: Facilitate Participation and Engagement  Recent Flowsheet Documentation  Taken 2/11/2022 1930 by Thorn, Erinne, RN  Supportive Measures:   active listening utilized   self-care encouraged     Problem: Feelings of Worthlessness, Hopelessness or Excessive Guilt (Depressive Signs/Symptoms)  Goal: Enhanced Self-Esteem and Confidence (Depressive Signs/Symptoms)  Outcome: Ongoing, Progressing     Problem: Mood Impairment (Depressive Signs/Symptoms)  Goal: Improved Mood Symptoms (Depressive Signs/Symptoms)  Outcome: Ongoing, Progressing  Intervention: Promote Mood Improvement  Recent  Flowsheet Documentation  Taken 2/11/2022 1930 by Thorn, Erinne, RN  Supportive Measures:   active listening utilized   self-care encouraged     Problem: Nutrition Imbalance (Depressive Signs/Symptoms)  Goal: Optimized Nutrition Intake (Depressive Signs/Symptoms)  Outcome: Ongoing, Progressing     Problem: Psychomotor Impairment (Depressive Signs/Symptoms)  Goal: Improved Psychomotor Symptoms (Depressive Signs/Symptoms)  Outcome: Ongoing, Progressing     Problem: Sleep Disturbance (Depressive Signs/Symptoms)  Goal: Improved Sleep (Depressive Signs/Symptoms)  Outcome: Ongoing, Progressing     Problem: Social, Occupational or Functional Impairment (Depressive Signs/Symptoms)  Goal: Enhanced Social, Occupational or Functional Skills (Depressive Signs/Symptoms)  Outcome: Ongoing, Progressing  Intervention: Promote Social, Occupational and Functional Ability  Recent Flowsheet Documentation  Taken 2/11/2022 1930 by Thorn, Erinne, RN  Trust Relationship/Rapport:   care explained   questions encouraged   questions answered   Goal Outcome Evaluation:  Plan of Care Reviewed With: patient        Progress: improving  Outcome Summary: Pt is expressing excitment about going home, CW at bedside. C/O not sleeping well and asking for PRN meds, pt resting with eyes shut in between care, easily arouses with voice. No acute distress noted, will continue to monitor.

## 2022-02-12 NOTE — DISCHARGE SUMMARY
[facility]         DISCHARGE SUMMARY    Patient Name: Jayda Sharif  : 2000  MRN: [id number]    Date of Admission: 2022  Date of Discharge: 2022  Primary Care Physician: Dasha Jhaveri DO    Consults     No orders found for last 30 day(s).          Presenting Problem:   Depression [F32.A]  Severe recurrent major depression without psychotic features (HCC) [F33.2]    Active and Resolved Hospital Problems:  Active Hospital Problems    Diagnosis POA   • Severe recurrent major depression without psychotic features (HCC) [F33.2] Yes   • COVID-19 virus detected [U07.1] Yes   • Post traumatic stress disorder (PTSD) [F43.10] Yes   • Migraine headache [G43.909] Yes      Resolved Hospital Problems   No resolved problems to display.         Hospital Course     Hospital Course:  Jayda Sharif is a 21 y.o. female admitted on a voluntary basis but placed on a 72-hour hold secondary to depression with suicidal ideations.  She initially signed in the hospital on a voluntary basis, but began to push for discharge and asking to sign out AMA.  Patient was felt to not be safe and was placed on a 72-hour hold.    Patient has long history of depression as well as posttraumatic stress disorder.  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  She describes periods of getting very upset, tearful, and very highly anxious.  Discussed use of hydroxyzine on an as-needed basis for anxiety and she was agreeable.  She had required this medicine on 1 occasion during her hospital stay.  Her anxiety level did not rise to that as described above that brings on seizures, and her anxiety was well controlled with hydroxyzine.    Patient been on buspirone which was recently increased she reports that it had an adverse effect.  Buspirone was discontinued during her hospital stay and she was started on a regimen of  venlafaxine 75 mg daily.  She is tolerated his medication well with no side effects.    Patient has been denying suicidal ideation since she came into the hospital.  She has been engaging cooperative with staff.  Patient has been reading materials on stress reduction as well as effective versus ineffective coping skills.  She is also worked on a safety plan.  She is calm and cooperative.  She recognizes relationships are not good in her life she needs to limit, also recognizes she needs to limit social media because she sees a direct effect on her mood by consuming too much social media.  She is calm and cooperative.  She is engaged in treatment.  She is compliant with treatment medications.  She is pleasant with appropriate affect no acute anxiety or agitation.  She is [place] and goal-directed.  Spoke with the patient's parents by phone today and they are agreeable with discharge, she lives with them and they can help ensure that she is going to be safe.    Patient was found to have COVID 19 on admission to the hospital.  She is been asymptomatic from COVID throughout the entirety of her stay.  She was on a COVID floor and COVID isolation throughout her stay.        DISCHARGE Follow Up Recommendations for labs and diagnostics: Outpatient primary care for routine health maintenance, behavioral health      Day of Discharge     Vital Signs:  Temp:  [97.5 °F (36.4 °C)-98 °F (36.7 °C)] 97.7 °F (36.5 °C)  Heart Rate:  [74-94] 86  Resp:  [18-20] 18  BP: ()/(42-67) 105/62      Pertinent  and/or Most Recent Results     LAB RESULTS:      Lab 02/10/22  2227   WBC 9.21   HEMOGLOBIN 12.8   HEMATOCRIT 40.2   PLATELETS 443   NEUTROS ABS 5.89   IMMATURE GRANS (ABS) 0.04   LYMPHS ABS 2.43   MONOS ABS 0.56   EOS ABS 0.19   MCV 77.0*   PROTIME 10.0         Lab 02/10/22  2227   SODIUM 139   POTASSIUM 4.3   CHLORIDE 104   CO2 23.2   ANION GAP 11.8   BUN 11   CREATININE 0.68   GLUCOSE 109*   CALCIUM 9.0   TSH 1.220         Lab  02/10/22  2227   TOTAL PROTEIN 7.7   ALBUMIN 4.10   GLOBULIN 3.6   ALT (SGPT) 13   AST (SGOT) 14   BILIRUBIN 0.3   ALK PHOS 97         Lab 02/10/22  2227   PROTIME 10.0   INR 0.94*                 Brief Urine Lab Results  (Last result in the past 365 days)      Color   Clarity   Blood   Leuk Est   Nitrite   Protein   CREAT   Urine HCG        02/10/22 2242 Yellow   Cloudy   Large (3+)   Negative   Negative   Negative               Microbiology Results (last 10 days)     Procedure Component Value - Date/Time    COVID-19,CEPHEID/DARCIE,COR/CJ/PAD/CINDY IN-HOUSE(OR EMERGENT/ADD-ON),NP SWAB IN TRANSPORT MEDIA 3-4 HR TAT, RT-PCR - Swab, Nasopharynx [[id number]]  (Abnormal) Collected: 02/10/22 2221    Lab Status: Final result Specimen: Swab from Nasopharynx Updated: 02/10/22 2357     COVID19 Detected    Narrative:      Fact sheet for providers: https://www.fda.gov/media/653603/download     Fact sheet for patients: https://www.fda.gov/media/290071/download  Fact sheet for providers: https://www.fda.gov/media/939849/download     Fact sheet for patients: https://www.fda.gov/media/472087/download               Results for orders placed during the hospital encounter of 08/05/21    Duplex Venous Lower Extremity - Right CAR    Interpretation Summary  · Normal right lower extremity venous duplex scan.      Results for orders placed during the hospital encounter of 08/05/21    Duplex Venous Lower Extremity - Right CAR    Interpretation Summary  · Normal right lower extremity venous duplex scan.          Imaging Results (Last 7 Days)     ** No results found for the last 168 hours. **           Labs Pending at Discharge:        Discharge Details        Discharge Medications      New Medications      Instructions Start Date   hydrOXYzine 50 MG tablet  Commonly known as: ATARAX   50 mg, Oral, 2 Times Daily PRN      venlafaxine XR 75 MG 24 hr capsule  Commonly known as: EFFEXOR-XR   75 mg, Oral, Daily With Breakfast   Start Date: February  13, 2022        Changes to Medications      Instructions Start Date   traZODone 100 MG tablet  Commonly known as: DESYREL  What changed:   medication strength  how much to take  when to take this  reasons to take this   100 mg, Oral, Nightly PRN         Stop These Medications    busPIRone 10 MG tablet  Commonly known as: BUSPAR            Allergies   Allergen Reactions   • Molds & Smuts Cough, Dizziness, Headache, Nausea And Vomiting, Shortness Of Breath and Swelling   • Penicillins Swelling     Unsure if throat swells. Just knows it caused swelling and rash.  Happened as a baby Hives           Discharge Disposition:  Home or Self Care    Diet:  Hospital:  Diet Order   Procedures   • Diet Regular         Discharge Activity:   Activity Instructions     Activity as Tolerated            Discharge Condition: Good    CODE STATUS:  Code Status and Medical Interventions:   Ordered at: 02/11/22 1607     Code Status (Patient has no pulse and is not breathing):    CPR (Attempt to Resuscitate)     Medical Interventions (Patient has pulse or is breathing):    Full Support         No future appointments.        Time spent on Discharge including face to face service: 30 minutes    Electronically signed by Patrice Alicia MD, 02/12/22, 1:20 PM EST.

## 2022-02-13 ENCOUNTER — TRANSITIONAL CARE MANAGEMENT TELEPHONE ENCOUNTER (OUTPATIENT)
Dept: CALL CENTER | Facility: HOSPITAL | Age: 22
End: 2022-02-13

## 2022-02-13 NOTE — OUTREACH NOTE
Call Center TCM Note      Responses   [facility] patient discharged from? [facility]   Does the patient have one of the following disease processes/diagnoses(primary or secondary)? COVID-19   COVID-19 underlying condition? None   TCM attempt successful? Yes  [Verbal consent ]   Call start time [phone]   Call end time 939   Discharge diagnosis Severe recurrent major depression without psychotic features    covid   Person spoke with today (if not patient) and relationship Patient   Meds reviewed with patient/caregiver? Yes   Is the patient having any side effects they believe may be caused by any medication additions or changes? No   Does the patient have all medications ordered at discharge? No  [Pharmacy closed on weekends]   Nursing Interventions Nurse provided patient education   Is the patient taking all medications as directed (includes completed medication regime)? N/A   Does the patient have a primary care provider?  Yes   Comments regarding PCP hospital fu appt on 22 at 11:00 AM   Does the patient have an appointment with their PCP or specialist within 7 days of discharge? Yes   Has the patient kept scheduled appointments due by today? N/A   Psychosocial issues? No   Did the patient receive a copy of their discharge instructions? Yes   Did the patient receive a copy of COVID-19 specific instructions? Yes   Nursing interventions Reviewed instructions with patient   What is the patient's perception of their health status since discharge? Improving   Does the patient have any of the following symptoms? None   Nursing Interventions Nurse provided patient education   Pulse Ox monitoring None   Is the patient/caregiver able to teach back steps to recovery at home? Rest and rebuild strength, gradually increase activity,  Eat a well-balance diet   If the patient is a current smoker, are they able to teach back resources for cessation? Not a smoker   Is the patient/caregiver able to teach back the  hierarchy of who to call/visit for symptoms/problems? PCP, Specialist, Home health nurse, Urgent Care, ED, 911 Yes   TCM call completed? Yes   Wrap up additional comments Pt states she just woke and has a sore/scratchy throat, denies cough, SOB, loss of taste or smell. Pt verified PCP hospital fu appt on 2/18/22. No questions/concerns.          Elizabeth Ruiz RN    2/13/2022, 09:42 EST

## 2022-02-14 ENCOUNTER — READMISSION MANAGEMENT (OUTPATIENT)
Dept: CALL CENTER | Facility: HOSPITAL | Age: 22
End: 2022-02-14

## 2022-02-14 NOTE — OUTREACH NOTE
COVID-19 Week 1 Survey      Responses   [facility] patient discharged from? [facility]   Does the patient have one of the following disease processes/diagnoses(primary or secondary)? COVID-19   COVID-19 underlying condition? None   Call Number Call 2   Week 1 Call successful? No   Discharge diagnosis Severe recurrent major depression without psychotic features    ryder Yepez LPN

## 2022-02-15 ENCOUNTER — READMISSION MANAGEMENT (OUTPATIENT)
Dept: CALL CENTER | Facility: HOSPITAL | Age: 22
End: 2022-02-15

## 2022-02-15 NOTE — OUTREACH NOTE
COVID-19 Week 1 Survey      Responses   [facility] patient discharged from? [facility]   Does the patient have one of the following disease processes/diagnoses(primary or secondary)? COVID-19   COVID-19 underlying condition? None   Call Number Call 3   Week 1 Call successful? No   Revoke Other transitional program  [pt is psychiatric pt with suicide ideation]          Lucero Merchant RN

## 2022-02-18 ENCOUNTER — OFFICE VISIT (OUTPATIENT)
Dept: FAMILY MEDICINE CLINIC | Facility: CLINIC | Age: 22
End: 2022-02-18

## 2022-02-18 VITALS
TEMPERATURE: 98.2 F | HEART RATE: 98 BPM | SYSTOLIC BLOOD PRESSURE: 110 MMHG | OXYGEN SATURATION: 98 % | DIASTOLIC BLOOD PRESSURE: 72 MMHG | HEIGHT: 64 IN | RESPIRATION RATE: 18 BRPM | WEIGHT: 247 LBS | BODY MASS INDEX: 42.17 KG/M2

## 2022-02-18 DIAGNOSIS — F33.2 SEVERE RECURRENT MAJOR DEPRESSION WITHOUT PSYCHOTIC FEATURES: ICD-10-CM

## 2022-02-18 DIAGNOSIS — Z09 HOSPITAL DISCHARGE FOLLOW-UP: Primary | ICD-10-CM

## 2022-02-18 DIAGNOSIS — R73.9 ELEVATED BLOOD SUGAR: ICD-10-CM

## 2022-02-18 DIAGNOSIS — E66.01 CLASS 3 SEVERE OBESITY DUE TO EXCESS CALORIES WITH SERIOUS COMORBIDITY AND BODY MASS INDEX (BMI) OF 40.0 TO 44.9 IN ADULT: Chronic | ICD-10-CM

## 2022-02-18 PROBLEM — U07.1 COVID-19 VIRUS DETECTED: Status: RESOLVED | Noted: 2022-02-11 | Resolved: 2022-02-18

## 2022-02-18 PROBLEM — F32.A DEPRESSION: Chronic | Status: RESOLVED | Noted: 2021-07-26 | Resolved: 2022-02-18

## 2022-02-18 PROCEDURE — 99495 TRANSJ CARE MGMT MOD F2F 14D: CPT | Performed by: FAMILY MEDICINE

## 2022-02-18 RX ORDER — TRAZODONE HYDROCHLORIDE 100 MG/1
50 TABLET ORAL NIGHTLY PRN
Qty: 30 TABLET | Refills: 1
Start: 2022-02-18 | End: 2022-10-26 | Stop reason: SDUPTHER

## 2022-02-18 NOTE — PROGRESS NOTES
"Chief Complaint  Hospital Follow Up Visit and discuss medication    Subjective          Jayda Sharif presents to [facility]  She is here today for a hospital follow-up.  She has a past medical history significant for type 2 diabetes, anxiety, migraine headache, morbid obesity and iron deficiency anemia.     She is taking 75 mg Effexor daily for her depression. She is feeling better. She is has not started taking the Atarax yet. She admits she did not sleep very well for the past few days. She took 100 mg of trazodone and slept 24 hours. She is concerned the dose is too strong.     He denies thoughts of suicide since hospital DC.     The patient has no other complaints today and denies chest pain, shortness of breath, weakness, numbness, dizziness or syncopal event.      Objective   Vital Signs:   /72 (BP Location: Left arm, Patient Position: Sitting)   Pulse 98   Temp 98.2 °F (36.8 °C)   Resp 18   Ht 162.6 cm (64.02\")   Wt 112 kg (247 lb)   SpO2 98%   BMI 42.38 kg/m²     Physical Exam  Vitals reviewed.   Constitutional:       Appearance: Normal appearance. She is well-developed. She is obese.   HENT:      Head: Normocephalic and atraumatic.      Right Ear: External ear normal.      Left Ear: External ear normal.      Mouth/Throat:      Pharynx: No oropharyngeal exudate.   Eyes:      Conjunctiva/sclera: Conjunctivae normal.      Pupils: Pupils are equal, round, and reactive to light.   Neck:      Vascular: No carotid bruit.   Cardiovascular:      Rate and Rhythm: Normal rate and regular rhythm.      Heart sounds: No murmur heard.  No friction rub. No gallop.    Pulmonary:      Effort: Pulmonary effort is normal.      Breath sounds: Normal breath sounds. No wheezing or rhonchi.   Abdominal:      General: There is no distension.   Skin:     General: Skin is warm and dry.   Neurological:      Mental Status: She is alert and oriented to person, place, and time.      " Cranial Nerves: No cranial nerve deficit.      Motor: No weakness.   Psychiatric:         Mood and Affect: Mood and affect normal.         Behavior: Behavior normal.         Thought Content: Thought content normal.         Judgment: Judgment normal.        Result Review :     CMP    CMP 8/25/21 10/21/21 2/10/22   Glucose 91 93 109 (A)   BUN 11 9 11   Creatinine 0.76 0.61 0.68   eGFR Non African Am 96 124 108   Sodium 140 139 139   Potassium 3.5 4.1 4.3   Chloride 104 103 104   Calcium 8.9 8.5 (A) 9.0   Albumin 4.00 3.90 4.10   Total Bilirubin 0.3 0.3 0.3   Alkaline Phosphatase 95 92 97   AST (SGOT) 15 21 14   ALT (SGPT) 13 18 13   (A) Abnormal value       Comments are available for some flowsheets but are not being displayed.           CBC    CBC 8/25/21 10/21/21 2/10/22   WBC 10.21 8.73 9.21   RBC 4.69 5.14 5.22   Hemoglobin 10.6 (A) 12.3 12.8   Hematocrit 34.6 39.8 40.2   MCV 73.8 (A) 77.4 (A) 77.0 (A)   MCH 22.6 (A) 23.9 (A) 24.5 (A)   MCHC 30.6 (A) 30.9 (A) 31.8   RDW 15.9 (A) 15.7 (A) 15.5 (A)   Platelets 405 369 443   (A) Abnormal value                TSH    TSH 2/10/22   TSH 1.220                     Assessment and Plan    Diagnoses and all orders for this visit:    1. Hospital discharge follow-up (Primary)  Comments:  She is stable since hospital DC and denies SI. Her depression is improving.     2. Severe recurrent major depression without psychotic features ([facility])  Assessment & Plan:  Patient's depression is recurrent and is moderate without psychosis. Their depression is currently active and the condition is improving with treatment. This will be reassessed in 3 months. F/U as described:patient will continue current medication therapy.      3. Class 3 severe obesity due to excess calories with serious comorbidity and body mass index (BMI) of 40.0 to 44.9 in adult ([facility])  Assessment & Plan:  Patient's (Body mass index is 42.38 kg/m².) indicates that they are obese (BMI >30) with health conditions that  include none . Weight is unchanged. BMI is is above average; BMI management plan is completed. We discussed low calorie, low carb based diet program, portion control and increasing exercise.       4. Elevated blood sugar  -     Hemoglobin A1c; Future    Other orders  -     traZODone (DESYREL) 100 MG tablet; Take 0.5 tablets by mouth At Night As Needed for Sleep. Indications: Trouble Sleeping  Dispense: 30 tablet; Refill: 1      Follow Up {Instructions Charge Capture  Follow-up Communications :23}  Return in about 3 months (around 5/18/2022).  Patient was given instructions and counseling regarding her condition or for health maintenance advice. Please see specific information pulled into the AVS if appropriate.

## 2022-04-19 ENCOUNTER — OFFICE VISIT (OUTPATIENT)
Dept: FAMILY MEDICINE CLINIC | Facility: CLINIC | Age: 22
End: 2022-04-19

## 2022-04-19 ENCOUNTER — HOSPITAL ENCOUNTER (OUTPATIENT)
Dept: CT IMAGING | Facility: HOSPITAL | Age: 22
Discharge: HOME | End: 2022-04-19
Admitting: FAMILY MEDICINE

## 2022-04-19 VITALS
OXYGEN SATURATION: 99 % | WEIGHT: 250.1 LBS | TEMPERATURE: 98.7 F | BODY MASS INDEX: 42.7 KG/M2 | HEIGHT: 64 IN | SYSTOLIC BLOOD PRESSURE: 104 MMHG | DIASTOLIC BLOOD PRESSURE: 63 MMHG | HEART RATE: 108 BPM | RESPIRATION RATE: 18 BRPM

## 2022-04-19 DIAGNOSIS — S09.90XA INJURY OF HEAD, INITIAL ENCOUNTER: ICD-10-CM

## 2022-04-19 DIAGNOSIS — M54.50 ACUTE LOW BACK PAIN WITHOUT SCIATICA, UNSPECIFIED BACK PAIN LATERALITY: ICD-10-CM

## 2022-04-19 DIAGNOSIS — S09.90XA INJURY OF HEAD, INITIAL ENCOUNTER: Primary | ICD-10-CM

## 2022-04-19 DIAGNOSIS — E66.01 CLASS 3 SEVERE OBESITY DUE TO EXCESS CALORIES WITH SERIOUS COMORBIDITY AND BODY MASS INDEX (BMI) OF 40.0 TO 44.9 IN ADULT: Chronic | ICD-10-CM

## 2022-04-19 PROBLEM — K81.9 CHOLECYSTITIS: Status: RESOLVED | Noted: 2020-09-16 | Resolved: 2022-04-19

## 2022-04-19 PROBLEM — Z09 HOSPITAL DISCHARGE FOLLOW-UP: Status: RESOLVED | Noted: 2022-02-18 | Resolved: 2022-04-19

## 2022-04-19 PROBLEM — K82.8 BILIARY DYSKINESIA: Status: RESOLVED | Noted: 2020-09-16 | Resolved: 2022-04-19

## 2022-04-19 PROBLEM — R10.32 LEFT LOWER QUADRANT ABDOMINAL PAIN: Status: RESOLVED | Noted: 2021-10-26 | Resolved: 2022-04-19

## 2022-04-19 PROBLEM — F43.10 POST TRAUMATIC STRESS DISORDER (PTSD): Chronic | Status: ACTIVE | Noted: 2022-02-11

## 2022-04-19 LAB
BILIRUB BLD-MCNC: NEGATIVE MG/DL
CLARITY, POC: CLEAR
COLOR UR: YELLOW
EXPIRATION DATE: ABNORMAL
GLUCOSE UR STRIP-MCNC: NEGATIVE MG/DL
KETONES UR QL: NEGATIVE
LEUKOCYTE EST, POC: NEGATIVE
Lab: ABNORMAL
NITRITE UR-MCNC: NEGATIVE MG/ML
PH UR: 7 [PH] (ref 5–8)
PROT UR STRIP-MCNC: NEGATIVE MG/DL
RBC # UR STRIP: NEGATIVE /UL
SP GR UR: 1.03 (ref 1–1.03)
UROBILINOGEN UR QL: ABNORMAL

## 2022-04-19 PROCEDURE — 81003 URINALYSIS AUTO W/O SCOPE: CPT | Performed by: FAMILY MEDICINE

## 2022-04-19 PROCEDURE — 70450 CT HEAD/BRAIN W/O DYE: CPT

## 2022-04-19 PROCEDURE — 99214 OFFICE O/P EST MOD 30 MIN: CPT | Performed by: FAMILY MEDICINE

## 2022-04-19 RX ORDER — RIMEGEPANT SULFATE 75 MG/75MG
1 TABLET, ORALLY DISINTEGRATING ORAL EVERY OTHER DAY
Qty: 15 TABLET | Refills: 2 | Status: SHIPPED | OUTPATIENT
Start: 2022-04-19 | End: 2022-06-07 | Stop reason: SDUPTHER

## 2022-04-19 NOTE — PROGRESS NOTES
"Chief Complaint  Dizziness (Fell, had seizure, hit head at work on 04/08. Has felt lightheaded since then), Migraine (For over a week), and Back Pain (Right sided back pain, thinks it may be related to kidneys, going on for a while)    Subjective          Jayda Sharif presents to [facility]  She is here today for an acute visit.  She has type 2 diabetes, anxiety, migraine headache, morbid obesity and iron deficiency anemia.     On 4/8 she was working on her 3rd day at a new job. She was feeling over anxious and went to the bathroom. She felt like she was going to pass out. At that moment she fell and hit her head. She did have a bout of vomiting. She has been having light headedness and a migraine.     She also is having right sided back pain as well. She denies dysuria or history of kidney stones.      The patient has no other complaints today and denies chest pain, shortness of breath, weakness, numbness, dizziness or syncopal event.             Objective   Vital Signs:   /63   Pulse 108   Temp 98.7 °F (37.1 °C)   Resp 18   Ht 162.6 cm (64\")   Wt 113 kg (250 lb 1.6 oz)   SpO2 99%   BMI 42.93 kg/m²            Physical Exam  Vitals reviewed.   Constitutional:       Appearance: Normal appearance. She is well-developed. She is morbidly obese.   HENT:      Head: Normocephalic and atraumatic.      Right Ear: External ear normal.      Left Ear: External ear normal.      Mouth/Throat:      Pharynx: No oropharyngeal exudate.   Eyes:      Conjunctiva/sclera: Conjunctivae normal.      Pupils: Pupils are equal, round, and reactive to light.   Neck:      Vascular: No carotid bruit.   Cardiovascular:      Rate and Rhythm: Normal rate and regular rhythm.      Heart sounds: No murmur heard.    No friction rub. No gallop.   Pulmonary:      Effort: Pulmonary effort is normal.      Breath sounds: Normal breath sounds. No wheezing or rhonchi.   Abdominal:      General: Bowel " sounds are normal. There is no distension.      Palpations: Abdomen is soft.      Tenderness: There is no abdominal tenderness.   Skin:     General: Skin is warm and dry.   Neurological:      Mental Status: She is alert and oriented to person, place, and time.      Cranial Nerves: No cranial nerve deficit.      Motor: No weakness.   Psychiatric:         Mood and Affect: Mood and affect normal.         Behavior: Behavior normal.         Thought Content: Thought content normal.         Judgment: Judgment normal.        Result Review :     CMP    CMP 8/25/21 10/21/21 2/10/22   Glucose 91 93 109 (A)   BUN 11 9 11   Creatinine 0.76 0.61 0.68   eGFR Non African Am 96 124 108   Sodium 140 139 139   Potassium 3.5 4.1 4.3   Chloride 104 103 104   Calcium 8.9 8.5 (A) 9.0   Albumin 4.00 3.90 4.10   Total Bilirubin 0.3 0.3 0.3   Alkaline Phosphatase 95 92 97   AST (SGOT) 15 21 14   ALT (SGPT) 13 18 13   (A) Abnormal value       Comments are available for some flowsheets but are not being displayed.           CBC    CBC 8/25/21 10/21/21 2/10/22   WBC 10.21 8.73 9.21   RBC 4.69 5.14 5.22   Hemoglobin 10.6 (A) 12.3 12.8   Hematocrit 34.6 39.8 40.2   MCV 73.8 (A) 77.4 (A) 77.0 (A)   MCH 22.6 (A) 23.9 (A) 24.5 (A)   MCHC 30.6 (A) 30.9 (A) 31.8   RDW 15.9 (A) 15.7 (A) 15.5 (A)   Platelets 405 369 443   (A) Abnormal value                TSH    TSH 2/10/22   TSH 1.220                     Assessment and Plan    Diagnoses and all orders for this visit:    1. Injury of head, initial encounter (Primary)  Comments:  Patient was given order today for a stat CT of the head to rule out intracranial hemorrhage or other things such as skull fracture.  Told if AMS then call 911  Orders:  -     CT Head Without Contrast; Future    2. Acute low back pain without sciatica, unspecified back pain laterality  Comments:  May be lumbago.  She was counseled about management of lumbago such as heating pads and NSAIDs.  Point-of-care urinalysis was done to  rule out UTI.  Orders:  -     Cancel: Urinalysis With Microscopic - Urine, Clean Catch; Future  -     POCT urinalysis dipstick, automated    3. Class 3 severe obesity due to excess calories with serious comorbidity and body mass index (BMI) of 40.0 to 44.9 in adult ([facility])  Assessment & Plan:  Patient's (Body mass index is 42.93 kg/m².) indicates that they are morbidly obese (BMI > 40 or > 35 with obesity - related health condition) with health conditions that include none . Weight is unchanged. BMI is is above average; BMI management plan is completed. We discussed low calorie, low carb based diet program, portion control and increasing exercise.       Other orders  -     Rimegepant Sulfate (Nurtec) 75 MG tablet dispersible tablet; Take 1 tablet by mouth Every Other Day.  Dispense: 15 tablet; Refill: 2      Follow Up   No follow-ups on file.  Patient was given instructions and counseling regarding her condition or for health maintenance advice. Please see specific information pulled into the AVS if appropriate.

## 2022-04-25 NOTE — ASSESSMENT & PLAN NOTE
Patient's (Body mass index is 42.93 kg/m².) indicates that they are morbidly obese (BMI > 40 or > 35 with obesity - related health condition) with health conditions that include none . Weight is unchanged. BMI is is above average; BMI management plan is completed. We discussed low calorie, low carb based diet program, portion control and increasing exercise.

## 2022-05-19 ENCOUNTER — OFFICE VISIT (OUTPATIENT)
Dept: FAMILY MEDICINE CLINIC | Facility: CLINIC | Age: 22
End: 2022-05-19

## 2022-05-19 VITALS
SYSTOLIC BLOOD PRESSURE: 115 MMHG | DIASTOLIC BLOOD PRESSURE: 75 MMHG | WEIGHT: 240.8 LBS | BODY MASS INDEX: 41.11 KG/M2 | HEIGHT: 64 IN | HEART RATE: 115 BPM | TEMPERATURE: 96.1 F | OXYGEN SATURATION: 97 %

## 2022-05-19 DIAGNOSIS — Z00.00 ANNUAL PHYSICAL EXAM: Primary | ICD-10-CM

## 2022-05-19 DIAGNOSIS — G43.809 OTHER MIGRAINE WITHOUT STATUS MIGRAINOSUS, NOT INTRACTABLE: Chronic | ICD-10-CM

## 2022-05-19 DIAGNOSIS — F33.2 SEVERE RECURRENT MAJOR DEPRESSION WITHOUT PSYCHOTIC FEATURES: Chronic | ICD-10-CM

## 2022-05-19 DIAGNOSIS — Z13.220 SCREENING FOR LIPID DISORDERS: ICD-10-CM

## 2022-05-19 DIAGNOSIS — R73.9 ELEVATED BLOOD SUGAR: ICD-10-CM

## 2022-05-19 DIAGNOSIS — E66.01 CLASS 3 SEVERE OBESITY DUE TO EXCESS CALORIES WITH SERIOUS COMORBIDITY AND BODY MASS INDEX (BMI) OF 40.0 TO 44.9 IN ADULT: Chronic | ICD-10-CM

## 2022-05-19 DIAGNOSIS — M25.531 RIGHT WRIST PAIN: ICD-10-CM

## 2022-05-19 PROCEDURE — 99395 PREV VISIT EST AGE 18-39: CPT | Performed by: FAMILY MEDICINE

## 2022-05-26 PROBLEM — Z00.00 ANNUAL PHYSICAL EXAM: Status: ACTIVE | Noted: 2022-05-26

## 2022-05-26 NOTE — ASSESSMENT & PLAN NOTE
Patient's (Body mass index is 41.33 kg/m².) indicates that they are morbidly obese (BMI > 40 or > 35 with obesity - related health condition) with health conditions that include none . Weight is improving with lifestyle modifications. BMI is is above average; BMI management plan is completed. We discussed portion control and increasing exercise.

## 2022-05-26 NOTE — ASSESSMENT & PLAN NOTE
The patient was encouraged today to exercise and try to lose weight.  She was encouraged to get plenty of rest.  She is encouraged to never text and drive and to buckle her seatbelt.  Patient was given orders today for screening labs to be managed according to findings.

## 2022-05-26 NOTE — ASSESSMENT & PLAN NOTE
The patient's migraine headaches are stable at this time.  It is possible that her seizure-like activity that she is having is due to a complex migraine activity.  She is on Nurtec every other day for migraine headache prevention.  If this does not appear to be controlling her migraines at her next clinic appointment we will consider other medication such as verapamil or Topamax.

## 2022-06-07 RX ORDER — RIMEGEPANT SULFATE 75 MG/75MG
1 TABLET, ORALLY DISINTEGRATING ORAL EVERY OTHER DAY
Qty: 15 TABLET | Refills: 2 | Status: SHIPPED | OUTPATIENT
Start: 2022-06-07 | End: 2022-10-26 | Stop reason: SDUPTHER

## 2022-06-13 ENCOUNTER — HOSPITAL ENCOUNTER (EMERGENCY)
Facility: HOSPITAL | Age: 22
Discharge: PSYCHIATRIC OR SUBSTANCE USE FACILITY | End: 2022-06-13
Attending: EMERGENCY MEDICINE | Admitting: EMERGENCY MEDICINE

## 2022-06-13 VITALS
HEIGHT: 62 IN | RESPIRATION RATE: 12 BRPM | SYSTOLIC BLOOD PRESSURE: 123 MMHG | HEART RATE: 52 BPM | OXYGEN SATURATION: 96 % | WEIGHT: 240.3 LBS | BODY MASS INDEX: 44.22 KG/M2 | DIASTOLIC BLOOD PRESSURE: 87 MMHG | TEMPERATURE: 98.1 F

## 2022-06-13 DIAGNOSIS — R45.851 SUICIDAL IDEATION: ICD-10-CM

## 2022-06-13 DIAGNOSIS — T50.902A INTENTIONAL DRUG OVERDOSE, INITIAL ENCOUNTER: ICD-10-CM

## 2022-06-13 DIAGNOSIS — F43.21 ADJUSTMENT DISORDER WITH DEPRESSED MOOD: Primary | ICD-10-CM

## 2022-06-13 LAB
ALBUMIN SERPL-MCNC: 3.8 G/DL (ref 3.5–5.2)
ALBUMIN/GLOB SERPL: 1.2 G/DL
ALP SERPL-CCNC: 83 U/L (ref 39–117)
ALT SERPL W P-5'-P-CCNC: 21 U/L (ref 1–33)
AMPHET+METHAMPHET UR QL: POSITIVE
ANION GAP SERPL CALCULATED.3IONS-SCNC: 11.4 MMOL/L (ref 5–15)
APAP SERPL-MCNC: <5 MCG/ML (ref 0–30)
AST SERPL-CCNC: 19 U/L (ref 1–32)
BARBITURATES UR QL SCN: NEGATIVE
BASOPHILS # BLD AUTO: 0.04 10*3/MM3 (ref 0–0.2)
BASOPHILS NFR BLD AUTO: 0.5 % (ref 0–1.5)
BENZODIAZ UR QL SCN: NEGATIVE
BILIRUB SERPL-MCNC: 0.5 MG/DL (ref 0–1.2)
BUN SERPL-MCNC: 7 MG/DL (ref 6–20)
BUN/CREAT SERPL: 10.1 (ref 7–25)
CALCIUM SPEC-SCNC: 8.9 MG/DL (ref 8.6–10.5)
CANNABINOIDS SERPL QL: POSITIVE
CHLORIDE SERPL-SCNC: 103 MMOL/L (ref 98–107)
CO2 SERPL-SCNC: 24.6 MMOL/L (ref 22–29)
COCAINE UR QL: NEGATIVE
CREAT SERPL-MCNC: 0.69 MG/DL (ref 0.57–1)
DEPRECATED RDW RBC AUTO: 39.8 FL (ref 37–54)
EGFRCR SERPLBLD CKD-EPI 2021: 126 ML/MIN/1.73
EOSINOPHIL # BLD AUTO: 0.09 10*3/MM3 (ref 0–0.4)
EOSINOPHIL NFR BLD AUTO: 1.2 % (ref 0.3–6.2)
ERYTHROCYTE [DISTWIDTH] IN BLOOD BY AUTOMATED COUNT: 14.6 % (ref 12.3–15.4)
ETHANOL BLD-MCNC: <10 MG/DL (ref 0–10)
ETHANOL UR QL: <0.01 %
GLOBULIN UR ELPH-MCNC: 3.3 GM/DL
GLUCOSE SERPL-MCNC: 98 MG/DL (ref 65–99)
HCG SERPL QL: NEGATIVE
HCT VFR BLD AUTO: 38.7 % (ref 34–46.6)
HGB BLD-MCNC: 12.8 G/DL (ref 12–15.9)
HOLD SPECIMEN: NORMAL
IMM GRANULOCYTES # BLD AUTO: 0.03 10*3/MM3 (ref 0–0.05)
IMM GRANULOCYTES NFR BLD AUTO: 0.4 % (ref 0–0.5)
LYMPHOCYTES # BLD AUTO: 2.59 10*3/MM3 (ref 0.7–3.1)
LYMPHOCYTES NFR BLD AUTO: 35.3 % (ref 19.6–45.3)
MCH RBC QN AUTO: 25.1 PG (ref 26.6–33)
MCHC RBC AUTO-ENTMCNC: 33.1 G/DL (ref 31.5–35.7)
MCV RBC AUTO: 76 FL (ref 79–97)
METHADONE UR QL SCN: NEGATIVE
MONOCYTES # BLD AUTO: 0.44 10*3/MM3 (ref 0.1–0.9)
MONOCYTES NFR BLD AUTO: 6 % (ref 5–12)
NEUTROPHILS NFR BLD AUTO: 4.14 10*3/MM3 (ref 1.7–7)
NEUTROPHILS NFR BLD AUTO: 56.6 % (ref 42.7–76)
NRBC BLD AUTO-RTO: 0 /100 WBC (ref 0–0.2)
OPIATES UR QL: NEGATIVE
OXYCODONE UR QL SCN: NEGATIVE
PLATELET # BLD AUTO: 313 10*3/MM3 (ref 140–450)
PMV BLD AUTO: 9.4 FL (ref 6–12)
POTASSIUM SERPL-SCNC: 3.2 MMOL/L (ref 3.5–5.2)
PROT SERPL-MCNC: 7.1 G/DL (ref 6–8.5)
QT INTERVAL: 437 MS
RBC # BLD AUTO: 5.09 10*6/MM3 (ref 3.77–5.28)
SALICYLATES SERPL-MCNC: <0.3 MG/DL
SARS-COV-2 RNA RESP QL NAA+PROBE: NOT DETECTED
SODIUM SERPL-SCNC: 139 MMOL/L (ref 136–145)
WBC NRBC COR # BLD: 7.33 10*3/MM3 (ref 3.4–10.8)
WHOLE BLOOD HOLD COAG: NORMAL
WHOLE BLOOD HOLD SPECIMEN: NORMAL

## 2022-06-13 PROCEDURE — 80053 COMPREHEN METABOLIC PANEL: CPT | Performed by: EMERGENCY MEDICINE

## 2022-06-13 PROCEDURE — 80307 DRUG TEST PRSMV CHEM ANLYZR: CPT | Performed by: EMERGENCY MEDICINE

## 2022-06-13 PROCEDURE — 80143 DRUG ASSAY ACETAMINOPHEN: CPT | Performed by: EMERGENCY MEDICINE

## 2022-06-13 PROCEDURE — C9803 HOPD COVID-19 SPEC COLLECT: HCPCS

## 2022-06-13 PROCEDURE — 99284 EMERGENCY DEPT VISIT MOD MDM: CPT

## 2022-06-13 PROCEDURE — 84703 CHORIONIC GONADOTROPIN ASSAY: CPT | Performed by: EMERGENCY MEDICINE

## 2022-06-13 PROCEDURE — U0003 INFECTIOUS AGENT DETECTION BY NUCLEIC ACID (DNA OR RNA); SEVERE ACUTE RESPIRATORY SYNDROME CORONAVIRUS 2 (SARS-COV-2) (CORONAVIRUS DISEASE [COVID-19]), AMPLIFIED PROBE TECHNIQUE, MAKING USE OF HIGH THROUGHPUT TECHNOLOGIES AS DESCRIBED BY CMS-2020-01-R: HCPCS | Performed by: EMERGENCY MEDICINE

## 2022-06-13 PROCEDURE — 93005 ELECTROCARDIOGRAM TRACING: CPT | Performed by: EMERGENCY MEDICINE

## 2022-06-13 PROCEDURE — 93010 ELECTROCARDIOGRAM REPORT: CPT | Performed by: INTERNAL MEDICINE

## 2022-06-13 PROCEDURE — 85025 COMPLETE CBC W/AUTO DIFF WBC: CPT | Performed by: EMERGENCY MEDICINE

## 2022-06-13 PROCEDURE — 82077 ASSAY SPEC XCP UR&BREATH IA: CPT | Performed by: EMERGENCY MEDICINE

## 2022-06-13 PROCEDURE — 80179 DRUG ASSAY SALICYLATE: CPT | Performed by: EMERGENCY MEDICINE

## 2022-06-13 RX ORDER — SODIUM CHLORIDE 0.9 % (FLUSH) 0.9 %
10 SYRINGE (ML) INJECTION AS NEEDED
Status: DISCONTINUED | OUTPATIENT
Start: 2022-06-13 | End: 2022-06-13 | Stop reason: HOSPADM

## 2022-06-13 NOTE — ED PROVIDER NOTES
Time: 12:21 AM EDT  Arrived by: ambulance  Chief Complaint: Ingestion  History provided by: pt  History is limited by: N/A     History of Present Illness:  Patient is a 21 y.o. year old female that presents to the emergency department with Ingestion. She reports that she took a whole bottle of 50 mg hydroxyzine which had 30 pills in it.  She reports that this occurred about an hour ago. She denies emesis after ingestion. She reports that she took the pills with the intent to kill herself because she was arguing with her boyfriend. She reports that she is no longer suicidal. Her mother reports that one month ago she was going to be admitted to [facility] for SI but was not admitted due to the pt being COVID-19 positive at that time.       Ingestion  Ingested substance:  Prescription medication  Time since incident:  1 hour  Ingestion amount:  30 ct, 50 mg  Witnesses present: no    Called poison control: no    Incident location:  Home  Context: depression, intentional ingestion and suicide attempt    Associated symptoms: no chest pain, no diaphoresis, no diarrhea, no headaches, no nausea, no shortness of breath and no vomiting    Risk factors: no similar prior episodes    Risk factors comment:  Hx of SI      Similar Symptoms Previously: N/A  Recently seen: N/A      Patient Care Team  Primary Care Provider: Dasha Jhaveri DO    Past Medical History:     Allergies   Allergen Reactions   • Molds & Smuts Cough, Dizziness, Headache, Nausea And Vomiting, Shortness Of Breath and Swelling   • Penicillins Swelling     Unsure if throat swells. Just knows it caused swelling and rash.  Happened as a baby Hives       Past Medical History:   Diagnosis Date   • Allergic Not Sure   • Allergies    • Anemia    • Anxiety    • Asthma    • Cholelithiasis 2020   • Class 3 severe obesity due to excess calories with serious comorbidity and body mass index (BMI) of 40.0 to 44.9 in adult ([facility]) 07/26/2021   • Depression    • Diabetes mellitus  ([facility])    • GERD (gastroesophageal reflux disease)    • Low blood pressure    • Migraine headache    • Nonfunctioning gallbladder    • Seizures ([facility]) 2021   • Sleep apnea    • SOB (shortness of breath)      Past Surgical History:   Procedure Laterality Date   • CHOLECYSTECTOMY N/A 9/21/2020    Procedure: CHOLECYSTECTOMY LAPAROSCOPIC;  Surgeon: Saurabh Guerrero MD;  Location: [facility];  Service: General;  Laterality: N/A;     Family History   Problem Relation Age of Onset   • Hypertension Mother    • Diabetes Mother    • Cancer Mother    • Depression Mother    • Miscarriages / Stillbirths Mother    • No Known Problems Father    • Cancer Maternal Aunt    • Cancer Maternal Grandmother    • Mental illness Maternal Grandmother    • Asthma Sister    • Miscarriages / Stillbirths Sister    • Alcohol abuse Maternal Uncle        Home Medications:  Prior to Admission medications    Medication Sig Start Date End Date Taking? Authorizing Provider   hydrOXYzine (ATARAX) 50 MG tablet Take 1 tablet by mouth 2 (Two) Times a Day As Needed for Anxiety. Indications: Feeling Anxious 2/12/22   Patrice Alicia MD   Rimegepant Sulfate (Nurtec) 75 MG tablet dispersible tablet Take 1 tablet by mouth Every Other Day. 6/7/22   Dasha Jhaveri DO   traZODone (DESYREL) 100 MG tablet Take 0.5 tablets by mouth At Night As Needed for Sleep. Indications: Trouble Sleeping 2/18/22   Dasha Jhaveri DO   venlafaxine XR (EFFEXOR-XR) 75 MG 24 hr capsule Take 1 capsule by mouth Daily With Breakfast. Indications: Major Depressive Disorder, Posttraumatic Stress Disorder 2/13/22   Patrice Alicia MD        Social History:   Social History     Tobacco Use   • Smoking status: Current Some Day Smoker     Packs/day: 0.50     Years: 4.00     Pack years: 2.00     Types: Cigarettes, Electronic Cigarette   • Smokeless tobacco: Never Used   Vaping Use   • Vaping Use: Every day   • Substances: Nicotine   • Devices: Disposable   Substance Use Topics   • Alcohol use: Never     " Types: 2 Standard drinks or equivalent per week     Comment: occasional   • Drug use: Never       Review of Systems:  Review of Systems   Constitutional: Negative for chills, diaphoresis and fever.   HENT: Negative for ear discharge and nosebleeds.    Eyes: Negative for photophobia.   Respiratory: Negative for shortness of breath.    Cardiovascular: Negative for chest pain.   Gastrointestinal: Negative for diarrhea, nausea and vomiting.   Genitourinary: Negative for dysuria.   Musculoskeletal: Negative for back pain and neck pain.   Skin: Negative for rash.   Neurological: Negative for headaches.        Physical Exam:  /87   Pulse 52   Temp 98.1 °F (36.7 °C)   Resp 12   Ht 157.5 cm (62\")   Wt 109 kg (240 lb 4.8 oz)   LMP  (Within Weeks)   SpO2 96%   BMI 43.95 kg/m²     Physical Exam  Vitals and nursing note reviewed.   Constitutional:       General: She is not in acute distress.     Appearance: Normal appearance.   HENT:      Head: Normocephalic and atraumatic.      Nose: Nose normal.   Eyes:      General: No scleral icterus.  Cardiovascular:      Rate and Rhythm: Normal rate and regular rhythm.      Heart sounds: Normal heart sounds.   Pulmonary:      Effort: Pulmonary effort is normal. No respiratory distress.      Breath sounds: Normal breath sounds.   Abdominal:      Palpations: Abdomen is soft.      Tenderness: There is no abdominal tenderness.   Musculoskeletal:         General: Normal range of motion.      Cervical back: Neck supple.      Right lower leg: No edema.      Left lower leg: No edema.   Skin:     General: Skin is warm and dry.   Neurological:      General: No focal deficit present.      Mental Status: She is alert and oriented to person, place, and time.      Sensory: No sensory deficit.      Motor: No weakness.   Psychiatric:         Mood and Affect: Mood is depressed. Affect is flat.                Medications in the Emergency Department:  Medications - No data to display "     Labs  Lab Results (last 24 hours)     ** No results found for the last 24 hours. **           Imaging:  No Radiology Exams Resulted Within Past 24 Hours    Procedures:  Procedures    Progress  ED Course as of 06/15/22 1313   Mon Jun 13, 2022   0039 EKG:    Rhythm: Normal sinus rhythm  Rate: 67  Intervals: Normal  T-wave: Normal  ST Segment: Normal    EKG Comparison: 8/25/2021 similar    Interpreted by me   [NL]      ED Course User Index  [NL] Frederic Hayes DO                            Medical Decision Making:  MDM   21-year-old female patient allegedly took an intentional overdose of approximately 30 hydroxyzine 50 mg tablets.  Patient had an argument with her boyfriend and felt suicidal.  At time of initial assessment patient states she no longer feels suicidal.  Poison control was contacted and they recommended 6-8 hour observation  for medical clearance.  Patient was outside the window for charcoal administration.  Patient is denying any other symptoms.  Patient had been admitted to [facility] in February.  She was diagnosed with severe recurrent major depression.      Final diagnoses:   Adjustment disorder with depressed mood   Intentional drug overdose, initial encounter ([facility])   Suicidal ideation        Disposition:  ED Disposition     ED Disposition   Transfer to Another Facility     Condition   --    Comment   [facility]             Documentation assistance provided by Frederic Hayes DO acting as scribe for Frederic Hayes DO. Information recorded by the scribe was done at my direction and has been verified and validated by me.        Jose Guo  06/13/22 0051       Frederic Hayes DO  06/15/22 1313

## 2022-06-13 NOTE — ED NOTES
RN spoke with Rafia TIERNEY at poison control, advised to monitor patient for anticholinergic effects, draw aspirin, tylenol levels, basic labs, EKG. Benzos as needed for agitation. Monitor for 6-8 hours post ingestion. Dr Hayes notified.

## 2022-06-23 NOTE — PROGRESS NOTES
"Chief Complaint  labs, Follow-up (Seizures,migraines), and Fall (Pt fell in yard on 5/13/2022 and hurt right wrist.)    Subjective          Jayda Sharif presents to [facility]  She is here today for an acute visit.  She has anxiety, migraine headache, morbid obesity and iron deficiency anemia.    Her most recent seizure like activity was May 5th.  She is currently not on any seizure medication.  Her seizures appear to be more pseudoseizure in nature.     The patient has no other complaints today and denies chest pain, shortness of breath, weakness, numbness, dizziness or syncopal event.           Objective   Vital Signs:  /75   Pulse 115   Temp 96.1 °F (35.6 °C) (Temporal)   Ht 162.6 cm (64\")   Wt 109 kg (240 lb 12.8 oz)   SpO2 97%   BMI 41.33 kg/m²         Physical Exam  Vitals reviewed.   Constitutional:       Appearance: Normal appearance. She is well-developed. She is morbidly obese.   HENT:      Head: Normocephalic and atraumatic.      Right Ear: External ear normal.      Left Ear: External ear normal.      Mouth/Throat:      Pharynx: No oropharyngeal exudate.   Eyes:      Conjunctiva/sclera: Conjunctivae normal.      Pupils: Pupils are equal, round, and reactive to light.   Neck:      Vascular: No carotid bruit.   Cardiovascular:      Rate and Rhythm: Normal rate and regular rhythm.      Heart sounds: No murmur heard.    No friction rub. No gallop.   Pulmonary:      Effort: Pulmonary effort is normal.      Breath sounds: Normal breath sounds. No wheezing or rhonchi.   Abdominal:      General: There is no distension.   Skin:     General: Skin is warm and dry.   Neurological:      Mental Status: She is alert and oriented to person, place, and time.      Cranial Nerves: No cranial nerve deficit.      Motor: No weakness.   Psychiatric:         Mood and Affect: Mood and affect normal.         Behavior: Behavior normal.         Thought Content: Thought content " normal.         Judgment: Judgment normal.        Result Review :     CMP    CMP 8/25/21 10/21/21 2/10/22   Glucose 91 93 109 (A)   BUN 11 9 11   Creatinine 0.76 0.61 0.68   eGFR Non African Am 96 124 108   Sodium 140 139 139   Potassium 3.5 4.1 4.3   Chloride 104 103 104   Calcium 8.9 8.5 (A) 9.0   Albumin 4.00 3.90 4.10   Total Bilirubin 0.3 0.3 0.3   Alkaline Phosphatase 95 92 97   AST (SGOT) 15 21 14   ALT (SGPT) 13 18 13   (A) Abnormal value       Comments are available for some flowsheets but are not being displayed.           CBC    CBC 8/25/21 10/21/21 2/10/22   WBC 10.21 8.73 9.21   RBC 4.69 5.14 5.22   Hemoglobin 10.6 (A) 12.3 12.8   Hematocrit 34.6 39.8 40.2   MCV 73.8 (A) 77.4 (A) 77.0 (A)   MCH 22.6 (A) 23.9 (A) 24.5 (A)   MCHC 30.6 (A) 30.9 (A) 31.8   RDW 15.9 (A) 15.7 (A) 15.5 (A)   Platelets 405 369 443   (A) Abnormal value                TSH    TSH 2/10/22   TSH 1.220                     Assessment and Plan    Diagnoses and all orders for this visit:    1. Annual physical exam (Primary)  Assessment & Plan:  The patient was encouraged today to exercise and try to lose weight.  She was encouraged to get plenty of rest.  She is encouraged to never text and drive and to buckle her seatbelt.  Patient was given orders today for screening labs to be managed according to findings.    Orders:  -     Comprehensive Metabolic Panel; Future  -     CBC & Differential; Future  -     TSH; Future    2. Class 3 severe obesity due to excess calories with serious comorbidity and body mass index (BMI) of 40.0 to 44.9 in adult (HCC)  Assessment & Plan:  Patient's (Body mass index is 41.33 kg/m².) indicates that they are morbidly obese (BMI > 40 or > 35 with obesity - related health condition) with health conditions that include none . Weight is improving with lifestyle modifications. BMI is is above average; BMI management plan is completed. We discussed portion control and increasing exercise.       3. Right wrist  pain  -     XR Wrist 3+ View Right; Future    4. Elevated blood sugar  -     Hemoglobin A1c; Future    5. Screening for lipid disorders  -     Lipid Panel; Future    6. Severe recurrent major depression without psychotic features (HCC)  Assessment & Plan:  Patient's depression is recurrent and is moderate without psychosis. Their depression is currently active and the condition is unchanged. This will be reassessed at the next regular appointment. F/U as described:patient will continue current medication therapy.      7. Other migraine without status migrainosus, not intractable  Assessment & Plan:  The patient's migraine headaches are stable at this time.  It is possible that her seizure-like activity that she is having is due to a complex migraine activity.  She is on Nurtec every other day for migraine headache prevention.  If this does not appear to be controlling her migraines at her next clinic appointment we will consider other medication such as verapamil or Topamax.                 Follow Up   Return in about 2 months (around 7/19/2022).  Patient was given instructions and counseling regarding her condition or for health maintenance advice. Please see specific information pulled into the AVS if appropriate.        Oriented - self; Oriented - place; Oriented - time

## 2022-06-29 ENCOUNTER — OFFICE VISIT (OUTPATIENT)
Dept: FAMILY MEDICINE CLINIC | Facility: CLINIC | Age: 22
End: 2022-06-29

## 2022-06-29 VITALS
DIASTOLIC BLOOD PRESSURE: 60 MMHG | HEART RATE: 102 BPM | WEIGHT: 246 LBS | TEMPERATURE: 98.6 F | HEIGHT: 62 IN | OXYGEN SATURATION: 97 % | SYSTOLIC BLOOD PRESSURE: 107 MMHG | BODY MASS INDEX: 45.27 KG/M2 | RESPIRATION RATE: 18 BRPM

## 2022-06-29 DIAGNOSIS — L29.9 PRURITUS: ICD-10-CM

## 2022-06-29 DIAGNOSIS — L23.9 ALLERGIC DERMATITIS: Primary | ICD-10-CM

## 2022-06-29 PROCEDURE — 96372 THER/PROPH/DIAG INJ SC/IM: CPT

## 2022-06-29 PROCEDURE — 99214 OFFICE O/P EST MOD 30 MIN: CPT

## 2022-06-29 RX ORDER — TRIAMCINOLONE ACETONIDE 40 MG/ML
60 INJECTION, SUSPENSION INTRA-ARTICULAR; INTRAMUSCULAR ONCE
Status: COMPLETED | OUTPATIENT
Start: 2022-06-29 | End: 2022-06-29

## 2022-06-29 RX ORDER — DIPHENHYDRAMINE HCL 25 MG
25 TABLET ORAL EVERY 6 HOURS PRN
Qty: 28 TABLET | Refills: 0 | Status: SHIPPED | OUTPATIENT
Start: 2022-06-29 | End: 2022-07-06

## 2022-06-29 RX ADMIN — TRIAMCINOLONE ACETONIDE 60 MG: 40 INJECTION, SUSPENSION INTRA-ARTICULAR; INTRAMUSCULAR at 09:39

## 2022-06-29 NOTE — PROGRESS NOTES
Chief Complaint   Patient presents with   • Rash     Widespread itchy rash. Only changes she has done is some of her medications have been increased, swimming in a salt water pool, and staying at her grandmothers house who uses Tide detergent.    • Facial Swelling     Lip is swelling.        Subjective          Jayda Sharif presents to [facility]    She is here for an acute visit. She has widespread itching that started after swimming in a salt water pool and staying with her grandma. Her grandma uses tide and her mom does not. She woke up this morning with a swollen lip. She used to take hydroxyzine but she used it to try to overdose on June 13th. She no longer has that in her possession and I have explained to her I will not prescribe it. I have suggested a steroid shot and benadryl cream.       Past History:  Medical History: has a past medical history of Allergic (Not Sure), Allergies, Anemia, Anxiety, Asthma, Cholelithiasis (2020), Class 3 severe obesity due to excess calories with serious comorbidity and body mass index (BMI) of 40.0 to 44.9 in adult ([facility]) (07/26/2021), Depression, Diabetes mellitus ([facility]), GERD (gastroesophageal reflux disease), Low blood pressure, Migraine headache, Nonfunctioning gallbladder, Seizures ([facility]) (2021), Sleep apnea, and SOB (shortness of breath).   Surgical History: has a past surgical history that includes Cholecystectomy (N/A, 9/21/2020).   Family History: family history includes Alcohol abuse in her maternal uncle; Asthma in her sister; Cancer in her maternal aunt, maternal grandmother, and mother; Depression in her mother; Diabetes in her mother; Hypertension in her mother; Mental illness in her maternal grandmother; Miscarriages / Stillbirths in her mother and sister; No Known Problems in her father.   Social History: reports that she has been smoking cigarettes and electronic cigarette. She started smoking about 4 years ago. She has  "a 2.00 pack-year smoking history. She has never used smokeless tobacco. She reports that she does not drink alcohol and does not use drugs.  Allergies: Molds & smuts and Penicillins  (Not in a hospital admission)       Social History     Socioeconomic History   • Marital status: Single   Tobacco Use   • Smoking status: Current Some Day Smoker     Packs/day: 0.50     Years: 4.00     Pack years: 2.00     Types: Cigarettes, Electronic Cigarette     Start date: 2018   • Smokeless tobacco: Never Used   Vaping Use   • Vaping Use: Every day   • Substances: Nicotine   • Devices: Disposable   Substance and Sexual Activity   • Alcohol use: Never     Types: 2 Standard drinks or equivalent per week     Comment: occasional   • Drug use: Never   • Sexual activity: Yes     Partners: Male     Birth control/protection: None       Health Maintenance Due   Topic Date Due   • HEMOGLOBIN A1C  01/26/2022       Objective     Vital Signs:   /60 (BP Location: Left arm, Patient Position: Sitting)   Pulse 102   Temp 98.6 °F (37 °C) (Infrared)   Resp 18   Ht 157.5 cm (62\")   Wt 112 kg (246 lb)   SpO2 97%   BMI 44.99 kg/m²       Physical Exam  Constitutional:       Appearance: Normal appearance.   HENT:      Nose: Nose normal.      Mouth/Throat:      Mouth: Mucous membranes are moist.   Cardiovascular:      Rate and Rhythm: Normal rate and regular rhythm.      Pulses: Normal pulses.      Heart sounds: Normal heart sounds.   Pulmonary:      Effort: Pulmonary effort is normal.      Breath sounds: Normal breath sounds.   Skin:     General: Skin is warm and dry.   Neurological:      General: No focal deficit present.      Mental Status: She is alert and oriented to person, place, and time.   Psychiatric:         Mood and Affect: Mood normal.         Behavior: Behavior normal.          Review of Systems   Skin: Positive for rash.   All other systems reviewed and are negative.       Result Review :                 Assessment and Plan  "   Diagnoses and all orders for this visit:    1. Allergic dermatitis (Primary)  -     triamcinolone acetonide (KENALOG-40) injection 60 mg  -     Ambulatory Referral to Allergy  -     diphenhydrAMINE (Benadryl Allergy) 25 MG tablet; Take 1 tablet by mouth Every 6 (Six) Hours As Needed for Itching for up to 7 days.  Dispense: 28 tablet; Refill: 0    2. Pruritus  -     diphenhydrAMINE (Benadryl Allergy) 25 MG tablet; Take 1 tablet by mouth Every 6 (Six) Hours As Needed for Itching for up to 7 days.  Dispense: 28 tablet; Refill: 0          Pt thought to be clinically stable at this time.    Follow Up   No follow-ups on file.  Patient was given instructions and counseling regarding her condition or for health maintenance advice. Please see specific information pulled into the AVS if appropriate.

## 2022-08-20 ENCOUNTER — TELEMEDICINE (OUTPATIENT)
Dept: FAMILY MEDICINE CLINIC | Facility: TELEHEALTH | Age: 22
End: 2022-08-20

## 2022-08-20 DIAGNOSIS — Z20.822 EXPOSURE TO COVID-19 VIRUS: Primary | ICD-10-CM

## 2022-08-20 PROCEDURE — 99212 OFFICE O/P EST SF 10 MIN: CPT | Performed by: NURSE PRACTITIONER

## 2022-08-20 NOTE — PATIENT INSTRUCTIONS
10 Things You Can Do to Manage Your COVID-19 Symptoms at Home  If you have possible or confirmed COVID-19:  Stay home except to get medical care.  Monitor your symptoms carefully. If your symptoms get worse, call your healthcare provider immediately.  Get rest and stay hydrated.  If you have a medical appointment, call the healthcare provider ahead of time and tell them that you have or may have COVID-19.  For medical emergencies, call 911 and notify the dispatch personnel that you have or may have COVID-19.  Cover your cough and sneezes with a tissue or use the inside of your elbow.  Wash your hands often with soap and water for at least 20 seconds or clean your hands with an alcohol-based hand  that contains at least 60% alcohol.  As much as possible, stay in a specific room and away from other people in your home. Also, you should use a separate bathroom, if available. If you need to be around other people in or outside of the home, wear a mask.  Avoid sharing personal items with other people in your household, like dishes, towels, and bedding.  Clean all surfaces that are touched often, like counters, tabletops, and doorknobs. Use household cleaning sprays or wipes according to the label instructions.  cdc.gov/coronavirus  07/16/2021  This information is not intended to replace advice given to you by your health care provider. Make sure you discuss any questions you have with your health care provider.  Document Revised: 11/01/2021 Document Reviewed: 11/01/2021  [employer] Patient Education © 2021 [employer]  How to Quarantine at Home  Information for Patients and Families    These instructions are for people with confirmed or suspected COVID-19 who do not need to be hospitalized and those with confirmed COVID-19 who were hospitalized and discharged to care for themselves at home.    If you were tested through the Health Department  The Health Department will monitor your wellbeing.  If it is  determined that you do not need to be hospitalized and can be isolated at home, you will be monitored by staff from your local or state health department.     If you were tested through a Commercial Lab  You will need to monitor yourself and report changes in your symptoms to your doctor.  See the section below called Monitor Your Symptoms.    Follow these steps until a healthcare provider or local or state health department says you can return to your normal activities.    Stay home except to get medical care  Restrict activities outside your home, except for getting medical care.   Do not go to work, school, or public areas.   Avoid using public transportation, ride-sharing, or taxis.    Separate yourself from other people and animals in your home  People  As much as possible, you should stay in a specific room and away from other people in your home. Also, you should use a separate bathroom, if available.    Animals  You should restrict contact with pets and other animals while you are sick with COVID-19, just like you would around other people. When possible, have another member of your household care for your animals while you are sick. If you are sick with COVID-19, avoid contact with your pet, including petting, snuggling, being kissed or licked, and sharing food. If you must care for your pet or be around animals while you are sick, wash your hands before and after you interact with pets and wear a facemask. See COVID-19 and Animals for more information.    Call ahead before visiting your doctor  If you have a medical appointment, call the healthcare provider and tell them that you have or may have COVID-19. This information will help the healthcare provider’s office take steps to keep other people from getting infected or exposed.    Wear a facemask  You should wear a facemask when you are around other people (e.g., sharing a room or vehicle) or pets and before you enter a healthcare provider’s office.      If you are not able to wear a facemask (for example, because it causes trouble breathing), then people who live with you should not stay in the same room with you, or they should wear a facemask if they enter your room.    Cover your coughs and sneezes  Cover your mouth and nose with a tissue when you cough or sneeze.   Throw used tissues in a lined trash can.   Immediately wash your hands with soap and water for at least 20 seconds or, if soap and water are not available, clean your hands with an alcohol-based hand  that contains at least 60% alcohol.    Clean your hands often  Wash your hands often with soap and water for at least 20 seconds, especially after blowing your nose, coughing, or sneezing; going to the bathroom; and before eating or preparing food.     If soap and water are not readily available, use an alcohol-based hand  with at least 60% alcohol, covering all surfaces of your hands and rubbing them together until they feel dry.    Soap and water are the best option if hands are visibly dirty. Avoid touching your eyes, nose, and mouth with unwashed hands.    Avoid sharing personal household items  You should not share dishes, drinking glasses, cups, eating utensils, towels, or bedding with other people or pets in your home.   After using these items, they should be washed thoroughly with soap and water.    Clean all “high-touch” surfaces everyday  High touch surfaces include counters, tabletops, doorknobs, bathroom fixtures, toilets, phones, keyboards, tablets, and bedside tables.   Also, clean any surfaces that may have blood, stool, or body fluids on them.   Use a household cleaning spray or wipe, according to the label instructions. Labels contain instructions for safe and effective use of the cleaning product, including precautions you should take when applying the product, such as wearing gloves and making sure you have good ventilation during use of the product.    Monitor  your symptoms  Seek prompt medical attention if your illness is worsening (e.g., difficulty breathing).   Before seeking care, call your healthcare provider and tell them that you have, or are being evaluated for, COVID-19.   Put on a facemask before you enter the facility.     These steps will help the healthcare provider’s office to keep other people in the office or waiting room from getting infected or exposed.   Persons who are placed under active monitoring or facilitated self-monitoring should follow instructions provided by their local health department or occupational health professionals, as appropriate.  If you have a medical emergency and need to call 911, notify the dispatch personnel that you have, or are being evaluated for COVID-19. If possible, put on a facemask before emergency medical services arrive.    Discontinuing home isolation  Patients with confirmed COVID-19 should remain under home isolation precautions until the risk of secondary transmission to others is thought to be low. The decision to discontinue home isolation precautions should be made on a case-by-case basis, in consultation with healthcare providers and state and local health departments.    The below content are for household members, intimate partners, and caregivers of a patient with symptomatic laboratory-confirmed COVID-19 or a patient under investigation:    Household members, intimate partners, and caregivers may have close contact with a person with symptomatic, laboratory-confirmed COVID-19 or a person under investigation.     Close contacts should monitor their health; they should call their healthcare provider right away if they develop symptoms suggestive of COVID-19 (e.g., fever, cough, shortness of breath)     Close contacts should also follow these recommendations:  Make sure that you understand and can help the patient follow their healthcare provider’s instructions for medication(s) and care. You should help the  patient with basic needs in the home and provide support for getting groceries, prescriptions, and other personal needs.  Monitor the patient’s symptoms. If the patient is getting sicker, call his or her healthcare provider and tell them that the patient has laboratory-confirmed COVID-19. This will help the healthcare provider’s office take steps to keep other people in the office or waiting room from getting infected. Ask the healthcare provider to call the local or [facility] health department for additional guidance. If the patient has a medical emergency and you need to call 911, notify the dispatch personnel that the patient has, or is being evaluated for COVID-19.  Household members should stay in another room or be  from the patient as much as possible. Household members should use a separate bedroom and bathroom, if available.  Prohibit visitors who do not have an essential need to be in the home.  Household members should care for any pets in the home. Do not handle pets or other animals while sick.  For more information, see COVID-19 and Animals.  Make sure that shared spaces in the home have good air flow, such as by an air conditioner or an opened window, weather permitting.  Perform hand hygiene frequently. Wash your hands often with soap and water for at least 20 seconds or use an alcohol-based hand  that contains 60 to 95% alcohol, covering all surfaces of your hands and rubbing them together until they feel dry. Soap and water should be used preferentially if hands are visibly dirty.  Avoid touching your eyes, nose, and mouth with unwashed hands.  The patient should wear a facemask when you are around other people. If the patient is not able to wear a facemask (for example, because it causes trouble breathing), you, as the caregiver, should wear a mask when you are in the same room as the patient.  Wear a disposable facemask and gloves when you touch or have contact with the patient’s  blood, stool, or body fluids, such as saliva, sputum, nasal mucus, vomit, or urine.   Throw out disposable facemasks and gloves after using them. Do not reuse.  When removing personal protective equipment, first remove and dispose of gloves. Then, immediately clean your hands with soap and water or alcohol-based hand . Next, remove and dispose of facemask, and immediately clean your hands again with soap and water or alcohol-based hand .  Avoid sharing household items with the patient. You should not share dishes, drinking glasses, cups, eating utensils, towels, bedding, or other items. After the patient uses these items, you should wash them thoroughly (see below “Wash laundry thoroughly”).  Clean all “high-touch” surfaces, such as counters, tabletops, doorknobs, bathroom fixtures, toilets, phones, keyboards, tablets, and bedside tables, every day. Also, clean any surfaces that may have blood, stool, or body fluids on them.   Use a household cleaning spray or wipe, according to the label instructions. Labels contain instructions for safe and effective use of the cleaning product including precautions you should take when applying the product, such as wearing gloves and making sure you have good ventilation during use of the product.  Wash laundry thoroughly.   Immediately remove and wash clothes or bedding that have blood, stool, or body fluids on them.  Wear disposable gloves while handling soiled items and keep soiled items away from your body. Clean your hands (with soap and water or an alcohol-based hand ) immediately after removing your gloves.  Read and follow directions on labels of laundry or clothing items and detergent. In general, using a normal laundry detergent according to washing machine instructions and dry thoroughly using the warmest temperatures recommended on the clothing label.  Place all used disposable gloves, facemasks, and other contaminated items in a lined  container before disposing of them with other household waste. Clean your hands (with soap and water or an alcohol-based hand ) immediately after handling these items. Soap and water should be used preferentially if hands are visibly dirty.  Discuss any additional questions with your state or local health department or healthcare provider.    Adapted from information provided by the Centers for Disease Control and Prevention.  For more information, visit https://www.cdc.gov/coronavirus/2019-ncov/hcp/guidance-prevent-spread.html

## 2022-08-20 NOTE — PROGRESS NOTES
You have chosen to receive care through a telehealth visit.  Do you consent to use a video/audio connection for your medical care today? Yes     CHIEF COMPLAINT  No chief complaint on file.        HPI  Jayda Sharif is a 21 y.o. female  presents with complaint of 1 day history of nasal congestion, loss of taste/smell, runny nose, headache, sore throat.  Denies fever, body ache, chills, shortness of breath, cough.  She does not know if she needs to test for COVID or not    Cousin's moth whom she has been around tested positive for COVID    Review of Systems   Constitutional: Negative for chills and fever.   HENT: Positive for congestion, postnasal drip, rhinorrhea and sore throat.         Loss of taste/smell   Respiratory: Negative for cough.    All other systems reviewed and are negative.      Past Medical History:   Diagnosis Date   • Allergic Not Sure   • Allergies    • Anemia    • Anxiety    • Asthma    • Cholelithiasis 2020   • Class 3 severe obesity due to excess calories with serious comorbidity and body mass index (BMI) of 40.0 to 44.9 in adult ([facility]) 07/26/2021   • Depression    • Diabetes mellitus ([facility])    • GERD (gastroesophageal reflux disease)    • Low blood pressure    • Migraine headache    • Nonfunctioning gallbladder    • Seizures ([facility]) 2021   • Sleep apnea    • SOB (shortness of breath)        Family History   Problem Relation Age of Onset   • Hypertension Mother    • Diabetes Mother    • Cancer Mother    • Depression Mother    • Miscarriages / Stillbirths Mother    • No Known Problems Father    • Cancer Maternal Aunt    • Cancer Maternal Grandmother    • Mental illness Maternal Grandmother    • Asthma Sister    • Miscarriages / Stillbirths Sister    • Alcohol abuse Maternal Uncle        Social History     Socioeconomic History   • Marital status: Single   Tobacco Use   • Smoking status: Current Some Day Smoker     Packs/day: 0.50     Years: 4.00     Pack years: 2.00     Types: Cigarettes,  Electronic Cigarette     Start date: 2018   • Smokeless tobacco: Never Used   Vaping Use   • Vaping Use: Every day   • Substances: Nicotine   • Devices: Disposable   Substance and Sexual Activity   • Alcohol use: Never     Types: 2 Standard drinks or equivalent per week     Comment: occasional   • Drug use: Never   • Sexual activity: Yes     Partners: Male     Birth control/protection: None       Jayda Sharif  reports that she has been smoking cigarettes and electronic cigarette. She started smoking about 4 years ago. She has a 2.00 pack-year smoking history. She has never used smokeless tobacco.. I have educated her on the risk of diseases from using tobacco products such as cancer, COPD and heart disease.     I advised her to quit and she is not willing to quit.    I spent 4 minutes counseling the patient.              There were no vitals taken for this visit.    PHYSICAL EXAM  Physical Exam   Constitutional: She is oriented to person, place, and time. She appears well-developed and well-nourished. She does not have a sickly appearance. She appears ill.   HENT:   Head: Normocephalic and atraumatic.   Pulmonary/Chest: Effort normal.  No respiratory distress.  Neurological: She is alert and oriented to person, place, and time.         Diagnoses and all orders for this visit:    1. Exposure to COVID-19 virus (Primary)    --recommend test for COVID to rule out infection  --she will perform at home COVID test; refuses to go out of house to get PCR test performed      FOLLOW-UP  As discussed during visit with PCP/[facility] if no improvement or Urgent Care/Emergency Department if worsening of symptoms    Patient verbalizes understanding of medication dosage, comfort measures, instructions for treatment and follow-up.    KAVITA Shabazz  08/20/2022  14:16 EDT    The use of a video visit has been reviewed with the patient and verbal informed consent has been obtained. Myself and Jayda Sharif  participated in this visit. The patient is located in [address] [place], [place] [id number].    I am located in [place], [place]. Mychart and Zoom were utilized. I spent 10 minutes in the patient's chart for this visit.

## 2022-10-26 ENCOUNTER — TELEPHONE (OUTPATIENT)
Dept: FAMILY MEDICINE CLINIC | Facility: CLINIC | Age: 22
End: 2022-10-26

## 2022-10-26 NOTE — TELEPHONE ENCOUNTER
DELETE AFTER REVIEWING: Send the encounter HIGH priority, If patient has less than a 3 day supply. If the patient will run out of medication over the weekend add that information to the additional details line. Send this encounter to the clinical pool.    Caller: Sharif Jaydapio Neves    Relationship: Self    Best call back number: [phone]    Requested Prescriptions:   Requested Prescriptions     Pending Prescriptions Disp Refills   • traZODone (DESYREL) 100 MG tablet 30 tablet 1     Sig: Take 0.5 tablets by mouth At Night As Needed for Sleep. Indications: Trouble Sleeping   • venlafaxine XR (EFFEXOR-XR) 75 MG 24 hr capsule 30 capsule 1     Sig: Take 1 capsule by mouth Daily With Breakfast. Indications: Major Depressive Disorder, Posttraumatic Stress Disorder   • Rimegepant Sulfate (Nurtec) 75 MG tablet dispersible tablet 15 tablet 2     Sig: Take 1 tablet by mouth Every Other Day.        Pharmacy where request should be sent: [facility] [address] [facility].  [phone] [facility] [phone] FX     Additional details provided by patient: PATIENT STATES SHE ONLY HAS ONE PILL LEFT. AND THE NEXT AVAILABLE APPOINTMENT WASN'T UNTIL 11/17/22. PATIENT DID TAKE THAT APPOINTMENT.    Does the patient have less than a 3 day supply:  [x] Yes  [] No    Noé Parson Rep   10/26/22 14:52 EDT

## 2022-10-26 NOTE — TELEPHONE ENCOUNTER
Caller: Jayda Sharif    Relationship: Self    Best call back number: [phone]    What form or medical record are you requesting: FORM SO THE PATIENT CAN GET HER PERMIT BECAUSE OF SEIZURES    Who is requesting this form or medical record from you: [facility]    Additional notes: PATIENT STATES SHE WILL DROP OF THE PAPERWORK TO THE OFFICE TO BE FILLED OUT ONCE SHE RECEIVES IT.

## 2022-10-27 RX ORDER — VENLAFAXINE HYDROCHLORIDE 75 MG/1
75 CAPSULE, EXTENDED RELEASE ORAL
Qty: 30 CAPSULE | Refills: 1 | Status: SHIPPED | OUTPATIENT
Start: 2022-10-27

## 2022-10-27 RX ORDER — RIMEGEPANT SULFATE 75 MG/75MG
1 TABLET, ORALLY DISINTEGRATING ORAL EVERY OTHER DAY
Qty: 15 TABLET | Refills: 2 | Status: SHIPPED | OUTPATIENT
Start: 2022-10-27

## 2022-10-27 RX ORDER — TRAZODONE HYDROCHLORIDE 100 MG/1
50 TABLET ORAL NIGHTLY PRN
Qty: 30 TABLET | Refills: 1
Start: 2022-10-27

## 2023-01-20 ENCOUNTER — TELEPHONE (OUTPATIENT)
Dept: FAMILY MEDICINE CLINIC | Facility: CLINIC | Age: 23
End: 2023-01-20
Payer: MEDICAID

## 2023-03-30 ENCOUNTER — HOSPITAL ENCOUNTER (EMERGENCY)
Facility: HOSPITAL | Age: 23
Discharge: HOME | End: 2023-03-30
Attending: EMERGENCY MEDICINE | Admitting: EMERGENCY MEDICINE
Payer: COMMERCIAL

## 2023-03-30 ENCOUNTER — APPOINTMENT (OUTPATIENT)
Dept: CT IMAGING | Facility: HOSPITAL | Age: 23
End: 2023-03-30
Payer: COMMERCIAL

## 2023-03-30 VITALS
DIASTOLIC BLOOD PRESSURE: 62 MMHG | RESPIRATION RATE: 18 BRPM | HEIGHT: 64 IN | OXYGEN SATURATION: 97 % | BODY MASS INDEX: 42.23 KG/M2 | TEMPERATURE: 99.6 F | SYSTOLIC BLOOD PRESSURE: 108 MMHG | HEART RATE: 97 BPM

## 2023-03-30 DIAGNOSIS — R10.10 PAIN OF UPPER ABDOMEN: Primary | ICD-10-CM

## 2023-03-30 DIAGNOSIS — R31.9 URINARY TRACT INFECTION WITH HEMATURIA, SITE UNSPECIFIED: ICD-10-CM

## 2023-03-30 DIAGNOSIS — N39.0 URINARY TRACT INFECTION WITH HEMATURIA, SITE UNSPECIFIED: ICD-10-CM

## 2023-03-30 LAB
ALBUMIN SERPL-MCNC: 3.9 G/DL (ref 3.5–5.2)
ALBUMIN/GLOB SERPL: 1.1 G/DL
ALP SERPL-CCNC: 89 U/L (ref 39–117)
ALT SERPL W P-5'-P-CCNC: 17 U/L (ref 1–33)
ANION GAP SERPL CALCULATED.3IONS-SCNC: 10.8 MMOL/L (ref 5–15)
AST SERPL-CCNC: 19 U/L (ref 1–32)
BACTERIA UR QL AUTO: ABNORMAL /HPF
BASOPHILS # BLD AUTO: 0.04 10*3/MM3 (ref 0–0.2)
BASOPHILS NFR BLD AUTO: 0.5 % (ref 0–1.5)
BILIRUB SERPL-MCNC: 0.5 MG/DL (ref 0–1.2)
BILIRUB UR QL STRIP: NEGATIVE
BUN SERPL-MCNC: 11 MG/DL (ref 6–20)
BUN/CREAT SERPL: 15.7 (ref 7–25)
CALCIUM SPEC-SCNC: 9 MG/DL (ref 8.6–10.5)
CHLORIDE SERPL-SCNC: 104 MMOL/L (ref 98–107)
CLARITY UR: CLEAR
CO2 SERPL-SCNC: 22.2 MMOL/L (ref 22–29)
COLOR UR: YELLOW
CREAT SERPL-MCNC: 0.7 MG/DL (ref 0.57–1)
D-LACTATE SERPL-SCNC: 0.9 MMOL/L (ref 0.5–2)
DEPRECATED RDW RBC AUTO: 41.4 FL (ref 37–54)
EGFRCR SERPLBLD CKD-EPI 2021: 124.8 ML/MIN/1.73
EOSINOPHIL # BLD AUTO: 0.27 10*3/MM3 (ref 0–0.4)
EOSINOPHIL NFR BLD AUTO: 3.2 % (ref 0.3–6.2)
ERYTHROCYTE [DISTWIDTH] IN BLOOD BY AUTOMATED COUNT: 14.9 % (ref 12.3–15.4)
GLOBULIN UR ELPH-MCNC: 3.4 GM/DL
GLUCOSE SERPL-MCNC: 109 MG/DL (ref 65–99)
GLUCOSE UR STRIP-MCNC: NEGATIVE MG/DL
HCT VFR BLD AUTO: 36.7 % (ref 34–46.6)
HGB BLD-MCNC: 12.1 G/DL (ref 12–15.9)
HGB UR QL STRIP.AUTO: NEGATIVE
HOLD SPECIMEN: NORMAL
HOLD SPECIMEN: NORMAL
HYALINE CASTS UR QL AUTO: ABNORMAL /LPF
IMM GRANULOCYTES # BLD AUTO: 0.01 10*3/MM3 (ref 0–0.05)
IMM GRANULOCYTES NFR BLD AUTO: 0.1 % (ref 0–0.5)
KETONES UR QL STRIP: NEGATIVE
LEUKOCYTE ESTERASE UR QL STRIP.AUTO: ABNORMAL
LIPASE SERPL-CCNC: 22 U/L (ref 13–60)
LYMPHOCYTES # BLD AUTO: 1.27 10*3/MM3 (ref 0.7–3.1)
LYMPHOCYTES NFR BLD AUTO: 15.2 % (ref 19.6–45.3)
MCH RBC QN AUTO: 25.2 PG (ref 26.6–33)
MCHC RBC AUTO-ENTMCNC: 33 G/DL (ref 31.5–35.7)
MCV RBC AUTO: 76.5 FL (ref 79–97)
MONOCYTES # BLD AUTO: 0.48 10*3/MM3 (ref 0.1–0.9)
MONOCYTES NFR BLD AUTO: 5.7 % (ref 5–12)
NEUTROPHILS NFR BLD AUTO: 6.28 10*3/MM3 (ref 1.7–7)
NEUTROPHILS NFR BLD AUTO: 75.3 % (ref 42.7–76)
NITRITE UR QL STRIP: NEGATIVE
NRBC BLD AUTO-RTO: 0 /100 WBC (ref 0–0.2)
PH UR STRIP.AUTO: 6 [PH] (ref 5–8)
PLATELET # BLD AUTO: 344 10*3/MM3 (ref 140–450)
PMV BLD AUTO: 9.1 FL (ref 6–12)
POTASSIUM SERPL-SCNC: 4 MMOL/L (ref 3.5–5.2)
PROT SERPL-MCNC: 7.3 G/DL (ref 6–8.5)
PROT UR QL STRIP: NEGATIVE
RBC # BLD AUTO: 4.8 10*6/MM3 (ref 3.77–5.28)
RBC # UR STRIP: ABNORMAL /HPF
REF LAB TEST METHOD: ABNORMAL
SODIUM SERPL-SCNC: 137 MMOL/L (ref 136–145)
SP GR UR STRIP: 1.03 (ref 1–1.03)
SQUAMOUS #/AREA URNS HPF: ABNORMAL /HPF
TROPONIN T SERPL HS-MCNC: <6 NG/L
UROBILINOGEN UR QL STRIP: ABNORMAL
WBC # UR STRIP: ABNORMAL /HPF
WBC NRBC COR # BLD: 8.35 10*3/MM3 (ref 3.4–10.8)
WHOLE BLOOD HOLD COAG: NORMAL
WHOLE BLOOD HOLD SPECIMEN: NORMAL

## 2023-03-30 PROCEDURE — 87040 BLOOD CULTURE FOR BACTERIA: CPT | Performed by: NURSE PRACTITIONER

## 2023-03-30 PROCEDURE — 85025 COMPLETE CBC W/AUTO DIFF WBC: CPT | Performed by: EMERGENCY MEDICINE

## 2023-03-30 PROCEDURE — 93010 ELECTROCARDIOGRAM REPORT: CPT | Performed by: INTERNAL MEDICINE

## 2023-03-30 PROCEDURE — 81001 URINALYSIS AUTO W/SCOPE: CPT | Performed by: EMERGENCY MEDICINE

## 2023-03-30 PROCEDURE — 25010000002 KETOROLAC TROMETHAMINE PER 15 MG: Performed by: NURSE PRACTITIONER

## 2023-03-30 PROCEDURE — 25010000002 ONDANSETRON PER 1 MG: Performed by: NURSE PRACTITIONER

## 2023-03-30 PROCEDURE — 96375 TX/PRO/DX INJ NEW DRUG ADDON: CPT

## 2023-03-30 PROCEDURE — 93005 ELECTROCARDIOGRAM TRACING: CPT | Performed by: EMERGENCY MEDICINE

## 2023-03-30 PROCEDURE — 96365 THER/PROPH/DIAG IV INF INIT: CPT

## 2023-03-30 PROCEDURE — 99284 EMERGENCY DEPT VISIT MOD MDM: CPT

## 2023-03-30 PROCEDURE — 87086 URINE CULTURE/COLONY COUNT: CPT | Performed by: NURSE PRACTITIONER

## 2023-03-30 PROCEDURE — 83605 ASSAY OF LACTIC ACID: CPT | Performed by: NURSE PRACTITIONER

## 2023-03-30 PROCEDURE — 25510000001 IOPAMIDOL PER 1 ML: Performed by: EMERGENCY MEDICINE

## 2023-03-30 PROCEDURE — 83690 ASSAY OF LIPASE: CPT | Performed by: EMERGENCY MEDICINE

## 2023-03-30 PROCEDURE — 74177 CT ABD & PELVIS W/CONTRAST: CPT

## 2023-03-30 PROCEDURE — 80053 COMPREHEN METABOLIC PANEL: CPT | Performed by: EMERGENCY MEDICINE

## 2023-03-30 PROCEDURE — 25010000002 LEVOFLOXACIN PER 250 MG: Performed by: NURSE PRACTITIONER

## 2023-03-30 PROCEDURE — 84484 ASSAY OF TROPONIN QUANT: CPT | Performed by: EMERGENCY MEDICINE

## 2023-03-30 PROCEDURE — 96361 HYDRATE IV INFUSION ADD-ON: CPT

## 2023-03-30 PROCEDURE — 93005 ELECTROCARDIOGRAM TRACING: CPT

## 2023-03-30 RX ORDER — FAMOTIDINE 10 MG/ML
20 INJECTION, SOLUTION INTRAVENOUS ONCE
Status: COMPLETED | OUTPATIENT
Start: 2023-03-30 | End: 2023-03-30

## 2023-03-30 RX ORDER — OMEPRAZOLE 40 MG/1
40 CAPSULE, DELAYED RELEASE ORAL DAILY
Qty: 14 CAPSULE | Refills: 0 | Status: SHIPPED | OUTPATIENT
Start: 2023-03-30

## 2023-03-30 RX ORDER — LEVOFLOXACIN 5 MG/ML
750 INJECTION, SOLUTION INTRAVENOUS ONCE
Status: COMPLETED | OUTPATIENT
Start: 2023-03-30 | End: 2023-03-30

## 2023-03-30 RX ORDER — ONDANSETRON 4 MG/1
4 TABLET, ORALLY DISINTEGRATING ORAL EVERY 8 HOURS PRN
Qty: 15 TABLET | Refills: 0 | Status: SHIPPED | OUTPATIENT
Start: 2023-03-30

## 2023-03-30 RX ORDER — DICYCLOMINE HCL 20 MG
20 TABLET ORAL EVERY 6 HOURS
Qty: 20 TABLET | Refills: 0 | Status: SHIPPED | OUTPATIENT
Start: 2023-03-30

## 2023-03-30 RX ORDER — LEVOFLOXACIN 500 MG/1
500 TABLET, FILM COATED ORAL DAILY
Qty: 7 TABLET | Refills: 0 | Status: SHIPPED | OUTPATIENT
Start: 2023-03-30 | End: 2023-04-06

## 2023-03-30 RX ORDER — SODIUM CHLORIDE 0.9 % (FLUSH) 0.9 %
10 SYRINGE (ML) INJECTION AS NEEDED
Status: DISCONTINUED | OUTPATIENT
Start: 2023-03-30 | End: 2023-03-31 | Stop reason: HOSPADM

## 2023-03-30 RX ORDER — ONDANSETRON 2 MG/ML
4 INJECTION INTRAMUSCULAR; INTRAVENOUS ONCE
Status: COMPLETED | OUTPATIENT
Start: 2023-03-30 | End: 2023-03-30

## 2023-03-30 RX ORDER — KETOROLAC TROMETHAMINE 15 MG/ML
15 INJECTION, SOLUTION INTRAMUSCULAR; INTRAVENOUS ONCE
Status: COMPLETED | OUTPATIENT
Start: 2023-03-30 | End: 2023-03-30

## 2023-03-30 RX ADMIN — SODIUM CHLORIDE 1000 ML: 9 INJECTION, SOLUTION INTRAVENOUS at 21:12

## 2023-03-30 RX ADMIN — ONDANSETRON 4 MG: 2 INJECTION INTRAMUSCULAR; INTRAVENOUS at 21:13

## 2023-03-30 RX ADMIN — FAMOTIDINE 20 MG: 10 INJECTION INTRAVENOUS at 21:13

## 2023-03-30 RX ADMIN — IOPAMIDOL 100 ML: 755 INJECTION, SOLUTION INTRAVENOUS at 21:57

## 2023-03-30 RX ADMIN — KETOROLAC TROMETHAMINE 15 MG: 15 INJECTION, SOLUTION INTRAMUSCULAR; INTRAVENOUS at 21:13

## 2023-03-30 RX ADMIN — LEVOFLOXACIN 750 MG: 5 INJECTION, SOLUTION INTRAVENOUS at 22:02

## 2023-03-30 NOTE — Clinical Note
[facility] EMERGENCY ROOM  [address]  [place] [address]  Phone: [phone]    Jayda Sharif was seen and treated in our emergency department on 3/30/2023.  She may return to work on 04/01/2023.         Thank you for choosing [facility].    Leta Cornejo APRN

## 2023-03-31 LAB
BACTERIA SPEC AEROBE CULT: NORMAL
QT INTERVAL: 314 MS

## 2023-03-31 NOTE — DISCHARGE INSTRUCTIONS
Testing here today was unremarkable for any acute findings other than urinary tract infection.    CT scan was normal and did not show any acute findings.    You have been given the first dose of antibiotic in the emergency department.    Continue medications for of infection in the urine as well as symptomatic treatment of abdominal pain intake and follow-up with your PCP.    Return for new or worsening symptoms

## 2023-03-31 NOTE — ED PROVIDER NOTES
Time: 8:59 PM EDT  Date of encounter:  3/30/2023  Independent Historian/Clinical History and Information was obtained by:   Patient  Chief Complaint: Abdominal pain    History is limited by: N/A    History of Present Illness:  Patient is a 22 y.o. year old female who presents to the emergency department for evaluation of abdominal pain      Abdominal Pain  Pain location:  Epigastric  Pain quality: aching, cramping and gnawing    Pain radiates to:  Does not radiate  Pain severity:  Moderate  Onset quality:  Gradual  Duration:  1 day  Timing:  Constant  Progression:  Unchanged  Chronicity:  New  Context comment:  No known cause.  Patient states pain started this morning and is gradually worsened  Relieved by:  Nothing  Worsened by:  Movement, palpation and eating  Ineffective treatments:  None tried  Associated symptoms: nausea    Associated symptoms: no anorexia, no belching, no chest pain, no chills, no constipation, no cough, no diarrhea, no dysuria, no fever, no hematuria, no shortness of breath, no sore throat and no vomiting        Patient Care Team  Primary Care Provider: Provider, No Known    Past Medical History:     Allergies   Allergen Reactions   • Molds & Smuts Cough, Dizziness, Headache, Nausea And Vomiting, Shortness Of Breath and Swelling   • Penicillins Swelling     Unsure if throat swells. Just knows it caused swelling and rash.  Happened as a baby Hives       Past Medical History:   Diagnosis Date   • Allergic Not Sure   • Allergies    • Anemia    • Anxiety    • Asthma    • Cholelithiasis 2020   • Class 3 severe obesity due to excess calories with serious comorbidity and body mass index (BMI) of 40.0 to 44.9 in adult ([facility]) 07/26/2021   • Depression    • Diabetes mellitus ([facility])    • GERD (gastroesophageal reflux disease)    • Low blood pressure    • Migraine headache    • Nonfunctioning gallbladder    • Seizures ([facility]) 2021   • Sleep apnea    • SOB (shortness of breath)      Past Surgical History:    Procedure Laterality Date   • CHOLECYSTECTOMY N/A 9/21/2020    Procedure: CHOLECYSTECTOMY LAPAROSCOPIC;  Surgeon: Saurabh Guerrero MD;  Location: [facility];  Service: General;  Laterality: N/A;     Family History   Problem Relation Age of Onset   • Hypertension Mother    • Diabetes Mother    • Cancer Mother    • Depression Mother    • Miscarriages / Stillbirths Mother    • No Known Problems Father    • Cancer Maternal Aunt    • Cancer Maternal Grandmother    • Mental illness Maternal Grandmother    • Asthma Sister    • Miscarriages / Stillbirths Sister    • Alcohol abuse Maternal Uncle        Home Medications:  Prior to Admission medications    Medication Sig Start Date End Date Taking? Authorizing Provider   hydrOXYzine (ATARAX) 50 MG tablet Take 1 tablet by mouth 2 (Two) Times a Day As Needed for Anxiety. Indications: Feeling Anxious 2/12/22   Patrice Alicia MD   Rimegepant Sulfate (Nurtec) 75 MG tablet dispersible tablet Take 1 tablet by mouth Every Other Day. 10/27/22   Dasha Jhaveri DO   traZODone (DESYREL) 100 MG tablet Take 0.5 tablets by mouth At Night As Needed for Sleep. Indications: Trouble Sleeping 10/27/22   Dasha Jhaveri DO   venlafaxine XR (EFFEXOR-XR) 75 MG 24 hr capsule Take 1 capsule by mouth Daily With Breakfast. Indications: Major Depressive Disorder, Posttraumatic Stress Disorder 10/27/22   Dasha Jhaveri DO        Social History:   Social History     Tobacco Use   • Smoking status: Some Days     Packs/day: 0.50     Years: 4.00     Pack years: 2.00     Types: Cigarettes, Electronic Cigarette     Start date: 2018   • Smokeless tobacco: Never   Vaping Use   • Vaping Use: Every day   • Substances: Nicotine   • Devices: Disposable   Substance Use Topics   • Alcohol use: Never     Types: 2 Standard drinks or equivalent per week     Comment: occasional   • Drug use: Never         Review of Systems:  Review of Systems   Constitutional: Negative for chills and fever.   HENT: Negative for congestion,  "ear pain and sore throat.    Eyes: Negative for pain.   Respiratory: Negative for cough, chest tightness and shortness of breath.    Cardiovascular: Negative for chest pain.   Gastrointestinal: Positive for abdominal pain and nausea. Negative for anorexia, constipation, diarrhea and vomiting.   Genitourinary: Negative for dysuria, flank pain and hematuria.   Musculoskeletal: Negative for back pain, joint swelling and neck pain.   Skin: Negative for pallor.   Neurological: Negative for seizures and headaches.   Hematological: Negative.    Psychiatric/Behavioral: Negative.    All other systems reviewed and are negative.       Physical Exam:  /62   Pulse 97   Temp 99.6 °F (37.6 °C) (Oral)   Resp 18   Ht 162.6 cm (64\")   LMP 03/27/2023   SpO2 97%   BMI 42.23 kg/m²     Physical Exam  Vitals and nursing note reviewed.   Constitutional:       General: She is not in acute distress.     Appearance: Normal appearance. She is not toxic-appearing.   HENT:      Head: Normocephalic and atraumatic.      Mouth/Throat:      Mouth: Mucous membranes are moist.   Eyes:      General: No scleral icterus.  Cardiovascular:      Rate and Rhythm: Normal rate and regular rhythm.      Pulses: Normal pulses.      Heart sounds: Normal heart sounds.   Pulmonary:      Effort: Pulmonary effort is normal. No respiratory distress.      Breath sounds: Normal breath sounds.   Abdominal:      General: Bowel sounds are normal. There is no distension.      Palpations: Abdomen is soft.      Tenderness: There is abdominal tenderness in the epigastric area. There is no right CVA tenderness or left CVA tenderness.      Hernia: No hernia is present.   Musculoskeletal:         General: Normal range of motion.      Cervical back: Normal range of motion and neck supple.   Skin:     General: Skin is warm and dry.   Neurological:      Mental Status: She is alert and oriented to person, place, and time. Mental status is at baseline.   Psychiatric:       "   Mood and Affect: Mood normal.         Behavior: Behavior normal.          Procedures:  Procedures      Medical Decision Making:      Comorbidities that affect care:    Asthma, Diabetes, Smoking    External Notes reviewed:    None      The following orders were placed and all results were independently analyzed by me:  Orders Placed This Encounter   Procedures   • Blood Culture - Blood,   • Blood Culture - Blood,   • Urine Culture - Urine, Urine, Clean Catch   • CT Abdomen Pelvis With Contrast   • [place] Draw   • Comprehensive Metabolic Panel   • Lipase   • Single High Sensitivity Troponin T   • Urinalysis With Microscopic If Indicated (No Culture) - Urine, Clean Catch   • CBC Auto Differential   • Urinalysis, Microscopic Only - Urine, Clean Catch   • Lactic Acid, Plasma   • Undress & Gown   • Continuous Pulse Oximetry   • Vital Signs   • ECG 12 Lead ED Triage Standing Order; Abdominal Pain (Upper)   • CBC & Differential   • Green Top (Gel)   • Lavender Top   • Gold Top - SST   • Light Blue Top       Medications Given in the Emergency Department:  Medications   sodium chloride 0.9 % bolus 1,000 mL (0 mL Intravenous Stopped 3/30/23 2204)   famotidine (PEPCID) injection 20 mg (20 mg Intravenous Given 3/30/23 2113)   ondansetron (ZOFRAN) injection 4 mg (4 mg Intravenous Given 3/30/23 2113)   ketorolac (TORADOL) injection 15 mg (15 mg Intravenous Given 3/30/23 2113)   levoFLOXacin (LEVAQUIN) 750 mg/150 mL D5W (premix) (LEVAQUIN) 750 mg (0 mg Intravenous Stopped 3/30/23 2326)   iopamidol (ISOVUE-370) 76 % injection 100 mL (100 mL Intravenous Given 3/30/23 2157)        ED Course:    ED Course as of 03/31/23 0658   u Mar 30, 2023   2222 CT Abdomen Pelvis With Contrast  No acute findings   [DS]   2222 ECG 12 Lead ED Triage Standing Order; Abdominal Pain (Upper)  Tachycardia heart rate 110 [DS]      ED Course User Index  [DS] Leta Cornejo APRN       Labs:    Lab Results (last 24 hours)     Procedure Component Value  Units Date/Time    CBC & Differential [[id number]]  (Abnormal) Collected: 03/30/23 2037    Specimen: Blood Updated: 03/30/23 2043    Narrative:      The following orders were created for panel order CBC & Differential.  Procedure                               Abnormality         Status                     ---------                               -----------         ------                     CBC Auto Differential[[id number]]        Abnormal            Final result                 Please view results for these tests on the individual orders.    Comprehensive Metabolic Panel [[id number]]  (Abnormal) Collected: 03/30/23 2037    Specimen: Blood Updated: 03/30/23 2115     Glucose 109 mg/dL      BUN 11 mg/dL      Creatinine 0.70 mg/dL      Sodium 137 mmol/L      Potassium 4.0 mmol/L      Comment: Slight hemolysis detected by analyzer. Results may be affected.        Chloride 104 mmol/L      CO2 22.2 mmol/L      Calcium 9.0 mg/dL      Total Protein 7.3 g/dL      Albumin 3.9 g/dL      ALT (SGPT) 17 U/L      AST (SGOT) 19 U/L      Comment: Slight hemolysis detected by analyzer. Results may be affected.        Alkaline Phosphatase 89 U/L      Total Bilirubin 0.5 mg/dL      Globulin 3.4 gm/dL      A/G Ratio 1.1 g/dL      BUN/Creatinine Ratio 15.7     Anion Gap 10.8 mmol/L      eGFR 124.8 mL/min/1.73     Narrative:      GFR Normal >60  Chronic Kidney Disease <60  Kidney Failure <15      Lipase [[id number]]  (Normal) Collected: 03/30/23 2037    Specimen: Blood Updated: 03/30/23 2108     Lipase 22 U/L     Single High Sensitivity Troponin T [[id number]]  (Normal) Collected: 03/30/23 2037    Specimen: Blood Updated: 03/30/23 2108     HS Troponin T <6 ng/L     Narrative:      High Sensitive Troponin T Reference Range:  <10.0 ng/L- Negative Female for AMI  <15.0 ng/L- Negative Male for AMI  >=10 - Abnormal Female indicating possible myocardial injury.  >=15 - Abnormal Male indicating possible myocardial injury.   Clinicians would have to  utilize clinical acumen, EKG, Troponin, and serial changes to determine if it is an Acute Myocardial Infarction or myocardial injury due to an underlying chronic condition.         CBC Auto Differential [[id number]]  (Abnormal) Collected: 03/30/23 2037    Specimen: Blood Updated: 03/30/23 2043     WBC 8.35 10*3/mm3      RBC 4.80 10*6/mm3      Hemoglobin 12.1 g/dL      Hematocrit 36.7 %      MCV 76.5 fL      MCH 25.2 pg      MCHC 33.0 g/dL      RDW 14.9 %      RDW-SD 41.4 fl      MPV 9.1 fL      Platelets 344 10*3/mm3      Neutrophil % 75.3 %      Lymphocyte % 15.2 %      Monocyte % 5.7 %      Eosinophil % 3.2 %      Basophil % 0.5 %      Immature Grans % 0.1 %      Neutrophils, Absolute 6.28 10*3/mm3      Lymphocytes, Absolute 1.27 10*3/mm3      Monocytes, Absolute 0.48 10*3/mm3      Eosinophils, Absolute 0.27 10*3/mm3      Basophils, Absolute 0.04 10*3/mm3      Immature Grans, Absolute 0.01 10*3/mm3      nRBC 0.0 /100 WBC     Urinalysis With Microscopic If Indicated (No Culture) - Urine, Clean Catch [[id number]]  (Abnormal) Collected: 03/30/23 2110    Specimen: Urine, Clean Catch Updated: 03/30/23 2122     Color, UA Yellow     Appearance, UA Clear     pH, UA 6.0     Specific Gravity, UA 1.028     Glucose, UA Negative     Ketones, UA Negative     Bilirubin, UA Negative     Blood, UA Negative     Protein, UA Negative     Leuk Esterase, UA Moderate (2+)     Nitrite, UA Negative     Urobilinogen, UA 1.0 E.U./dL    Urinalysis, Microscopic Only - Urine, Clean Catch [[id number]]  (Abnormal) Collected: 03/30/23 2110    Specimen: Urine, Clean Catch Updated: 03/30/23 2122     RBC, UA 3-5 /HPF      WBC, UA 31-50 /HPF      Bacteria, UA 1+ /HPF      Squamous Epithelial Cells, UA 3-6 /HPF      Hyaline Casts, UA 7-12 /LPF      Methodology Automated Microscopy    Urine Culture - Urine, Urine, Clean Catch [[id number]] Collected: 03/30/23 2110    Specimen: Urine, Clean Catch Updated: 03/30/23 2143    Lactic Acid, Plasma [[id number]]   (Normal) Collected: 03/30/23 2147    Specimen: Blood Updated: 03/30/23 2226     Lactate 0.9 mmol/L     Blood Culture - Blood, Arm, Left [[id number]] Collected: 03/30/23 2147    Specimen: Blood from Arm, Left Updated: 03/30/23 2157    Blood Culture - Blood, Arm, Left [[id number]] Collected: 03/30/23 2147    Specimen: Blood from Arm, Left Updated: 03/30/23 2158           Imaging:    CT Abdomen Pelvis With Contrast    Result Date: 3/30/2023  PROCEDURE: CT ABDOMEN PELVIS W CONTRAST  COMPARISON: [facility], CT, CT ABDOMEN PELVIS W CONTRAST, 10/21/2021, 17:57.  INDICATIONS: RIGHT LOWER QUADRANT, RIGHT LOWER BQCK PAIN X 1 DAY  TECHNIQUE: After obtaining the patient's consent, CT images were created with non-ionic intravenous contrast material.   PROTOCOL:   Standard imaging protocol performed    RADIATION:   DLP: 1178.7 mGy*cm   Automated exposure control was utilized to minimize radiation dose. CONTRAST: 100 cc Isovue 370 I.V.  FINDINGS:  Within the lung bases is a calcified granuloma in the left lower lobe.  The liver, adrenal glands, kidneys, spleen, and pancreas are unremarkable.  The gallbladder is surgically absent.   The stomach appears normal.  The small bowel appears normal in caliber and configuration.  The colon appears normal.  The appendix appears normal.  There is no ascites or loculated collection.  No abnormally enlarged lymph nodes are identified.  The rectum, uterus and adnexa, and urinary bladder are unremarkable.  No aggressive osseous lesions are identified.       No acute process identified within abdomen/pelvis.     IVETTE HICKS MD       Electronically Signed and Approved By: IVETTE HICKS MD on 3/30/2023 at 22:10                 Differential Diagnosis and Discussion:    Abdominal Pain: Based on the patient's signs and symptoms, I considered abdominal aortic aneurysm, small bowel obstruction, pancreatitis, acute cholecystitis, acute appendecitis, peptic ulcer disease, gastritis,  colitis, endocrine disorders, irritable bowel syndrome and other differential diagnosis an etiology of the patient's abdominal pain.    All labs were reviewed and interpreted by me.  CT scan radiology interpretation was reviewed by me.    MDM  Number of Diagnoses or Management Options  Pain of upper abdomen  Urinary tract infection with hematuria, site unspecified  Diagnosis management comments: The patient is resting comfortably and feels better, is alert and in no distress. Repeat examination is unremarkable and benign; in particular, there's no discomfort at McBurney's point and there is no pulsatile mass. The history, exam, diagnostic testing, and current condition does not suggest acute appendicitis, bowel obstruction, acute cholecystitis, bowel perforation, major gastrointestinal bleeding, severe diverticulitis, abdominal aortic aneurysm, mesenteric ischemia, volvulus, sepsis, or other significant pathology that warrants further testing, continued ED treatment, admission, for surgical evaluation at this point. The vital signs have been stable. The patient does not have uncontrollable pain, intractable vomiting, or other significant symptoms. The patient's condition is stable and appropriate for discharge from the emergency department.         Amount and/or Complexity of Data Reviewed  Clinical lab tests: reviewed and ordered  Tests in the radiology section of CPT®: reviewed and ordered  Tests in the medicine section of CPT®: reviewed and ordered    Risk of Complications, Morbidity, and/or Mortality  Presenting problems: moderate  Diagnostic procedures: moderate  Management options: low    Patient Progress  Patient progress: stable         Patient Care Considerations:    NARCOTICS: I considered prescribing opiate pain medication as an outpatient, however Medications for gastric symptoms are more appropriate treatment of UTI      Consultants/Shared Management Plan:    None    Social Determinants of  Health:    Patient is independent, reliable, and has access to care.       Disposition and Care Coordination:    Discharged: I considered escalation of care by admitting this patient for observation, however the patient has improved and is suitable and  stable for discharge.    I have explained the patient´s condition, diagnoses and treatment plan based on the information available to me at this time. I have answered questions and addressed any concerns. The patient has a good  understanding of the patient´s diagnosis, condition, and treatment plan as can be expected at this point. The vital signs have been stable. The patient´s condition is stable and appropriate for discharge from the emergency department.      The patient will pursue further outpatient evaluation with the primary care physician or other designated or consulting physician as outlined in the discharge instructions. They are agreeable to this plan of care and follow-up instructions have been explained in detail. The patient has received these instructions in written format and have expressed an understanding of the discharge instructions. The patient is aware that any significant change in condition or worsening of symptoms should prompt an immediate return to this or the closest emergency department or call to 911.  I have explained discharge medications and the need for follow up with the patient/caretakers. This was also printed in the discharge instructions. Patient was discharged with the following medications and follow up:      Medication List      New Prescriptions    dicyclomine 20 MG tablet  Commonly known as: BENTYL  Take 1 tablet by mouth Every 6 (Six) Hours.     levoFLOXacin 500 MG tablet  Commonly known as: LEVAQUIN  Take 1 tablet by mouth Daily for 7 days.     omeprazole 40 MG capsule  Commonly known as: priLOSEC  Take 1 capsule by mouth Daily.     ondansetron ODT 4 MG disintegrating tablet  Commonly known as: ZOFRAN-ODT  Place 1  tablet on the tongue Every 8 (Eight) Hours As Needed for Nausea or Vomiting.           Where to Get Your Medications      These medications were sent to [facility] - [place], [place] - [address] - [phone]  - [phone]   [address] [facility] [address]    Phone: [phone]   · dicyclomine 20 MG tablet  · levoFLOXacin 500 MG tablet  · omeprazole 40 MG capsule  · ondansetron ODT 4 MG disintegrating tablet      Provider, No Known  [facility] [id number]      As needed       Final diagnoses:   Pain of upper abdomen   Urinary tract infection with hematuria, site unspecified        ED Disposition     ED Disposition   Discharge    Condition   Stable    Comment   --             This medical record created using voice recognition software.           Leta Cornejo, APRN  03/31/23 0707

## 2023-04-04 LAB
BACTERIA SPEC AEROBE CULT: NORMAL
BACTERIA SPEC AEROBE CULT: NORMAL

## 2023-04-13 ENCOUNTER — TELEPHONE (OUTPATIENT)
Dept: FAMILY MEDICINE CLINIC | Facility: CLINIC | Age: 23
End: 2023-04-13
Payer: MEDICAID

## 2023-04-13 NOTE — TELEPHONE ENCOUNTER
Hub staff attempted to follow warm transfer process and was unsuccessful     Caller: Jayda Sharif    Relationship to patient: Self    Best call back number: [phone]       PATIENT RETURNING PHONE CALL TO OFFICE     PLEASE ADVISE

## 2023-05-19 ENCOUNTER — OFFICE VISIT (OUTPATIENT)
Dept: FAMILY MEDICINE CLINIC | Facility: CLINIC | Age: 23
End: 2023-05-19

## 2023-05-19 VITALS
BODY MASS INDEX: 47.46 KG/M2 | SYSTOLIC BLOOD PRESSURE: 126 MMHG | WEIGHT: 278 LBS | DIASTOLIC BLOOD PRESSURE: 78 MMHG | HEART RATE: 100 BPM | TEMPERATURE: 98 F | OXYGEN SATURATION: 99 % | RESPIRATION RATE: 20 BRPM | HEIGHT: 64 IN

## 2023-05-19 DIAGNOSIS — Z87.898 HISTORY OF SEIZURE: Primary | Chronic | ICD-10-CM

## 2023-05-19 DIAGNOSIS — F41.1 GAD (GENERALIZED ANXIETY DISORDER): Chronic | ICD-10-CM

## 2023-05-19 DIAGNOSIS — E66.01 CLASS 3 SEVERE OBESITY DUE TO EXCESS CALORIES WITH SERIOUS COMORBIDITY AND BODY MASS INDEX (BMI) OF 40.0 TO 44.9 IN ADULT: Chronic | ICD-10-CM

## 2023-05-31 PROBLEM — Z87.898 HISTORY OF SEIZURE: Status: ACTIVE | Noted: 2023-05-31

## 2023-05-31 PROBLEM — N91.2 AMENORRHEA: Status: ACTIVE | Noted: 2021-01-13

## 2023-05-31 PROBLEM — E28.2 POLYCYSTIC OVARY SYNDROME: Status: ACTIVE | Noted: 2021-01-13

## 2023-05-31 PROBLEM — N92.1 MENORRHAGIA WITH IRREGULAR CYCLE: Status: ACTIVE | Noted: 2020-05-11

## 2023-05-31 PROBLEM — Z87.898 HISTORY OF SEIZURE: Chronic | Status: ACTIVE | Noted: 2023-05-31

## 2023-05-31 PROBLEM — A59.9 TRICHOMONIASIS: Status: ACTIVE | Noted: 2021-02-03

## 2023-05-31 NOTE — ASSESSMENT & PLAN NOTE
Patient's (Body mass index is 47.72 kg/m².) indicates that they are morbidly/severely obese (BMI > 40 or > 35 with obesity - related health condition) with health conditions that include none . Weight is unchanged. BMI  is above average; BMI management plan is completed. We discussed low calorie, low carb based diet program, portion control and increasing exercise.

## 2023-05-31 NOTE — PROGRESS NOTES
"Chief Complaint  Seizures (Patient wants to get drivers permit ppw completed.)    Subjective        Jayda Sharif presents to [facility]  History of Present Illness  She is here today for management of her chronic medical conditions.  She has type 2 diabetes, anxiety, migraine headache, morbid obesity and iron deficiency anemia.     A year ago she had what was suspected to be seizure. She has not had any seizure like activity since. She was wanting to get her drives license back.       The patient has no other complaints today and denies chest pain, shortness of breath, weakness, numbness, dizziness or syncopal event.      Objective   Vital Signs:  /78 (BP Location: Left arm, Patient Position: Sitting, Cuff Size: Adult)   Pulse 100   Temp 98 °F (36.7 °C) (Temporal)   Resp 20   Ht 162.6 cm (64\")   Wt 126 kg (278 lb)   SpO2 99%   BMI 47.72 kg/m²   Estimated body mass index is 47.72 kg/m² as calculated from the following:    Height as of this encounter: 162.6 cm (64\").    Weight as of this encounter: 126 kg (278 lb).             Physical Exam  Vitals reviewed.   Constitutional:       Appearance: Normal appearance. She is well-developed. She is morbidly obese.   HENT:      Head: Normocephalic and atraumatic.      Right Ear: External ear normal.      Left Ear: External ear normal.      Mouth/Throat:      Pharynx: No oropharyngeal exudate.   Eyes:      Conjunctiva/sclera: Conjunctivae normal.      Pupils: Pupils are equal, round, and reactive to light.   Neck:      Vascular: No carotid bruit.   Cardiovascular:      Rate and Rhythm: Normal rate and regular rhythm.      Heart sounds: No murmur heard.    No friction rub. No gallop.   Pulmonary:      Effort: Pulmonary effort is normal.      Breath sounds: Normal breath sounds. No wheezing or rhonchi.   Abdominal:      General: There is no distension.   Skin:     General: Skin is warm and dry.   Neurological:      Mental " Status: She is alert and oriented to person, place, and time.      Cranial Nerves: No cranial nerve deficit.      Motor: No weakness.   Psychiatric:         Mood and Affect: Mood and affect normal.         Behavior: Behavior normal.         Thought Content: Thought content normal.         Judgment: Judgment normal.        Result Review :    CMP        6/13/2022    00:21 3/30/2023    20:37   CMP   Glucose 98   109     BUN 7   11     Creatinine 0.69   0.70     EGFR 126.0   124.8     Sodium 139   137     Potassium 3.2   4.0     Chloride 103   104     Calcium 8.9   9.0     Total Protein 7.1   7.3     Albumin 3.80   3.9     Globulin 3.3   3.4     Total Bilirubin 0.5   0.5     Alkaline Phosphatase 83   89     AST (SGOT) 19   19     ALT (SGPT) 21   17     Albumin/Globulin Ratio 1.2   1.1     BUN/Creatinine Ratio 10.1   15.7     Anion Gap 11.4   10.8       CBC        6/13/2022    00:21 3/30/2023    20:37   CBC   WBC 7.33   8.35     RBC 5.09   4.80     Hemoglobin 12.8   12.1     Hematocrit 38.7   36.7     MCV 76.0   76.5     MCH 25.1   25.2     MCHC 33.1   33.0     RDW 14.6   14.9     Platelets 313   344                            Assessment and Plan   Diagnoses and all orders for this visit:    1. History of seizure (Primary)  Assessment & Plan:  She has not had any seizure like activity in the past year. It may have been a complex migraine. We will continue to monitor her moving forward. She is not on any anti-seizure medication at this time.       2. Class 3 severe obesity due to excess calories with serious comorbidity and body mass index (BMI) of 40.0 to 44.9 in adult  Assessment & Plan:  Patient's (Body mass index is 47.72 kg/m².) indicates that they are morbidly/severely obese (BMI > 40 or > 35 with obesity - related health condition) with health conditions that include none . Weight is unchanged. BMI  is above average; BMI management plan is completed. We discussed low calorie, low carb based diet program, portion  control and increasing exercise.       3. GEMMA (generalized anxiety disorder)  Assessment & Plan:  Psychological condition is improving with treatment.  Continue current treatment regimen.  Regular aerobic exercise.  Psychological condition  will be reassessed at the next regular appointment.             Follow Up   Return in about 6 months (around 11/19/2023).  Patient was given instructions and counseling regarding her condition or for health maintenance advice. Please see specific information pulled into the AVS if appropriate.

## 2023-05-31 NOTE — ASSESSMENT & PLAN NOTE
She has not had any seizure like activity in the past year. It may have been a complex migraine. We will continue to monitor her moving forward. She is not on any anti-seizure medication at this time.

## 2023-08-18 ENCOUNTER — TELEPHONE (OUTPATIENT)
Dept: FAMILY MEDICINE CLINIC | Facility: CLINIC | Age: 23
End: 2023-08-18
Payer: MEDICAID

## 2023-08-18 NOTE — TELEPHONE ENCOUNTER
Paperwork was faxed to number provided.  Patient is aware this was sent, she will follow up on her end.

## 2023-08-18 NOTE — TELEPHONE ENCOUNTER
Caller: Kole Jayda Pura    Relationship: Self    Best call back number: [phone]     What form or medical record are you requesting: PLEASE SEE FORM DONE ON 5.19.2023    Who is requesting this form or medical record from you: [place] [employer]     How would you like to receive the form or medical records (pick-up, mail, fax): [phone]    Timeframe paperwork needed: AS SOON AS POSSIBLE    Additional notes: PATIENT STATES SHE WAS ADVISED BY THE [place] [employer] THAT THEY NEVER RECEIVED FAX IN MAY. PATIENT IS REQUESTING THAT IT BE RESENT WITH CURRENT DATE AND SIGNATURE BY PROVIDER. PLEASE SEE DOCUMENTS IN MEDIA. ALSO PLEASE ADVISE PATIENT ONCE DONE.

## 2024-09-19 ENCOUNTER — LAB (OUTPATIENT)
Dept: LAB | Facility: HOSPITAL | Age: 24
End: 2024-09-19
Payer: COMMERCIAL

## 2024-09-19 ENCOUNTER — OFFICE VISIT (OUTPATIENT)
Dept: FAMILY MEDICINE CLINIC | Facility: CLINIC | Age: 24
End: 2024-09-19
Payer: MEDICAID

## 2024-09-19 ENCOUNTER — HOSPITAL ENCOUNTER (OUTPATIENT)
Dept: GENERAL RADIOLOGY | Facility: HOSPITAL | Age: 24
Discharge: HOME | End: 2024-09-19
Payer: COMMERCIAL

## 2024-09-19 VITALS
HEIGHT: 64 IN | HEART RATE: 104 BPM | SYSTOLIC BLOOD PRESSURE: 132 MMHG | BODY MASS INDEX: 47.63 KG/M2 | DIASTOLIC BLOOD PRESSURE: 86 MMHG | OXYGEN SATURATION: 100 % | WEIGHT: 279 LBS | TEMPERATURE: 97.8 F

## 2024-09-19 DIAGNOSIS — Z13.29 SCREENING FOR THYROID DISORDER: ICD-10-CM

## 2024-09-19 DIAGNOSIS — G89.29 CHRONIC PAIN OF RIGHT KNEE: ICD-10-CM

## 2024-09-19 DIAGNOSIS — M25.561 CHRONIC PAIN OF RIGHT KNEE: ICD-10-CM

## 2024-09-19 DIAGNOSIS — Z00.00 ANNUAL PHYSICAL EXAM: ICD-10-CM

## 2024-09-19 DIAGNOSIS — E66.01 CLASS 3 SEVERE OBESITY DUE TO EXCESS CALORIES WITH SERIOUS COMORBIDITY AND BODY MASS INDEX (BMI) OF 40.0 TO 44.9 IN ADULT: Chronic | ICD-10-CM

## 2024-09-19 DIAGNOSIS — F32.A DEPRESSION, UNSPECIFIED DEPRESSION TYPE: ICD-10-CM

## 2024-09-19 DIAGNOSIS — G47.00 INSOMNIA, UNSPECIFIED TYPE: ICD-10-CM

## 2024-09-19 DIAGNOSIS — Z00.00 ANNUAL PHYSICAL EXAM: Primary | ICD-10-CM

## 2024-09-19 DIAGNOSIS — Z11.59 NEED FOR HEPATITIS C SCREENING TEST: ICD-10-CM

## 2024-09-19 DIAGNOSIS — G43.901 MIGRAINE WITH STATUS MIGRAINOSUS, NOT INTRACTABLE, UNSPECIFIED MIGRAINE TYPE: ICD-10-CM

## 2024-09-19 DIAGNOSIS — Z13.220 SCREENING FOR LIPID DISORDERS: ICD-10-CM

## 2024-09-19 LAB
ALBUMIN SERPL-MCNC: 4.3 G/DL (ref 3.5–5.2)
ALBUMIN/GLOB SERPL: 1.2 G/DL
ALP SERPL-CCNC: 96 U/L (ref 39–117)
ALT SERPL W P-5'-P-CCNC: 22 U/L (ref 1–33)
ANION GAP SERPL CALCULATED.3IONS-SCNC: 11.7 MMOL/L (ref 5–15)
AST SERPL-CCNC: 19 U/L (ref 1–32)
BASOPHILS # BLD AUTO: 0.08 10*3/MM3 (ref 0–0.2)
BASOPHILS NFR BLD AUTO: 0.8 % (ref 0–1.5)
BILIRUB SERPL-MCNC: 0.4 MG/DL (ref 0–1.2)
BUN SERPL-MCNC: 9 MG/DL (ref 6–20)
BUN/CREAT SERPL: 11.8 (ref 7–25)
CALCIUM SPEC-SCNC: 9.8 MG/DL (ref 8.6–10.5)
CHLORIDE SERPL-SCNC: 100 MMOL/L (ref 98–107)
CHOLEST SERPL-MCNC: 155 MG/DL (ref 0–200)
CO2 SERPL-SCNC: 26.3 MMOL/L (ref 22–29)
CREAT SERPL-MCNC: 0.76 MG/DL (ref 0.57–1)
DEPRECATED RDW RBC AUTO: 39.4 FL (ref 37–54)
EGFRCR SERPLBLD CKD-EPI 2021: 112.4 ML/MIN/1.73
EOSINOPHIL # BLD AUTO: 0.15 10*3/MM3 (ref 0–0.4)
EOSINOPHIL NFR BLD AUTO: 1.4 % (ref 0.3–6.2)
ERYTHROCYTE [DISTWIDTH] IN BLOOD BY AUTOMATED COUNT: 13.8 % (ref 12.3–15.4)
GLOBULIN UR ELPH-MCNC: 3.5 GM/DL
GLUCOSE SERPL-MCNC: 88 MG/DL (ref 65–99)
HCT VFR BLD AUTO: 40 % (ref 34–46.6)
HCV AB SER QL: NORMAL
HDLC SERPL-MCNC: 47 MG/DL (ref 40–60)
HGB BLD-MCNC: 13.1 G/DL (ref 12–15.9)
IMM GRANULOCYTES # BLD AUTO: 0.03 10*3/MM3 (ref 0–0.05)
IMM GRANULOCYTES NFR BLD AUTO: 0.3 % (ref 0–0.5)
LDLC SERPL CALC-MCNC: 92 MG/DL (ref 0–100)
LDLC/HDLC SERPL: 1.94 {RATIO}
LYMPHOCYTES # BLD AUTO: 2.34 10*3/MM3 (ref 0.7–3.1)
LYMPHOCYTES NFR BLD AUTO: 22.3 % (ref 19.6–45.3)
MCH RBC QN AUTO: 25.9 PG (ref 26.6–33)
MCHC RBC AUTO-ENTMCNC: 32.8 G/DL (ref 31.5–35.7)
MCV RBC AUTO: 79.1 FL (ref 79–97)
MONOCYTES # BLD AUTO: 0.47 10*3/MM3 (ref 0.1–0.9)
MONOCYTES NFR BLD AUTO: 4.5 % (ref 5–12)
NEUTROPHILS NFR BLD AUTO: 7.4 10*3/MM3 (ref 1.7–7)
NEUTROPHILS NFR BLD AUTO: 70.7 % (ref 42.7–76)
NRBC BLD AUTO-RTO: 0 /100 WBC (ref 0–0.2)
PLATELET # BLD AUTO: 442 10*3/MM3 (ref 140–450)
PMV BLD AUTO: 9.8 FL (ref 6–12)
POTASSIUM SERPL-SCNC: 3.8 MMOL/L (ref 3.5–5.2)
PROT SERPL-MCNC: 7.8 G/DL (ref 6–8.5)
RBC # BLD AUTO: 5.06 10*6/MM3 (ref 3.77–5.28)
SODIUM SERPL-SCNC: 138 MMOL/L (ref 136–145)
TRIGL SERPL-MCNC: 85 MG/DL (ref 0–150)
TSH SERPL DL<=0.05 MIU/L-ACNC: 2.03 UIU/ML (ref 0.27–4.2)
VLDLC SERPL-MCNC: 16 MG/DL (ref 5–40)
WBC NRBC COR # BLD AUTO: 10.47 10*3/MM3 (ref 3.4–10.8)

## 2024-09-19 PROCEDURE — 36415 COLL VENOUS BLD VENIPUNCTURE: CPT

## 2024-09-19 PROCEDURE — 80050 GENERAL HEALTH PANEL: CPT

## 2024-09-19 PROCEDURE — 99395 PREV VISIT EST AGE 18-39: CPT

## 2024-09-19 PROCEDURE — 73562 X-RAY EXAM OF KNEE 3: CPT

## 2024-09-19 PROCEDURE — 1125F AMNT PAIN NOTED PAIN PRSNT: CPT

## 2024-09-19 PROCEDURE — 80061 LIPID PANEL: CPT

## 2024-09-19 PROCEDURE — 86803 HEPATITIS C AB TEST: CPT

## 2024-09-19 PROCEDURE — 2014F MENTAL STATUS ASSESS: CPT

## 2024-09-19 RX ORDER — VENLAFAXINE HYDROCHLORIDE 75 MG/1
75 CAPSULE, EXTENDED RELEASE ORAL
Qty: 30 CAPSULE | Refills: 0 | Status: SHIPPED | OUTPATIENT
Start: 2024-09-19

## 2024-09-19 RX ORDER — RIMEGEPANT SULFATE 75 MG/75MG
1 TABLET, ORALLY DISINTEGRATING ORAL EVERY OTHER DAY
Qty: 15 TABLET | Refills: 0 | Status: SHIPPED | OUTPATIENT
Start: 2024-09-19

## 2024-09-19 RX ORDER — IBUPROFEN 800 MG/1
1 TABLET, FILM COATED ORAL 3 TIMES DAILY
COMMUNITY
Start: 2024-08-06

## 2024-09-19 RX ORDER — TRAZODONE HYDROCHLORIDE 100 MG/1
50 TABLET ORAL NIGHTLY PRN
Qty: 30 TABLET | Refills: 0 | Status: SHIPPED | OUTPATIENT
Start: 2024-09-19

## 2024-10-04 DIAGNOSIS — G89.29 CHRONIC PAIN OF RIGHT KNEE: Primary | ICD-10-CM

## 2024-10-04 DIAGNOSIS — M25.561 CHRONIC PAIN OF RIGHT KNEE: Primary | ICD-10-CM

## 2025-02-10 ENCOUNTER — OFFICE VISIT (OUTPATIENT)
Dept: FAMILY MEDICINE CLINIC | Facility: CLINIC | Age: 25
End: 2025-02-10
Payer: MEDICAID

## 2025-02-10 VITALS
WEIGHT: 283.8 LBS | TEMPERATURE: 98.6 F | RESPIRATION RATE: 17 BRPM | BODY MASS INDEX: 48.45 KG/M2 | HEIGHT: 64 IN | SYSTOLIC BLOOD PRESSURE: 106 MMHG | HEART RATE: 96 BPM | DIASTOLIC BLOOD PRESSURE: 68 MMHG | OXYGEN SATURATION: 100 %

## 2025-02-10 DIAGNOSIS — Z87.898 HISTORY OF SEIZURE: Chronic | ICD-10-CM

## 2025-02-10 DIAGNOSIS — H60.502 ACUTE OTITIS EXTERNA OF LEFT EAR, UNSPECIFIED TYPE: ICD-10-CM

## 2025-02-10 DIAGNOSIS — J30.2 SEASONAL ALLERGIES: ICD-10-CM

## 2025-02-10 DIAGNOSIS — F32.A DEPRESSION, UNSPECIFIED DEPRESSION TYPE: ICD-10-CM

## 2025-02-10 DIAGNOSIS — E66.01 CLASS 3 SEVERE OBESITY DUE TO EXCESS CALORIES WITH SERIOUS COMORBIDITY AND BODY MASS INDEX (BMI) OF 45.0 TO 49.9 IN ADULT: ICD-10-CM

## 2025-02-10 DIAGNOSIS — F51.01 PRIMARY INSOMNIA: ICD-10-CM

## 2025-02-10 DIAGNOSIS — Z00.00 ANNUAL PHYSICAL EXAM: Primary | ICD-10-CM

## 2025-02-10 DIAGNOSIS — F41.1 GAD (GENERALIZED ANXIETY DISORDER): Chronic | ICD-10-CM

## 2025-02-10 DIAGNOSIS — E66.813 CLASS 3 SEVERE OBESITY DUE TO EXCESS CALORIES WITH SERIOUS COMORBIDITY AND BODY MASS INDEX (BMI) OF 45.0 TO 49.9 IN ADULT: ICD-10-CM

## 2025-02-10 DIAGNOSIS — J02.9 SORE THROAT: ICD-10-CM

## 2025-02-10 LAB
EXPIRATION DATE: NORMAL
EXPIRATION DATE: NORMAL
FLUAV AG UPPER RESP QL IA.RAPID: NOT DETECTED
FLUBV AG UPPER RESP QL IA.RAPID: NOT DETECTED
INTERNAL CONTROL: NORMAL
INTERNAL CONTROL: NORMAL
Lab: NORMAL
Lab: NORMAL
S PYO AG THROAT QL: NEGATIVE
SARS-COV-2 AG UPPER RESP QL IA.RAPID: NOT DETECTED

## 2025-02-10 PROCEDURE — 87880 STREP A ASSAY W/OPTIC: CPT | Performed by: FAMILY MEDICINE

## 2025-02-10 PROCEDURE — 1159F MED LIST DOCD IN RCRD: CPT | Performed by: FAMILY MEDICINE

## 2025-02-10 PROCEDURE — 99395 PREV VISIT EST AGE 18-39: CPT | Performed by: FAMILY MEDICINE

## 2025-02-10 PROCEDURE — 2014F MENTAL STATUS ASSESS: CPT | Performed by: FAMILY MEDICINE

## 2025-02-10 PROCEDURE — 1126F AMNT PAIN NOTED NONE PRSNT: CPT | Performed by: FAMILY MEDICINE

## 2025-02-10 PROCEDURE — 1160F RVW MEDS BY RX/DR IN RCRD: CPT | Performed by: FAMILY MEDICINE

## 2025-02-10 PROCEDURE — 87428 SARSCOV & INF VIR A&B AG IA: CPT | Performed by: FAMILY MEDICINE

## 2025-02-10 RX ORDER — TRAZODONE HYDROCHLORIDE 50 MG/1
50 TABLET, FILM COATED ORAL NIGHTLY
Qty: 30 TABLET | Refills: 5 | Status: SHIPPED | OUTPATIENT
Start: 2025-02-10

## 2025-02-10 RX ORDER — CETIRIZINE HYDROCHLORIDE 10 MG/1
10 TABLET ORAL DAILY
Qty: 90 TABLET | Refills: 1 | Status: SHIPPED | OUTPATIENT
Start: 2025-02-10

## 2025-02-10 RX ORDER — VENLAFAXINE HYDROCHLORIDE 150 MG/1
150 CAPSULE, EXTENDED RELEASE ORAL
Qty: 90 CAPSULE | Refills: 1 | Status: SHIPPED | OUTPATIENT
Start: 2025-02-10

## 2025-02-10 RX ORDER — CIPROFLOXACIN AND DEXAMETHASONE 3; 1 MG/ML; MG/ML
4 SUSPENSION/ DROPS AURICULAR (OTIC) 2 TIMES DAILY
Qty: 7.5 ML | Refills: 0 | Status: SHIPPED | OUTPATIENT
Start: 2025-02-10

## 2025-02-11 NOTE — ASSESSMENT & PLAN NOTE
Patient's (Body mass index is 48.69 kg/m².) indicates that they are morbidly/severely obese (BMI > 40 or > 35 with obesity - related health condition) with health conditions that include none . Weight is worsening. BMI  is above average; BMI management plan is completed. We discussed portion control and increasing exercise.

## 2025-02-11 NOTE — ASSESSMENT & PLAN NOTE
She has not had any seizure like activity in the past 2 years. It may have been a complex migraine. We will continue to monitor her moving forward. She is not on any anti-seizure medication at this time. She is asking for her drivers license today.

## 2025-02-11 NOTE — ASSESSMENT & PLAN NOTE
Patient's depression is a recurrent episode that is moderate without psychosis. Depression is active and worsening.    Plan:   Medication  dose was adjusted;  the patient's Effexor was increased from 75 mg to 150 mg daily.    Followup in 6 months.

## 2025-03-10 ENCOUNTER — TELEPHONE (OUTPATIENT)
Dept: FAMILY MEDICINE CLINIC | Facility: CLINIC | Age: 25
End: 2025-03-10
Payer: MEDICAID

## 2025-03-10 NOTE — TELEPHONE ENCOUNTER
Tried calling patient about transportation forms, phone call was disconnected. Will try back later, need to see if she currently has DL due to previous form statement

## 2025-04-07 ENCOUNTER — HOSPITAL ENCOUNTER (EMERGENCY)
Facility: HOSPITAL | Age: 25
Discharge: HOME | End: 2025-04-07
Attending: EMERGENCY MEDICINE | Admitting: EMERGENCY MEDICINE
Payer: MEDICAID

## 2025-04-07 ENCOUNTER — APPOINTMENT (OUTPATIENT)
Dept: CT IMAGING | Facility: HOSPITAL | Age: 25
End: 2025-04-07
Payer: MEDICAID

## 2025-04-07 VITALS
HEIGHT: 62 IN | OXYGEN SATURATION: 100 % | RESPIRATION RATE: 18 BRPM | SYSTOLIC BLOOD PRESSURE: 132 MMHG | DIASTOLIC BLOOD PRESSURE: 81 MMHG | BODY MASS INDEX: 51.24 KG/M2 | TEMPERATURE: 98.4 F | WEIGHT: 278.44 LBS | HEART RATE: 93 BPM

## 2025-04-07 DIAGNOSIS — R10.84 GENERALIZED ABDOMINAL PAIN: Primary | ICD-10-CM

## 2025-04-07 DIAGNOSIS — N93.9 VAGINAL BLEEDING: ICD-10-CM

## 2025-04-07 LAB
ALBUMIN SERPL-MCNC: 3.9 G/DL (ref 3.5–5.2)
ALBUMIN/GLOB SERPL: 1 G/DL
ALP SERPL-CCNC: 87 U/L (ref 39–117)
ALT SERPL W P-5'-P-CCNC: 13 U/L (ref 1–33)
ANION GAP SERPL CALCULATED.3IONS-SCNC: 11 MMOL/L (ref 5–15)
AST SERPL-CCNC: 16 U/L (ref 1–32)
BACTERIA UR QL AUTO: ABNORMAL /HPF
BASOPHILS # BLD AUTO: 0.09 10*3/MM3 (ref 0–0.2)
BASOPHILS NFR BLD AUTO: 0.7 % (ref 0–1.5)
BILIRUB SERPL-MCNC: 0.4 MG/DL (ref 0–1.2)
BILIRUB UR QL STRIP: NEGATIVE
BUN SERPL-MCNC: 9 MG/DL (ref 6–20)
BUN/CREAT SERPL: 10.5 (ref 7–25)
CALCIUM SPEC-SCNC: 9.3 MG/DL (ref 8.6–10.5)
CHLORIDE SERPL-SCNC: 99 MMOL/L (ref 98–107)
CLARITY UR: CLEAR
CO2 SERPL-SCNC: 25 MMOL/L (ref 22–29)
COLOR UR: YELLOW
CREAT SERPL-MCNC: 0.86 MG/DL (ref 0.57–1)
DEPRECATED RDW RBC AUTO: 43.3 FL (ref 37–54)
EGFRCR SERPLBLD CKD-EPI 2021: 96.3 ML/MIN/1.73
EOSINOPHIL # BLD AUTO: 0.14 10*3/MM3 (ref 0–0.4)
EOSINOPHIL NFR BLD AUTO: 1.2 % (ref 0.3–6.2)
ERYTHROCYTE [DISTWIDTH] IN BLOOD BY AUTOMATED COUNT: 14.5 % (ref 12.3–15.4)
GLOBULIN UR ELPH-MCNC: 4 GM/DL
GLUCOSE SERPL-MCNC: 79 MG/DL (ref 65–99)
GLUCOSE UR STRIP-MCNC: NEGATIVE MG/DL
HCG INTACT+B SERPL-ACNC: <0.5 MIU/ML
HCT VFR BLD AUTO: 39.1 % (ref 34–46.6)
HGB BLD-MCNC: 12.5 G/DL (ref 12–15.9)
HGB UR QL STRIP.AUTO: ABNORMAL
HOLD SPECIMEN: NORMAL
HOLD SPECIMEN: NORMAL
HYALINE CASTS UR QL AUTO: ABNORMAL /LPF
IMM GRANULOCYTES # BLD AUTO: 0.04 10*3/MM3 (ref 0–0.05)
IMM GRANULOCYTES NFR BLD AUTO: 0.3 % (ref 0–0.5)
KETONES UR QL STRIP: ABNORMAL
LEUKOCYTE ESTERASE UR QL STRIP.AUTO: NEGATIVE
LIPASE SERPL-CCNC: 40 U/L (ref 13–60)
LYMPHOCYTES # BLD AUTO: 2.84 10*3/MM3 (ref 0.7–3.1)
LYMPHOCYTES NFR BLD AUTO: 23.6 % (ref 19.6–45.3)
MCH RBC QN AUTO: 26.2 PG (ref 26.6–33)
MCHC RBC AUTO-ENTMCNC: 32 G/DL (ref 31.5–35.7)
MCV RBC AUTO: 81.8 FL (ref 79–97)
MONOCYTES # BLD AUTO: 0.54 10*3/MM3 (ref 0.1–0.9)
MONOCYTES NFR BLD AUTO: 4.5 % (ref 5–12)
NEUTROPHILS NFR BLD AUTO: 69.7 % (ref 42.7–76)
NEUTROPHILS NFR BLD AUTO: 8.36 10*3/MM3 (ref 1.7–7)
NITRITE UR QL STRIP: NEGATIVE
NRBC BLD AUTO-RTO: 0 /100 WBC (ref 0–0.2)
PH UR STRIP.AUTO: <=5 [PH] (ref 5–8)
PLATELET # BLD AUTO: 509 10*3/MM3 (ref 140–450)
PMV BLD AUTO: 9.2 FL (ref 6–12)
POTASSIUM SERPL-SCNC: 4.1 MMOL/L (ref 3.5–5.2)
PROT SERPL-MCNC: 7.9 G/DL (ref 6–8.5)
PROT UR QL STRIP: ABNORMAL
RBC # BLD AUTO: 4.78 10*6/MM3 (ref 3.77–5.28)
RBC # UR STRIP: ABNORMAL /HPF
REF LAB TEST METHOD: ABNORMAL
SODIUM SERPL-SCNC: 135 MMOL/L (ref 136–145)
SP GR UR STRIP: >1.03 (ref 1–1.03)
SQUAMOUS #/AREA URNS HPF: ABNORMAL /HPF
UROBILINOGEN UR QL STRIP: ABNORMAL
WBC # UR STRIP: ABNORMAL /HPF
WBC NRBC COR # BLD AUTO: 12.01 10*3/MM3 (ref 3.4–10.8)
WHOLE BLOOD HOLD COAG: NORMAL
WHOLE BLOOD HOLD SPECIMEN: NORMAL

## 2025-04-07 PROCEDURE — 85025 COMPLETE CBC W/AUTO DIFF WBC: CPT | Performed by: EMERGENCY MEDICINE

## 2025-04-07 PROCEDURE — 96375 TX/PRO/DX INJ NEW DRUG ADDON: CPT

## 2025-04-07 PROCEDURE — 25010000002 ONDANSETRON PER 1 MG

## 2025-04-07 PROCEDURE — 74177 CT ABD & PELVIS W/CONTRAST: CPT

## 2025-04-07 PROCEDURE — 25010000002 KETOROLAC TROMETHAMINE PER 15 MG

## 2025-04-07 PROCEDURE — 25510000001 IOPAMIDOL PER 1 ML: Performed by: EMERGENCY MEDICINE

## 2025-04-07 PROCEDURE — 96374 THER/PROPH/DIAG INJ IV PUSH: CPT

## 2025-04-07 PROCEDURE — 99285 EMERGENCY DEPT VISIT HI MDM: CPT

## 2025-04-07 PROCEDURE — 83690 ASSAY OF LIPASE: CPT | Performed by: EMERGENCY MEDICINE

## 2025-04-07 PROCEDURE — 80053 COMPREHEN METABOLIC PANEL: CPT | Performed by: EMERGENCY MEDICINE

## 2025-04-07 PROCEDURE — 84702 CHORIONIC GONADOTROPIN TEST: CPT | Performed by: EMERGENCY MEDICINE

## 2025-04-07 PROCEDURE — 25810000003 SODIUM CHLORIDE 0.9 % SOLUTION

## 2025-04-07 PROCEDURE — 81001 URINALYSIS AUTO W/SCOPE: CPT | Performed by: EMERGENCY MEDICINE

## 2025-04-07 RX ORDER — IOPAMIDOL 755 MG/ML
100 INJECTION, SOLUTION INTRAVASCULAR
Status: COMPLETED | OUTPATIENT
Start: 2025-04-07 | End: 2025-04-07

## 2025-04-07 RX ORDER — ONDANSETRON 4 MG/1
4 TABLET, ORALLY DISINTEGRATING ORAL 4 TIMES DAILY PRN
Qty: 20 TABLET | Refills: 0 | Status: SHIPPED | OUTPATIENT
Start: 2025-04-07

## 2025-04-07 RX ORDER — KETOROLAC TROMETHAMINE 15 MG/ML
15 INJECTION, SOLUTION INTRAMUSCULAR; INTRAVENOUS ONCE
Status: COMPLETED | OUTPATIENT
Start: 2025-04-07 | End: 2025-04-07

## 2025-04-07 RX ORDER — ONDANSETRON 2 MG/ML
4 INJECTION INTRAMUSCULAR; INTRAVENOUS ONCE
Status: COMPLETED | OUTPATIENT
Start: 2025-04-07 | End: 2025-04-07

## 2025-04-07 RX ORDER — SODIUM CHLORIDE 0.9 % (FLUSH) 0.9 %
10 SYRINGE (ML) INJECTION AS NEEDED
Status: DISCONTINUED | OUTPATIENT
Start: 2025-04-07 | End: 2025-04-08 | Stop reason: HOSPADM

## 2025-04-07 RX ORDER — KETOROLAC TROMETHAMINE 10 MG/1
10 TABLET, FILM COATED ORAL EVERY 6 HOURS PRN
Qty: 15 TABLET | Refills: 0 | Status: SHIPPED | OUTPATIENT
Start: 2025-04-07

## 2025-04-07 RX ADMIN — ONDANSETRON 4 MG: 2 INJECTION INTRAMUSCULAR; INTRAVENOUS at 21:39

## 2025-04-07 RX ADMIN — SODIUM CHLORIDE 1000 ML: 9 INJECTION, SOLUTION INTRAVENOUS at 21:39

## 2025-04-07 RX ADMIN — IOPAMIDOL 100 ML: 755 INJECTION, SOLUTION INTRAVENOUS at 22:34

## 2025-04-07 RX ADMIN — KETOROLAC TROMETHAMINE 15 MG: 15 INJECTION, SOLUTION INTRAMUSCULAR; INTRAVENOUS at 21:39

## 2025-04-08 NOTE — DISCHARGE INSTRUCTIONS
Someone should contact you to schedule an appointment with gynecologist.  If you do not hear from them in 1-2 business days you may contact the number listed packet to schedule an appointment.  Return to the ED for any new or worsening concerning symptoms.  You been prescribed medications for abdominal pain and nausea to  at your pharmacy today begin taking.

## 2025-04-08 NOTE — ED PROVIDER NOTES
Time: 9:33 PM EDT  Date of encounter:  4/7/2025  Independent Historian/Clinical History and Information was obtained by:   Patient    History is limited by: N/A    Chief Complaint: Abdominal pain      History of Present Illness:  Patient is a 24 y.o. year old female who presents to the emergency department for evaluation of abdominal pain and vaginal bleeding ongoing for 5 days.  Patient reports her bleeding was heavier than normal.  That she was having to change her tampon every 30 minutes.  Patient reports bleeding has slowed down today but pain has worsened and spread throughout her abdomen.  Patient reports a history of PCOS.      Patient Care Team  Primary Care Provider: Dasha Jhaveri DO    Past Medical History:     Allergies   Allergen Reactions    Molds & Smuts Cough, Dizziness, Headache, Nausea And Vomiting, Shortness Of Breath and Swelling    Penicillins Swelling     Unsure if throat swells. Just knows it caused swelling and rash.  Happened as a baby Hives       Past Medical History:   Diagnosis Date    Allergic Not Sure    Allergies     Anemia     Anxiety     Asthma     Cholelithiasis 2020    Class 3 severe obesity due to excess calories with serious comorbidity and body mass index (BMI) of 40.0 to 44.9 in adult 07/26/2021    Depression     Diabetes mellitus     GERD (gastroesophageal reflux disease)     Low blood pressure     Migraine headache     Nonfunctioning gallbladder     Seizures 2021    Sleep apnea     SOB (shortness of breath)      Past Surgical History:   Procedure Laterality Date    CHOLECYSTECTOMY N/A 09/21/2020    Procedure: CHOLECYSTECTOMY LAPAROSCOPIC;  Surgeon: Saurabh Guerrero MD;  Location: [facility];  Service: General;  Laterality: N/A;    WISDOM TOOTH EXTRACTION Left     top L side     Family History   Problem Relation Age of Onset    Hypertension Mother     Diabetes Mother     Cancer Mother     Depression Mother     Miscarriages / Stillbirths Mother     No Known Problems Father      Cancer Maternal Aunt     Cancer Maternal Grandmother     Mental illness Maternal Grandmother     Asthma Sister     Miscarriages / Stillbirths Sister     Alcohol abuse Maternal Uncle        Home Medications:  Prior to Admission medications    Medication Sig Start Date End Date Taking? Authorizing Provider   cetirizine (zyrTEC) 10 MG tablet Take 1 tablet by mouth Daily. 2/10/25   Dasha Jhaveri DO   ciprofloxacin-dexAMETHasone (Ciprodex) 0.3-0.1 % otic suspension Administer 4 drops into the left ear 2 (Two) Times a Day. 2/10/25   Dasha Jhaveri DO   ibuprofen (ADVIL,MOTRIN) 800 MG tablet Take 1 tablet by mouth 3 times a day. 24   Provider, MD Eneida   Rimegepant Sulfate (Nurtec) 75 MG tablet dispersible tablet Take 1 tablet by mouth Every Other Day. 24   Nia Patiño APRN   traZODone (DESYREL) 50 MG tablet Take 1 tablet by mouth Every Night. 2/10/25   Dasha Jhaveri DO   venlafaxine XR (EFFEXOR-XR) 150 MG 24 hr capsule Take 1 capsule by mouth Daily With Breakfast. Indications: Major Depressive Disorder, Posttraumatic Stress Disorder 2/10/25   Dasha Jhaveri DO        Social History:   Social History     Tobacco Use    Smoking status: Former     Current packs/day: 0.00     Average packs/day: 0.5 packs/day for 6.9 years (3.5 ttl pk-yrs)     Types: Cigarettes, Electronic Cigarette     Start date:      Quit date: 2024     Years since quittin.3     Passive exposure: Past    Smokeless tobacco: Never   Vaping Use    Vaping status: Every Day    Substances: Nicotine    Devices: Disposable    Passive vaping exposure: Yes   Substance Use Topics    Alcohol use: Never     Types: 2 Standard drinks or equivalent per week     Comment: occasional    Drug use: Never         Review of Systems:  Review of Systems   Constitutional:  Negative for chills, fatigue and fever.   HENT:  Negative for ear pain, rhinorrhea and sore throat.    Eyes:  Negative for visual disturbance.   Respiratory:  Negative for cough  "and shortness of breath.    Cardiovascular:  Negative for chest pain.   Gastrointestinal:  Positive for abdominal pain and nausea. Negative for diarrhea and vomiting.   Genitourinary:  Positive for vaginal bleeding. Negative for difficulty urinating.   Musculoskeletal:  Negative for arthralgias, back pain and myalgias.   Skin:  Negative for rash.   Neurological:  Negative for light-headedness and headaches.   Hematological:  Negative for adenopathy.   Psychiatric/Behavioral: Negative.          Physical Exam:  /81 (BP Location: Right arm, Patient Position: Lying)   Pulse 93   Temp 98.4 °F (36.9 °C) (Oral)   Resp 18   Ht 157.5 cm (62\")   Wt 126 kg (278 lb 7.1 oz)   LMP 04/07/2025   SpO2 100%   BMI 50.93 kg/m²     Physical Exam  Vitals and nursing note reviewed.   Constitutional:       General: She is not in acute distress.     Appearance: Normal appearance. She is not toxic-appearing.   HENT:      Head: Normocephalic and atraumatic.      Nose: Nose normal.      Mouth/Throat:      Mouth: Mucous membranes are moist.   Eyes:      Conjunctiva/sclera: Conjunctivae normal.   Cardiovascular:      Rate and Rhythm: Normal rate.      Pulses: Normal pulses.      Heart sounds: Normal heart sounds.   Pulmonary:      Effort: Pulmonary effort is normal.      Breath sounds: Normal breath sounds.   Abdominal:      General: Bowel sounds are normal.      Palpations: Abdomen is soft.      Tenderness: There is abdominal tenderness in the right upper quadrant, right lower quadrant, epigastric area, suprapubic area and left lower quadrant.   Musculoskeletal:         General: Normal range of motion.      Cervical back: Normal range of motion.   Skin:     General: Skin is warm and dry.   Neurological:      General: No focal deficit present.      Mental Status: She is alert and oriented to person, place, and time.   Psychiatric:         Mood and Affect: Mood normal.         Behavior: Behavior normal.         Thought Content: " Thought content normal.         Judgment: Judgment normal.                    Medical Decision Making:      Comorbidities that affect care:    PCOS, Diabetes    External Notes reviewed:    None      The following orders were placed and all results were independently analyzed by me:  Orders Placed This Encounter   Procedures    CT Abdomen Pelvis With Contrast    [place] Draw    Comprehensive Metabolic Panel    Lipase    Urinalysis With Microscopic If Indicated (No Culture) - Urine, Clean Catch    hCG, Quantitative, Pregnancy    CBC Auto Differential    Urinalysis, Microscopic Only - Urine, Clean Catch    Ambulatory Referral to Obstetrics / Gynecology    Undress & Gown    CBC & Differential    Green Top (Gel)    Lavender Top    Gold Top - SST    Light Blue Top       Medications Given in the Emergency Department:  Medications   ketorolac (TORADOL) injection 15 mg (15 mg Intravenous Given 4/7/25 2139)   ondansetron (ZOFRAN) injection 4 mg (4 mg Intravenous Given 4/7/25 2139)   sodium chloride 0.9 % bolus 1,000 mL (0 mL Intravenous Stopped 4/7/25 2337)   iopamidol (ISOVUE-370) 76 % injection 100 mL (100 mL Intravenous Given 4/7/25 2234)        ED Course:         Labs:    Lab Results (last 24 hours)       ** No results found for the last 24 hours. **             Imaging:    No Radiology Exams Resulted Within Past 24 Hours      Differential Diagnosis and Discussion:    Abdominal Pain: Based on the patient's signs and symptoms, I considered abdominal aortic aneurysm, small bowel obstruction, pancreatitis, acute cholecystitis, acute appendecitis, peptic ulcer disease, gastritis, colitis, endocrine disorders, irritable bowel syndrome and other differential diagnosis an etiology of the patient's abdominal pain.    PROCEDURES:    Labs were collected in the emergency department and all labs were reviewed and interpreted by me.  CT scan was performed in the emergency department and the CT scan radiology impression was interpreted  by me.    No orders to display       Procedures    MDM  Number of Diagnoses or Management Options  Generalized abdominal pain  Vaginal bleeding  Diagnosis management comments: The patient is resting comfortably and feels better, is alert and in no distress. Repeat examination is unremarkable and benign; in particular, there's no discomfort at McBurney's point and there is no pulsatile mass. The history, exam, diagnostic testing, and current condition does not suggest acute appendicitis, bowel obstruction, acute cholecystitis, bowel perforation, major gastrointestinal bleeding, severe diverticulitis, abdominal aortic aneurysm, mesenteric ischemia, volvulus, sepsis, or other significant pathology that warrants further testing, continued ED treatment, admission, or surgical evaluation at this point. The vital signs have been stable. The patient does not have uncontrollable pain, intractable vomiting, or other significant symptoms. The patient's condition is stable and appropriate for discharge from the emergency department.         Amount and/or Complexity of Data Reviewed  Decide to obtain previous medical records or to obtain history from someone other than the patient: yes                       Patient Care Considerations:    NARCOTICS: I considered prescribing opiate pain medication as an outpatient, however patient's pain is manageable without use of narcotics in the ED.      Consultants/Shared Management Plan:    None    Social Determinants of Health:    Patient is independent, reliable, and has access to care.       Disposition and Care Coordination:    Discharged: The patient is suitable and stable for discharge with no need for consideration of admission.    I have explained the patient´s condition, diagnoses and treatment plan based on the information available to me at this time. I have answered questions and addressed any concerns. The patient has a good  understanding of the patient´s diagnosis, condition,  and treatment plan as can be expected at this point. The vital signs have been stable. The patient´s condition is stable and appropriate for discharge from the emergency department.      The patient will pursue further outpatient evaluation with the primary care physician or other designated or consulting physician as outlined in the discharge instructions. They are agreeable to this plan of care and follow-up instructions have been explained in detail. The patient has received these instructions in written format and has expressed an understanding of the discharge instructions. The patient is aware that any significant change in condition or worsening of symptoms should prompt an immediate return to this or the closest emergency department or call to 911.  I have explained discharge medications and the need for follow up with the patient/caretakers. This was also printed in the discharge instructions. Patient was discharged with the following medications and follow up:      Medication List        New Prescriptions      ketorolac 10 MG tablet  Commonly known as: TORADOL  Take 1 tablet by mouth Every 6 (Six) Hours As Needed for Moderate Pain.     ondansetron ODT 4 MG disintegrating tablet  Commonly known as: ZOFRAN-ODT  Take 1 tablet by mouth 4 (Four) Times a Day As Needed for Nausea or Vomiting.            Changed      traZODone 50 MG tablet  Commonly known as: DESYREL  Take 1 tablet by mouth Every Night.  What changed:   when to take this  reasons to take this               Where to Get Your Medications        These medications were sent to [facility]/pharmacy #[id number] - [place], [place] - [address] - [phone]  - [phone] FX  [address] [place] [address]      Hours: 24-hours Phone: [phone]   ketorolac 10 MG tablet  ondansetron ODT 4 MG disintegrating tablet      Dasha Jhaveri,   [address]  [place] [address] [phone]    Schedule an appointment as soon as possible for  a visit   As needed    [facility]  [address] [place] [address]  [phone]           Final diagnoses:   Generalized abdominal pain   Vaginal bleeding        ED Disposition       ED Disposition   Discharge    Condition   Stable    Comment   --               This medical record created using voice recognition software.             Savi Melchor, APRN  04/11/25 0947

## 2025-08-22 ENCOUNTER — OFFICE VISIT (OUTPATIENT)
Dept: FAMILY MEDICINE CLINIC | Facility: CLINIC | Age: 25
End: 2025-08-22
Payer: MEDICAID

## 2025-08-22 VITALS
WEIGHT: 279.9 LBS | SYSTOLIC BLOOD PRESSURE: 131 MMHG | TEMPERATURE: 98 F | HEIGHT: 62 IN | OXYGEN SATURATION: 96 % | DIASTOLIC BLOOD PRESSURE: 81 MMHG | HEART RATE: 113 BPM | BODY MASS INDEX: 51.51 KG/M2

## 2025-08-22 DIAGNOSIS — F33.1 MODERATE EPISODE OF RECURRENT MAJOR DEPRESSIVE DISORDER: Chronic | ICD-10-CM

## 2025-08-22 DIAGNOSIS — E66.813 CLASS 3 SEVERE OBESITY DUE TO EXCESS CALORIES WITH BODY MASS INDEX (BMI) OF 50.0 TO 59.9 IN ADULT: Primary | ICD-10-CM

## 2025-08-22 DIAGNOSIS — F41.1 GAD (GENERALIZED ANXIETY DISORDER): Chronic | ICD-10-CM

## 2025-08-22 DIAGNOSIS — Z13.1 SCREENING FOR DIABETES MELLITUS: ICD-10-CM

## 2025-08-22 LAB
EXPIRATION DATE: NORMAL
HBA1C MFR BLD: 4.7 % (ref 4.5–5.7)
Lab: NORMAL

## 2025-08-22 PROCEDURE — 3044F HG A1C LEVEL LT 7.0%: CPT | Performed by: NURSE PRACTITIONER

## 2025-08-22 PROCEDURE — 99214 OFFICE O/P EST MOD 30 MIN: CPT | Performed by: NURSE PRACTITIONER

## 2025-08-22 PROCEDURE — 1126F AMNT PAIN NOTED NONE PRSNT: CPT | Performed by: NURSE PRACTITIONER

## (undated) DEVICE — ENDOCUT SCISSOR TIP, DISPOSABLE: Brand: RENEW

## (undated) DEVICE — INSUFFLATION NEEDLE TO ESTABLISH PNEUMOPERITONEUM.: Brand: INSUFFLATION NEEDLE

## (undated) DEVICE — ENDOPATH XCEL UNIVERSAL TROCAR STABLILITY SLEEVES: Brand: ENDOPATH XCEL

## (undated) DEVICE — APPL CHLORAPREP HI/LITE 26ML ORNG

## (undated) DEVICE — [HIGH FLOW INSUFFLATOR,  DO NOT USE IF PACKAGE IS DAMAGED,  KEEP DRY,  KEEP AWAY FROM SUNLIGHT,  PROTECT FROM HEAT AND RADIOACTIVE SOURCES.]: Brand: PNEUMOSURE

## (undated) DEVICE — PAD GRND REM POLYHESIVE A/ DISP

## (undated) DEVICE — ENDOPATH XCEL BLADELESS TROCARS WITH STABILITY SLEEVES: Brand: ENDOPATH XCEL

## (undated) DEVICE — GLV SURG SENSICARE MICRO PF LF 8 STRL

## (undated) DEVICE — SUT MNCRYL 4/0 PS2 18 IN

## (undated) DEVICE — SUT VIC 0/0 UR6 27IN DYED J603H

## (undated) DEVICE — GLV SURG PREMIERPRO MIC LTX PF SZ7.5 BRN

## (undated) DEVICE — SYR LL TP 10ML STRL

## (undated) DEVICE — HOLDER: Brand: DEROYAL

## (undated) DEVICE — 2, DISPOSABLE SUCTION/IRRIGATOR WITH DISPOSABLE TIP: Brand: STRYKEFLOW

## (undated) DEVICE — DRAPE,UTILTY,TAPE,15X26, 4EA/PK: Brand: MEDLINE

## (undated) DEVICE — COR LAP CHOLE: Brand: MEDLINE INDUSTRIES, INC.